# Patient Record
Sex: FEMALE | Race: BLACK OR AFRICAN AMERICAN | NOT HISPANIC OR LATINO | Employment: FULL TIME | URBAN - METROPOLITAN AREA
[De-identification: names, ages, dates, MRNs, and addresses within clinical notes are randomized per-mention and may not be internally consistent; named-entity substitution may affect disease eponyms.]

---

## 2023-04-13 LAB — GROUP A STREP, PCR: NOT DETECTED

## 2023-04-14 ENCOUNTER — TELEPHONE (OUTPATIENT)
Dept: PRIMARY CARE | Facility: CLINIC | Age: 28
End: 2023-04-14

## 2023-04-24 ENCOUNTER — LAB (OUTPATIENT)
Dept: LAB | Facility: LAB | Age: 28
End: 2023-04-24
Payer: COMMERCIAL

## 2023-04-24 LAB
ACTIVATED PARTIAL THROMBOPLASTIN TIME IN PPP BY COAGULATION ASSAY: 146 SEC (ref 26–39)
ALANINE AMINOTRANSFERASE (SGPT) (U/L) IN SER/PLAS: 16 U/L (ref 7–45)
ALBUMIN (G/DL) IN SER/PLAS: 3.7 G/DL (ref 3.4–5)
ALKALINE PHOSPHATASE (U/L) IN SER/PLAS: 58 U/L (ref 33–110)
AMPHETAMINE (PRESENCE) IN URINE BY SCREEN METHOD: NORMAL
ANION GAP IN SER/PLAS: 10 MMOL/L (ref 10–20)
ASPARTATE AMINOTRANSFERASE (SGOT) (U/L) IN SER/PLAS: 12 U/L (ref 9–39)
BARBITURATES PRESENCE IN URINE BY SCREEN METHOD: NORMAL
BASOPHILS (10*3/UL) IN BLOOD BY AUTOMATED COUNT: 0.06 X10E9/L (ref 0–0.1)
BASOPHILS/100 LEUKOCYTES IN BLOOD BY AUTOMATED COUNT: 0.8 % (ref 0–2)
BENZODIAZEPINE (PRESENCE) IN URINE BY SCREEN METHOD: NORMAL
BILIRUBIN TOTAL (MG/DL) IN SER/PLAS: 0.3 MG/DL (ref 0–1.2)
CALCIDIOL (25 OH VITAMIN D3) (NG/ML) IN SER/PLAS: 26 NG/ML
CALCIUM (MG/DL) IN SER/PLAS: 9 MG/DL (ref 8.6–10.3)
CANNABINOIDS IN URINE BY SCREEN METHOD: NORMAL
CARBON DIOXIDE, TOTAL (MMOL/L) IN SER/PLAS: 27 MMOL/L (ref 21–32)
CHLORIDE (MMOL/L) IN SER/PLAS: 104 MMOL/L (ref 98–107)
CHOLESTEROL (MG/DL) IN SER/PLAS: 173 MG/DL (ref 0–199)
CHOLESTEROL IN HDL (MG/DL) IN SER/PLAS: 41.9 MG/DL
CHOLESTEROL/HDL RATIO: 4.1
COBALAMIN (VITAMIN B12) (PG/ML) IN SER/PLAS: 394 PG/ML (ref 211–911)
COCAINE (PRESENCE) IN URINE BY SCREEN METHOD: NORMAL
CREATININE (MG/DL) IN SER/PLAS: 0.69 MG/DL (ref 0.5–1.05)
DRUG SCREEN COMMENT URINE: NORMAL
EOSINOPHILS (10*3/UL) IN BLOOD BY AUTOMATED COUNT: 0.2 X10E9/L (ref 0–0.7)
EOSINOPHILS/100 LEUKOCYTES IN BLOOD BY AUTOMATED COUNT: 2.7 % (ref 0–6)
ERYTHROCYTE DISTRIBUTION WIDTH (RATIO) BY AUTOMATED COUNT: 15.2 % (ref 11.5–14.5)
ERYTHROCYTE MEAN CORPUSCULAR HEMOGLOBIN CONCENTRATION (G/DL) BY AUTOMATED: 31.6 G/DL (ref 32–36)
ERYTHROCYTE MEAN CORPUSCULAR VOLUME (FL) BY AUTOMATED COUNT: 78 FL (ref 80–100)
ERYTHROCYTES (10*6/UL) IN BLOOD BY AUTOMATED COUNT: 4.89 X10E12/L (ref 4–5.2)
FENTANYL URINE: NORMAL
FERRITIN (UG/LL) IN SER/PLAS: 182 UG/L (ref 8–150)
FOLATE (NG/ML) IN SER/PLAS: 12.3 NG/ML
GFR FEMALE: >90 ML/MIN/1.73M2
GLUCOSE (MG/DL) IN SER/PLAS: 91 MG/DL (ref 74–99)
HEMATOCRIT (%) IN BLOOD BY AUTOMATED COUNT: 38 % (ref 36–46)
HEMOGLOBIN (G/DL) IN BLOOD: 12 G/DL (ref 12–16)
IMMATURE GRANULOCYTES/100 LEUKOCYTES IN BLOOD BY AUTOMATED COUNT: 0.4 % (ref 0–0.9)
INR IN PPP BY COAGULATION ASSAY: 1 (ref 0.9–1.1)
IRON (UG/DL) IN SER/PLAS: 93 UG/DL (ref 35–150)
IRON BINDING CAPACITY (UG/DL) IN SER/PLAS: 332 UG/DL (ref 240–445)
IRON SATURATION (%) IN SER/PLAS: 28 % (ref 25–45)
LDL: 109 MG/DL (ref 0–99)
LEUKOCYTES (10*3/UL) IN BLOOD BY AUTOMATED COUNT: 7.5 X10E9/L (ref 4.4–11.3)
LYMPHOCYTES (10*3/UL) IN BLOOD BY AUTOMATED COUNT: 3.52 X10E9/L (ref 1.2–4.8)
LYMPHOCYTES/100 LEUKOCYTES IN BLOOD BY AUTOMATED COUNT: 46.7 % (ref 13–44)
METHADONE (PRESENCE) IN URINE BY SCREEN METHOD: NORMAL
MONOCYTES (10*3/UL) IN BLOOD BY AUTOMATED COUNT: 0.78 X10E9/L (ref 0.1–1)
MONOCYTES/100 LEUKOCYTES IN BLOOD BY AUTOMATED COUNT: 10.4 % (ref 2–10)
NEUTROPHILS (10*3/UL) IN BLOOD BY AUTOMATED COUNT: 2.94 X10E9/L (ref 1.2–7.7)
NEUTROPHILS/100 LEUKOCYTES IN BLOOD BY AUTOMATED COUNT: 39 % (ref 40–80)
NRBC (PER 100 WBCS) BY AUTOMATED COUNT: 0 /100 WBC (ref 0–0)
OPIATES (PRESENCE) IN URINE BY SCREEN METHOD: NORMAL
OXYCODONE (PRESENCE) IN URINE BY SCREEN METHOD: NORMAL
PARATHYRIN INTACT (PG/ML) IN SER/PLAS: 36.8 PG/ML (ref 18.5–88)
PHENCYCLIDINE (PRESENCE) IN URINE BY SCREEN METHOD: NORMAL
PLATELETS (10*3/UL) IN BLOOD AUTOMATED COUNT: 239 X10E9/L (ref 150–450)
POTASSIUM (MMOL/L) IN SER/PLAS: 4.1 MMOL/L (ref 3.5–5.3)
PROTEIN TOTAL: 7.1 G/DL (ref 6.4–8.2)
PROTHROMBIN TIME (PT) IN PPP BY COAGULATION ASSAY: 12 SEC (ref 9.8–13.4)
SODIUM (MMOL/L) IN SER/PLAS: 137 MMOL/L (ref 136–145)
THYROTROPIN (MIU/L) IN SER/PLAS BY DETECTION LIMIT <= 0.05 MIU/L: 1.66 MIU/L (ref 0.44–3.98)
TOBACCO SCREEN, URINE: NEGATIVE
TRIGLYCERIDE (MG/DL) IN SER/PLAS: 111 MG/DL (ref 0–149)
UREA NITROGEN (MG/DL) IN SER/PLAS: 8 MG/DL (ref 6–23)
VLDL: 22 MG/DL (ref 0–40)

## 2023-04-25 LAB
C PEPTIDE (NG/ML) IN SER/PLAS: 2.4 NG/ML (ref 0.7–3.9)
ESTIMATED AVERAGE GLUCOSE FOR HBA1C: 123 MG/DL
H. PYLORI UBIT: NEGATIVE
HEMOGLOBIN A1C/HEMOGLOBIN TOTAL IN BLOOD: 5.9 %

## 2023-04-26 LAB
COPPER: 135.5 UG/DL (ref 80–155)
ZINC,SERUM OR PLASMA: 63.3 UG/DL (ref 60–120)

## 2023-04-27 ENCOUNTER — OFFICE VISIT (OUTPATIENT)
Dept: PRIMARY CARE | Facility: CLINIC | Age: 28
End: 2023-04-27
Payer: COMMERCIAL

## 2023-04-27 VITALS
HEIGHT: 67 IN | DIASTOLIC BLOOD PRESSURE: 80 MMHG | BODY MASS INDEX: 41.94 KG/M2 | OXYGEN SATURATION: 95 % | SYSTOLIC BLOOD PRESSURE: 110 MMHG | HEART RATE: 85 BPM | WEIGHT: 267.2 LBS | TEMPERATURE: 97.4 F | RESPIRATION RATE: 15 BRPM

## 2023-04-27 DIAGNOSIS — E28.2 PCOS (POLYCYSTIC OVARIAN SYNDROME): ICD-10-CM

## 2023-04-27 DIAGNOSIS — E78.5 HYPERLIPIDEMIA, UNSPECIFIED HYPERLIPIDEMIA TYPE: ICD-10-CM

## 2023-04-27 DIAGNOSIS — H66.90 ACUTE OTITIS MEDIA, UNSPECIFIED OTITIS MEDIA TYPE: ICD-10-CM

## 2023-04-27 DIAGNOSIS — E66.01 CLASS 3 SEVERE OBESITY DUE TO EXCESS CALORIES WITH SERIOUS COMORBIDITY AND BODY MASS INDEX (BMI) OF 40.0 TO 44.9 IN ADULT (MULTI): Primary | ICD-10-CM

## 2023-04-27 DIAGNOSIS — H69.93 DISORDER OF BOTH EUSTACHIAN TUBES: ICD-10-CM

## 2023-04-27 DIAGNOSIS — D68.9 COAGULOPATHY (MULTI): ICD-10-CM

## 2023-04-27 DIAGNOSIS — R73.03 PREDIABETES: ICD-10-CM

## 2023-04-27 DIAGNOSIS — B37.31 VAGINAL YEAST INFECTION: ICD-10-CM

## 2023-04-27 PROBLEM — R41.840 ATTENTION DEFICIT: Status: ACTIVE | Noted: 2023-04-27

## 2023-04-27 PROBLEM — K21.9 HIATAL HERNIA WITH GASTROESOPHAGEAL REFLUX: Status: ACTIVE | Noted: 2023-04-27

## 2023-04-27 PROBLEM — K44.9 HIATAL HERNIA WITH GASTROESOPHAGEAL REFLUX: Status: ACTIVE | Noted: 2023-04-27

## 2023-04-27 PROBLEM — F41.9 ANXIETY AND DEPRESSION: Status: ACTIVE | Noted: 2023-04-27

## 2023-04-27 PROBLEM — Z98.84 S/P LAPAROSCOPIC SLEEVE GASTRECTOMY: Status: ACTIVE | Noted: 2022-08-29

## 2023-04-27 PROBLEM — E66.9 OBESITY: Status: ACTIVE | Noted: 2021-12-14

## 2023-04-27 PROBLEM — E66.9 OBESITY: Status: RESOLVED | Noted: 2021-12-14 | Resolved: 2023-04-27

## 2023-04-27 PROBLEM — Z98.84 HISTORY OF BARIATRIC SURGERY: Status: ACTIVE | Noted: 2023-04-27

## 2023-04-27 PROBLEM — K21.9 GASTROESOPHAGEAL REFLUX DISEASE: Status: ACTIVE | Noted: 2022-08-29

## 2023-04-27 PROBLEM — F39 MOOD DISORDER (CMS-HCC): Status: ACTIVE | Noted: 2021-12-03

## 2023-04-27 PROBLEM — F33.1 MODERATE EPISODE OF RECURRENT MAJOR DEPRESSIVE DISORDER (MULTI): Status: ACTIVE | Noted: 2023-04-27

## 2023-04-27 PROBLEM — F32.A ANXIETY AND DEPRESSION: Status: ACTIVE | Noted: 2023-04-27

## 2023-04-27 PROBLEM — G43.009 MIGRAINE WITHOUT AURA AND RESPONSIVE TO TREATMENT: Status: ACTIVE | Noted: 2021-12-03

## 2023-04-27 PROBLEM — Z97.5 IUD CONTRACEPTION: Status: ACTIVE | Noted: 2023-04-27

## 2023-04-27 PROBLEM — E04.9 GOITER: Status: ACTIVE | Noted: 2021-12-03

## 2023-04-27 PROBLEM — L70.9 ACNE: Status: ACTIVE | Noted: 2021-12-03

## 2023-04-27 PROBLEM — T78.40XA ALLERGIES: Status: ACTIVE | Noted: 2023-04-27

## 2023-04-27 PROBLEM — E55.9 VITAMIN D DEFICIENCY: Status: ACTIVE | Noted: 2023-04-27

## 2023-04-27 LAB
VITAMIN A (RETINOL): 0.42 MG/L (ref 0.3–1.2)
VITAMIN A (RETINYL PALMITATE): <0.02 MG/L (ref 0–0.1)
VITAMIN A, INTERPRETATION: NORMAL

## 2023-04-27 PROCEDURE — 1036F TOBACCO NON-USER: CPT | Performed by: FAMILY MEDICINE

## 2023-04-27 PROCEDURE — 99214 OFFICE O/P EST MOD 30 MIN: CPT | Performed by: FAMILY MEDICINE

## 2023-04-27 PROCEDURE — 3008F BODY MASS INDEX DOCD: CPT | Performed by: FAMILY MEDICINE

## 2023-04-27 RX ORDER — NORETHINDRONE ACETATE AND ETHINYL ESTRADIOL AND FERROUS FUMARATE 1.5-30(21)
1 KIT ORAL DAILY
COMMUNITY
Start: 2023-04-13 | End: 2023-04-27 | Stop reason: ALTCHOICE

## 2023-04-27 RX ORDER — AMOXICILLIN 875 MG/1
875 TABLET, FILM COATED ORAL 2 TIMES DAILY
Qty: 20 TABLET | Refills: 0 | Status: SHIPPED | OUTPATIENT
Start: 2023-04-27 | End: 2023-05-07

## 2023-04-27 RX ORDER — HYDROXYZINE HYDROCHLORIDE 25 MG/1
TABLET, FILM COATED ORAL
COMMUNITY
Start: 2021-04-15 | End: 2023-08-07

## 2023-04-27 RX ORDER — FLUTICASONE PROPIONATE 50 MCG
1 SPRAY, SUSPENSION (ML) NASAL DAILY
Qty: 16 G | Refills: 11 | Status: SHIPPED | OUTPATIENT
Start: 2023-04-27 | End: 2023-10-27 | Stop reason: ALTCHOICE

## 2023-04-27 RX ORDER — LEVONORGESTREL 52 MG/1
INTRAUTERINE DEVICE INTRAUTERINE
COMMUNITY
Start: 2020-06-17

## 2023-04-27 RX ORDER — PHENOL 1.4 %
AEROSOL, SPRAY (ML) MUCOUS MEMBRANE
COMMUNITY

## 2023-04-27 RX ORDER — GLUC/MSM/COLGN2/HYAL/ANTIARTH3 375-375-20
TABLET ORAL
COMMUNITY

## 2023-04-27 RX ORDER — FLUCONAZOLE 150 MG/1
150 TABLET ORAL ONCE
Qty: 2 TABLET | Refills: 0 | Status: SHIPPED | OUTPATIENT
Start: 2023-04-27 | End: 2023-04-27

## 2023-04-27 RX ORDER — TALC
POWDER (GRAM) TOPICAL
COMMUNITY
End: 2023-12-01 | Stop reason: WASHOUT

## 2023-04-27 RX ORDER — PSYLLIUM HUSK 0.4 G
CAPSULE ORAL
COMMUNITY

## 2023-04-27 ASSESSMENT — ANXIETY QUESTIONNAIRES
5. BEING SO RESTLESS THAT IT IS HARD TO SIT STILL: NOT AT ALL
7. FEELING AFRAID AS IF SOMETHING AWFUL MIGHT HAPPEN: NOT AT ALL
3. WORRYING TOO MUCH ABOUT DIFFERENT THINGS: NOT AT ALL
GAD7 TOTAL SCORE: 2
1. FEELING NERVOUS, ANXIOUS, OR ON EDGE: SEVERAL DAYS
2. NOT BEING ABLE TO STOP OR CONTROL WORRYING: NOT AT ALL
IF YOU CHECKED OFF ANY PROBLEMS ON THIS QUESTIONNAIRE, HOW DIFFICULT HAVE THESE PROBLEMS MADE IT FOR YOU TO DO YOUR WORK, TAKE CARE OF THINGS AT HOME, OR GET ALONG WITH OTHER PEOPLE: NOT DIFFICULT AT ALL
6. BECOMING EASILY ANNOYED OR IRRITABLE: SEVERAL DAYS
4. TROUBLE RELAXING: NOT AT ALL

## 2023-04-27 ASSESSMENT — PATIENT HEALTH QUESTIONNAIRE - PHQ9
SUM OF ALL RESPONSES TO PHQ9 QUESTIONS 1 AND 2: 0
2. FEELING DOWN, DEPRESSED OR HOPELESS: NOT AT ALL
1. LITTLE INTEREST OR PLEASURE IN DOING THINGS: NOT AT ALL
8. MOVING OR SPEAKING SO SLOWLY THAT OTHER PEOPLE COULD HAVE NOTICED. OR THE OPPOSITE, BEING SO FIGETY OR RESTLESS THAT YOU HAVE BEEN MOVING AROUND A LOT MORE THAN USUAL: NOT AT ALL
5. POOR APPETITE OR OVEREATING: SEVERAL DAYS
7. TROUBLE CONCENTRATING ON THINGS, SUCH AS READING THE NEWSPAPER OR WATCHING TELEVISION: SEVERAL DAYS
3. TROUBLE FALLING OR STAYING ASLEEP OR SLEEPING TOO MUCH: SEVERAL DAYS
10. IF YOU CHECKED OFF ANY PROBLEMS, HOW DIFFICULT HAVE THESE PROBLEMS MADE IT FOR YOU TO DO YOUR WORK, TAKE CARE OF THINGS AT HOME, OR GET ALONG WITH OTHER PEOPLE: NOT DIFFICULT AT ALL
9. THOUGHTS THAT YOU WOULD BE BETTER OFF DEAD, OR OF HURTING YOURSELF: NOT AT ALL
6. FEELING BAD ABOUT YOURSELF - OR THAT YOU ARE A FAILURE OR HAVE LET YOURSELF OR YOUR FAMILY DOWN: NOT AT ALL
4. FEELING TIRED OR HAVING LITTLE ENERGY: SEVERAL DAYS
SUM OF ALL RESPONSES TO PHQ QUESTIONS 1-9: 4

## 2023-04-27 ASSESSMENT — LIFESTYLE VARIABLES: HOW MANY STANDARD DRINKS CONTAINING ALCOHOL DO YOU HAVE ON A TYPICAL DAY: PATIENT DOES NOT DRINK

## 2023-04-27 NOTE — PATIENT INSTRUCTIONS
Assessment/Plan   Problem List Items Addressed This Visit          Endocrine/Metabolic    PCOS (polycystic ovarian syndrome)  - continue with wt loss  - declined metformin for now       Hematologic    Coagulopathy (CMS/HCC)     Elevated PTT  Referral for hem/onc          Other Visit Diagnoses       Class 3 severe obesity due to excess calories with serious comorbidity and body mass index (BMI) of 40.0 to 44.9 in adult (CMS/[facility])    -  Primary    Hyperlipidemia, unspecified hyperlipidemia type        Prediabetes        Vaginal yeast infection        Relevant Medications    fluconazole (Diflucan) 150 mg tablet    Disorder of both eustachian tubes        Relevant Medications    fluticasone (Flonase) 50 mcg/actuation nasal spray    amoxicillin (Amoxil) 875 mg tablet    fluconazole (Diflucan) 150 mg tablet    Acute otitis media, unspecified otitis media type        Relevant Medications    fluticasone (Flonase) 50 mcg/actuation nasal spray    amoxicillin (Amoxil) 875 mg tablet    fluconazole (Diflucan) 150 mg tablet            Please follow up in 6months for HTN or as needed.       ** If labs or imaging ordered at today's visit, all the non-urgent results will be discussed at your next visit    If you have been referred for a special test or to a specialist please call  [phone] to schedule an appointment.  If you have any further questions, or if develop new or worsened symptoms, please give our office a call at [phone].

## 2023-04-27 NOTE — PROGRESS NOTES
"Subjective   Patient ID: Cecelia Phillips is a 28 y.o. female who presents for Pre-op Exam (Pt is here for medical clearance for bariatric surgery.).    HPI     Getting  revision bypass  Was with gastric shelve 2021 Dec- now with a hiatal causing N/V after PO  Lost 90,then gain some back  Going to get bypass      Ear pain on the right  Was with strep 1-2 wks ago- was given abx- completed- was given z pac      Work- isL&D and main-and RN  Kid- none  not dating  - likes/ hobbies-shopping/ watching tv      Review of Systems  All systems reviewed and neg if not noted in the HPI above   Objective   /80 (Patient Position: Sitting)   Pulse 85   Temp 36.3 °C (97.4 °F)   Resp 15   Ht 1.689 m (5' 6.5\")   Wt 121 kg (267 lb 3.2 oz)   SpO2 95%   BMI 42.48 kg/m²     Physical Exam    Pleasant  Eyes: conjunctiva non-icteric and eye lids are without obvious rash or drooping. Pupils are symmetric.   Ears, Nose, Mouth, and Throat: External ears and nose appear to be without deformity or rash. No lesions or masses noted. Hearing is grossly intact.   Air fluid levels in the right ear with some erythrma  Left ear TM erythema   CV: RRR, no murmur  Carotids: no bruits  Pulm:CTA B/L  Abd: soft, NTTP, + BS  LE: no edema  Psychiatric: Alert, orientation to person, place, and time. Recent/remote memory as evidenced through face-to-face interaction and discussion appear grossly intact. Mood and affect are normal.        Assessment/Plan   Problem List Items Addressed This Visit          Endocrine/Metabolic    PCOS (polycystic ovarian syndrome)  - continue with wt loss  - declined metformin for now       Hematologic    Coagulopathy (CMS/HCC)     Elevated PTT  Referral for hem/onc          Other Visit Diagnoses       Class 3 severe obesity due to excess calories with serious comorbidity and body mass index (BMI) of 40.0 to 44.9 in adult (CMS/HCC)    -  Primary    Hyperlipidemia, unspecified hyperlipidemia type        Prediabetes        " Vaginal yeast infection        Relevant Medications    fluconazole (Diflucan) 150 mg tablet    Disorder of both eustachian tubes        Relevant Medications    fluticasone (Flonase) 50 mcg/actuation nasal spray    amoxicillin (Amoxil) 875 mg tablet    fluconazole (Diflucan) 150 mg tablet    Acute otitis media, unspecified otitis media type        Relevant Medications    fluticasone (Flonase) 50 mcg/actuation nasal spray    amoxicillin (Amoxil) 875 mg tablet    fluconazole (Diflucan) 150 mg tablet            Please follow up in 6months for HTN or as needed.       ** If labs or imaging ordered at today's visit, all the non-urgent results will be discussed at your next visit    If you have been referred for a special test or to a specialist please call  [phone] to schedule an appointment.  If you have any further questions, or if develop new or worsened symptoms, please give our office a call at [phone].

## 2023-04-29 LAB
COTININE BLOOD QUANTITATIVE: <5 NG/ML
NICOTINE BLOOD QUANTITATIVE: <5 NG/ML
VITAMIN B1, WHOLE BLOOD: 98 NMOL/L (ref 70–180)

## 2023-07-26 DIAGNOSIS — F32.A DEPRESSION, UNSPECIFIED: ICD-10-CM

## 2023-07-26 DIAGNOSIS — F41.9 ANXIETY DISORDER, UNSPECIFIED: ICD-10-CM

## 2023-08-07 ENCOUNTER — HOSPITAL ENCOUNTER (OUTPATIENT)
Dept: DATA CONVERSION | Facility: HOSPITAL | Age: 28
End: 2023-08-07
Attending: SURGERY | Admitting: SURGERY
Payer: COMMERCIAL

## 2023-08-07 DIAGNOSIS — R10.13 EPIGASTRIC PAIN: ICD-10-CM

## 2023-08-07 DIAGNOSIS — R12 HEARTBURN: ICD-10-CM

## 2023-08-07 DIAGNOSIS — Z98.84 BARIATRIC SURGERY STATUS: ICD-10-CM

## 2023-08-07 DIAGNOSIS — K21.9 GASTRO-ESOPHAGEAL REFLUX DISEASE WITHOUT ESOPHAGITIS: ICD-10-CM

## 2023-08-07 DIAGNOSIS — K95.89 OTHER COMPLICATIONS OF OTHER BARIATRIC PROCEDURE: ICD-10-CM

## 2023-08-07 DIAGNOSIS — R11.2 NAUSEA WITH VOMITING, UNSPECIFIED: ICD-10-CM

## 2023-08-07 RX ORDER — HYDROXYZINE HYDROCHLORIDE 25 MG/1
25 TABLET, FILM COATED ORAL NIGHTLY
Qty: 90 TABLET | Refills: 1 | Status: SHIPPED | OUTPATIENT
Start: 2023-08-07 | End: 2023-10-27 | Stop reason: SDUPTHER

## 2023-09-05 LAB
ALANINE AMINOTRANSFERASE (SGPT) (U/L) IN SER/PLAS: 10 U/L (ref 7–45)
ALBUMIN (G/DL) IN SER/PLAS: 3.7 G/DL (ref 3.4–5)
ALKALINE PHOSPHATASE (U/L) IN SER/PLAS: 70 U/L (ref 33–110)
ANION GAP IN SER/PLAS: 9 MMOL/L (ref 10–20)
ASPARTATE AMINOTRANSFERASE (SGOT) (U/L) IN SER/PLAS: 12 U/L (ref 9–39)
BASOPHILS (10*3/UL) IN BLOOD BY AUTOMATED COUNT: 0.04 X10E9/L (ref 0–0.1)
BASOPHILS/100 LEUKOCYTES IN BLOOD BY AUTOMATED COUNT: 0.8 % (ref 0–2)
BILIRUBIN TOTAL (MG/DL) IN SER/PLAS: 0.4 MG/DL (ref 0–1.2)
CALCIDIOL (25 OH VITAMIN D3) (NG/ML) IN SER/PLAS: 29 NG/ML
CALCIUM (MG/DL) IN SER/PLAS: 8.7 MG/DL (ref 8.6–10.3)
CARBON DIOXIDE, TOTAL (MMOL/L) IN SER/PLAS: 30 MMOL/L (ref 21–32)
CHLORIDE (MMOL/L) IN SER/PLAS: 103 MMOL/L (ref 98–107)
COBALAMIN (VITAMIN B12) (PG/ML) IN SER/PLAS: 337 PG/ML (ref 211–911)
CREATININE (MG/DL) IN SER/PLAS: 0.64 MG/DL (ref 0.5–1.05)
EOSINOPHILS (10*3/UL) IN BLOOD BY AUTOMATED COUNT: 0.19 X10E9/L (ref 0–0.7)
EOSINOPHILS/100 LEUKOCYTES IN BLOOD BY AUTOMATED COUNT: 3.6 % (ref 0–6)
ERYTHROCYTE DISTRIBUTION WIDTH (RATIO) BY AUTOMATED COUNT: 15.6 % (ref 11.5–14.5)
ERYTHROCYTE MEAN CORPUSCULAR HEMOGLOBIN CONCENTRATION (G/DL) BY AUTOMATED: 31.4 G/DL (ref 32–36)
ERYTHROCYTE MEAN CORPUSCULAR VOLUME (FL) BY AUTOMATED COUNT: 77 FL (ref 80–100)
ERYTHROCYTES (10*6/UL) IN BLOOD BY AUTOMATED COUNT: 4.95 X10E12/L (ref 4–5.2)
ESTIMATED AVERAGE GLUCOSE FOR HBA1C: 111 MG/DL
FERRITIN (UG/LL) IN SER/PLAS: 171 UG/L (ref 8–150)
FOLATE (NG/ML) IN SER/PLAS: 12.8 NG/ML
GFR FEMALE: >90 ML/MIN/1.73M2
GLUCOSE (MG/DL) IN SER/PLAS: 85 MG/DL (ref 74–99)
HEMATOCRIT (%) IN BLOOD BY AUTOMATED COUNT: 37.9 % (ref 36–46)
HEMOGLOBIN (G/DL) IN BLOOD: 11.9 G/DL (ref 12–16)
HEMOGLOBIN A1C/HEMOGLOBIN TOTAL IN BLOOD: 5.5 %
IMMATURE GRANULOCYTES/100 LEUKOCYTES IN BLOOD BY AUTOMATED COUNT: 0 % (ref 0–0.9)
IRON (UG/DL) IN SER/PLAS: 78 UG/DL (ref 35–150)
IRON BINDING CAPACITY (UG/DL) IN SER/PLAS: 325 UG/DL (ref 240–445)
IRON SATURATION (%) IN SER/PLAS: 24 % (ref 25–45)
LEUKOCYTES (10*3/UL) IN BLOOD BY AUTOMATED COUNT: 5.3 X10E9/L (ref 4.4–11.3)
LYMPHOCYTES (10*3/UL) IN BLOOD BY AUTOMATED COUNT: 2.74 X10E9/L (ref 1.2–4.8)
LYMPHOCYTES/100 LEUKOCYTES IN BLOOD BY AUTOMATED COUNT: 52.1 % (ref 13–44)
MONOCYTES (10*3/UL) IN BLOOD BY AUTOMATED COUNT: 0.55 X10E9/L (ref 0.1–1)
MONOCYTES/100 LEUKOCYTES IN BLOOD BY AUTOMATED COUNT: 10.5 % (ref 2–10)
NEUTROPHILS (10*3/UL) IN BLOOD BY AUTOMATED COUNT: 1.74 X10E9/L (ref 1.2–7.7)
NEUTROPHILS/100 LEUKOCYTES IN BLOOD BY AUTOMATED COUNT: 33 % (ref 40–80)
PARATHYRIN INTACT (PG/ML) IN SER/PLAS: 24.3 PG/ML (ref 18.5–88)
PLATELETS (10*3/UL) IN BLOOD AUTOMATED COUNT: 159 X10E9/L (ref 150–450)
POTASSIUM (MMOL/L) IN SER/PLAS: 3.8 MMOL/L (ref 3.5–5.3)
PROTEIN TOTAL: 7.2 G/DL (ref 6.4–8.2)
SODIUM (MMOL/L) IN SER/PLAS: 138 MMOL/L (ref 136–145)
UREA NITROGEN (MG/DL) IN SER/PLAS: 7 MG/DL (ref 6–23)

## 2023-09-11 LAB — VITAMIN B1, WHOLE BLOOD: 94 NMOL/L (ref 70–180)

## 2023-09-30 NOTE — H&P
History of Present Illness:   Pregnant/Lactating:  ·  Are You Pregnant no   ·  Are You Currently Breastfeeding no     History Present Illness:  Reason for surgery: EGD for persistent reflux/n/v/poor  PO intake   HPI:    28F w/ h/o sleeve (2021) converted to bypass on 6/7/2023 here with persistent reflux and poor PO intake.     Past Medical History:        Surg History:   Gastroesophageal reflux disease: Onset Date: 13-Jun-2023    Allergies:        Allergies:  ·  No Known Allergies :     Home Medication Review:   Home Medications Reviewed: yes     Impression/Procedure:   ·  Impression and Planned Procedure: Young female sleeve converted to bypass here about 2 months post surgery complaining of persistent reflux and poor PO intake. Diagnostic EGD       ERAS (Enhanced Recovery After Surgery):  ·  ERAS Patient: no                   Physical Exam by System:    Constitutional: Well developed, awake/alert/oriented  x3, no distress, alert and cooperative   Respiratory/Thorax: Patent airways, CTAB, normal  breath sounds with good chest expansion, thorax symmetric   Cardiovascular: Regular, rate and rhythm, no murmurs,  2+ equal pulses of the extremities, normal S 1and S 2   Psychological: Appropriate mood and behavior   Skin: Warm and dry, no lesions, no rashes     Consent:   COVID-19 Consent:  ·  COVID-19 Risk Consent Surgeon has reviewed key risks related to the risk of fer COVID-19 and if they contract COVID-19 what the risks are.       Electronic Signatures:  Stella Gan)  (Signed 07-Aug-2023 09:58)   Authored: History of Present Illness, Past Medical History,  Allergies, Home Medication Review, Impression/Procedure, ERAS, Physical Exam, Consent, Note Completion      Last Updated: 07-Aug-2023 09:58 by Stella Gan)

## 2023-10-27 ENCOUNTER — OFFICE VISIT (OUTPATIENT)
Dept: PRIMARY CARE | Facility: CLINIC | Age: 28
End: 2023-10-27
Payer: COMMERCIAL

## 2023-10-27 VITALS
HEART RATE: 80 BPM | TEMPERATURE: 97.7 F | OXYGEN SATURATION: 97 % | DIASTOLIC BLOOD PRESSURE: 74 MMHG | BODY MASS INDEX: 36.37 KG/M2 | RESPIRATION RATE: 14 BRPM | SYSTOLIC BLOOD PRESSURE: 118 MMHG | WEIGHT: 226.3 LBS | HEIGHT: 66 IN

## 2023-10-27 DIAGNOSIS — F32.A DEPRESSION, UNSPECIFIED: ICD-10-CM

## 2023-10-27 DIAGNOSIS — F41.9 ANXIETY DISORDER, UNSPECIFIED: ICD-10-CM

## 2023-10-27 DIAGNOSIS — H93.8X3 IRRITATION OF EAR, BILATERAL: ICD-10-CM

## 2023-10-27 DIAGNOSIS — Z00.00 WELLNESS EXAMINATION: Primary | ICD-10-CM

## 2023-10-27 PROBLEM — R11.2 POST-OPERATIVE NAUSEA AND VOMITING: Status: ACTIVE | Noted: 2023-10-27

## 2023-10-27 PROBLEM — D50.9 IRON DEFICIENCY ANEMIA: Status: ACTIVE | Noted: 2023-10-27

## 2023-10-27 PROBLEM — G89.18 POST-OP PAIN: Status: ACTIVE | Noted: 2023-10-27

## 2023-10-27 PROBLEM — E86.0 DEHYDRATION: Status: ACTIVE | Noted: 2023-10-27

## 2023-10-27 PROBLEM — B37.31 CANDIDIASIS OF VAGINA: Status: ACTIVE | Noted: 2023-10-27

## 2023-10-27 PROBLEM — K91.2 POSTOPERATIVE MALABSORPTION (HHS-HCC): Status: ACTIVE | Noted: 2023-10-27

## 2023-10-27 PROBLEM — R11.0 NAUSEA IN ADULT: Status: ACTIVE | Noted: 2023-10-27

## 2023-10-27 PROBLEM — D68.9 COAGULOPATHY (MULTI): Status: RESOLVED | Noted: 2023-04-27 | Resolved: 2023-10-27

## 2023-10-27 PROBLEM — K59.00 CONSTIPATION, ACUTE: Status: ACTIVE | Noted: 2023-10-27

## 2023-10-27 PROBLEM — F54 PSYCHOLOGICAL FACTORS AFFECTING MEDICAL CONDITION: Status: ACTIVE | Noted: 2023-10-27

## 2023-10-27 PROBLEM — Z98.890 POST-OPERATIVE NAUSEA AND VOMITING: Status: ACTIVE | Noted: 2023-10-27

## 2023-10-27 PROBLEM — Z98.84 S/P GASTRIC BYPASS: Status: ACTIVE | Noted: 2023-10-27

## 2023-10-27 PROBLEM — F39 MOOD DISORDER (CMS-HCC): Status: RESOLVED | Noted: 2021-12-03 | Resolved: 2023-10-27

## 2023-10-27 PROCEDURE — 99395 PREV VISIT EST AGE 18-39: CPT | Performed by: FAMILY MEDICINE

## 2023-10-27 PROCEDURE — 1036F TOBACCO NON-USER: CPT | Performed by: FAMILY MEDICINE

## 2023-10-27 PROCEDURE — 3008F BODY MASS INDEX DOCD: CPT | Performed by: FAMILY MEDICINE

## 2023-10-27 PROCEDURE — 99212 OFFICE O/P EST SF 10 MIN: CPT | Performed by: FAMILY MEDICINE

## 2023-10-27 RX ORDER — THIAMINE HCL 100 MG
50 TABLET ORAL DAILY
COMMUNITY
Start: 2023-07-03

## 2023-10-27 RX ORDER — OMEPRAZOLE 40 MG/1
CAPSULE, DELAYED RELEASE ORAL
COMMUNITY
Start: 2023-05-26 | End: 2024-05-16 | Stop reason: SDUPTHER

## 2023-10-27 RX ORDER — ONDANSETRON 4 MG/1
TABLET, ORALLY DISINTEGRATING ORAL
COMMUNITY
Start: 2023-05-26 | End: 2023-11-06 | Stop reason: SDUPTHER

## 2023-10-27 RX ORDER — FLUOCINOLONE ACETONIDE 0.11 MG/ML
5 OIL AURICULAR (OTIC) 2 TIMES DAILY
Qty: 20 ML | Refills: 0 | Status: SHIPPED | OUTPATIENT
Start: 2023-10-27 | End: 2023-12-01 | Stop reason: WASHOUT

## 2023-10-27 RX ORDER — HYDROXYZINE HYDROCHLORIDE 25 MG/1
25 TABLET, FILM COATED ORAL NIGHTLY
Qty: 90 TABLET | Refills: 1 | Status: SHIPPED | OUTPATIENT
Start: 2023-10-27 | End: 2023-12-18 | Stop reason: SDUPTHER

## 2023-10-27 ASSESSMENT — PROMIS GLOBAL HEALTH SCALE
RATE_AVERAGE_PAIN: 0
EMOTIONAL_PROBLEMS: RARELY
CARRYOUT_SOCIAL_ACTIVITIES: EXCELLENT
RATE_GENERAL_HEALTH: VERY GOOD
CARRYOUT_PHYSICAL_ACTIVITIES: COMPLETELY
RATE_PHYSICAL_HEALTH: VERY GOOD
RATE_QUALITY_OF_LIFE: VERY GOOD
RATE_MENTAL_HEALTH: GOOD
RATE_AVERAGE_FATIGUE: MILD
RATE_SOCIAL_SATISFACTION: VERY GOOD

## 2023-10-27 ASSESSMENT — PATIENT HEALTH QUESTIONNAIRE - PHQ9
SUM OF ALL RESPONSES TO PHQ9 QUESTIONS 1 AND 2: 0
1. LITTLE INTEREST OR PLEASURE IN DOING THINGS: NOT AT ALL
2. FEELING DOWN, DEPRESSED OR HOPELESS: NOT AT ALL

## 2023-10-27 NOTE — PATIENT INSTRUCTIONS
*CONGRATULATIONS ON YOUR   60 LB WEIGHT LOSS!! *  - KEEP UP THE GOOD WORK  - Continue to work on healthy diet and exercise     We encourage all patients to engage in exercise 150 minutes per week   Adults 18 and older, activity during each week - if you are able:    Engage in at least 150 minutes of moderate or vigorous exercise per week   If you are able- Engage in muscle strengthening activity on 2 or more days per week        Please follow up in 1 yr for wellness  or as needed.       ** If labs or imaging ordered at today's visit, all the non-urgent results will be discussed at your next visit    If you have been referred for a special test or to a specialist please call  [phone] to schedule an appointment.  If you have any further questions, or if develop new or worsened symptoms, please give our office a call at [phone].

## 2023-10-27 NOTE — ADDENDUM NOTE
Addended by: MINGO BRENNER on: 10/27/2023 09:01 AM     Modules accepted: Orders, Level of Service

## 2023-10-27 NOTE — PROGRESS NOTES
"Subjective   Patient ID: Cecelia Phillips is a 28 y.o. female who presents for Annual Exam.    HPI   Here for a physical  Does not want a chaperone.     Other concerns/issues/followup:  1 - was with revision bypass- getting some V/N  Was with gastric shelve 2021 Dec- now with a hiatal causing N/V after PO  Lost 90,then gain some back  Lost 60lb since revision     2. Has been with ear irritation and itching    [facility] was reviewed & updated.     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 Pupils are symmetric.   Ears, Nose, Mouth, and Throat: External ears and nose appear to be without deformity or rash. No lesions or masses noted. Hearing is grossly intact.   CV: RRR, no murmur  Carotids: no bruits  Ear with erythema, irritation, no swelling or TM erythema, mild clear fluid in the right TM  Thyroid nml  Pulm:CTA B/L  Abd: soft, NTTP, + BS  LE: no edema  Psychiatric: Alert, orientation to person, place, and time. Recent/remote memory as evidenced through face-to-face interaction and discussion appear grossly intact. Mood and affect are normal.      Assessment/Plan   Problem List Items Addressed This Visit    None  Visit Diagnoses         Codes    Wellness examination    -  Primary Z00.00    Relevant Orders    Lipid Panel    Anxiety disorder, unspecified     F41.9    Relevant Medications    hydrOXYzine HCL (Atarax) 25 mg tablet    Depression, unspecified     F32.A    Relevant Medications    hydrOXYzine HCL (Atarax) 25 mg tablet    Irritation " of ear, bilateral     H93.8X3    Relevant Medications    fluocinolone (DermOtic) 0.01 % ear drops          Please follow up in 1 yr for wellness  or as needed.

## 2023-11-04 RX ORDER — ERGOCALCIFEROL 1.25 MG/1
CAPSULE ORAL
COMMUNITY

## 2023-11-04 RX ORDER — PROMETHAZINE HYDROCHLORIDE 12.5 MG/1
12.5 TABLET ORAL EVERY 6 HOURS PRN
COMMUNITY

## 2023-11-04 RX ORDER — ESOMEPRAZOLE MAGNESIUM 40 MG/1
CAPSULE, DELAYED RELEASE ORAL
COMMUNITY
End: 2023-11-06 | Stop reason: SDUPTHER

## 2023-11-06 ENCOUNTER — TELEMEDICINE (OUTPATIENT)
Dept: SURGERY | Facility: CLINIC | Age: 28
End: 2023-11-06
Payer: COMMERCIAL

## 2023-11-06 DIAGNOSIS — Z98.84 S/P GASTRIC BYPASS: Primary | ICD-10-CM

## 2023-11-06 DIAGNOSIS — R13.10 DYSPHAGIA, UNSPECIFIED TYPE: ICD-10-CM

## 2023-11-06 DIAGNOSIS — R11.0 NAUSEA IN ADULT: ICD-10-CM

## 2023-11-06 DIAGNOSIS — K91.2 POSTOPERATIVE MALABSORPTION (HHS-HCC): ICD-10-CM

## 2023-11-06 PROCEDURE — 99215 OFFICE O/P EST HI 40 MIN: CPT | Mod: 95 | Performed by: NURSE PRACTITIONER

## 2023-11-06 PROCEDURE — 99215 OFFICE O/P EST HI 40 MIN: CPT | Performed by: NURSE PRACTITIONER

## 2023-11-06 RX ORDER — ESOMEPRAZOLE MAGNESIUM 40 MG/1
40 GRANULE, DELAYED RELEASE ORAL
Qty: 60 PACKET | Refills: 3 | Status: SHIPPED | OUTPATIENT
Start: 2023-11-06 | End: 2024-05-09 | Stop reason: HOSPADM

## 2023-11-06 RX ORDER — ONDANSETRON 4 MG/1
4-8 TABLET, ORALLY DISINTEGRATING ORAL EVERY 8 HOURS PRN
Qty: 30 TABLET | Refills: 3 | Status: SHIPPED | OUTPATIENT
Start: 2023-11-06

## 2023-11-06 RX ORDER — SUCRALFATE 1 G/1
1 TABLET ORAL
Qty: 120 TABLET | Refills: 3 | Status: SHIPPED | OUTPATIENT
Start: 2023-11-06 | End: 2023-12-01 | Stop reason: WASHOUT

## 2023-11-06 NOTE — PROGRESS NOTES
BARIATRIC SURGERY CLINIC  FOLLOW UP NOTE      Name: Cecelia Phillips  MRN: [id number]    Index Surgery  Date of Surgery: 6/7/23    Surgeon: Malik    Surgical Procedure: Revision: LSG to Bypass 91318    HPI:   Presenting for follow up visit.  Had revision LSG to Bypass.  Had EGD with dilation GJ anastamosis with Dr. Gan in August 2023.  At 3 m POV in September c/o nausea from omeprazole so switched to Nexium.  Recently messaged RN coordinator stating having increased issues with early satiety & emesis after meals, feeling of food getting stuck.    Still taking BID PPI, initially helpful but in last 2 weeks has been struggling with increased post regular diet meal emesis and early satiety.    Diet Had to revert back to soft foods diet due to poor diet tolerance, emesis after solid/regular diet.  Meeting protein and fluid requirements with assistance of shakes.  Able to do smoothies and pureed.  Sometimes pureed/soft foods trigger nausea & vomiting.   Feels similar to prior to her dilation.     Concerns related to:  Nausea/Vomiting, Reflux: + see above  Abdominal Pain: Had 1 episode of RUQ after eating yesterday, resolved spontaneously.    Diarrhea/Constipation Has struggled with constipation historically    DAILY SUPPLEMENTS:  Calcium: Calcium Citrate w/ vitamin D (1200 - 1500mg)  Multivitamin & Minerals: 2 per day  Vitamin B12: 500 mcg/day   Vitamin D3: 3000 units  Iron/Other: iron daily  PPI:Nexium once daily      Current Outpatient Medications   Medication Sig Dispense Refill    calcium carbonate-vitamin D3 1,000 mg-20 mcg (800 unit) tablet Take by mouth.      ergocalciferol (Vitamin D-2) 1.25 MG (50587 UT) capsule Take 1 capsule twice weekly x 8 weeks then start Vitamin D3 2000 units daily over the counter      esomeprazole (NexIUM Packet) 40 mg packet Take 40 mg by mouth once daily in the morning. Take before meals. Mix packet in 10mL water and take twice daily 60 packet 3    ferrous gluconate 236 mg (27 mg iron)  tablet Take by mouth.      fluocinolone (DermOtic) 0.01 % ear drops Administer 5 drops into each ear 2 times a day. 20 mL 0    hydrOXYzine HCL (Atarax) 25 mg tablet Take 1 tablet (25 mg) by mouth once daily at bedtime. 90 tablet 1    levonorgestrel (Mirena) 21 mcg/24 hours (8 yrs) 52 mg IUD by intrauterine route.      melatonin 3 mg tablet Take by mouth.      multivitamin-min-iron-FA-vit K (Adults Multivitamin) 18 mg iron-400 mcg-25 mcg tablet Take by mouth.      omeprazole (PriLOSEC) 40 mg DR capsule Take by mouth once daily.      ondansetron ODT (Zofran-ODT) 4 mg disintegrating tablet Take 1-2 tablets (4-8 mg) by mouth every 8 hours if needed for nausea or vomiting. 30 tablet 3    promethazine (Phenergan) 12.5 mg tablet Take 1 tablet (12.5 mg) by mouth every 6 hours if needed for nausea.      sucralfate (Carafate) 1 gram tablet Take 1 tablet (1 g) by mouth 4 times a day before meals. Crush tablet and mix in 10mL water 120 tablet 3    Vitamin B-1 100 mg tablet Take 0.5 tablets (50 mg) by mouth once daily.       No current facility-administered medications for this visit.       Comorbidities:  Patient Active Problem List   Diagnosis    Acne    Allergies    Anxiety and depression    Attention deficit    Goiter    Gastroesophageal reflux disease    Hiatal hernia with gastroesophageal reflux    History of bariatric surgery    IUD contraception    Migraine without aura and responsive to treatment    Moderate episode of recurrent major depressive disorder (CMS/HCC)    PCOS (polycystic ovarian syndrome)    Vitamin D deficiency    Candidiasis of vagina    Constipation, acute    Dehydration    Iron deficiency anemia    Nausea in adult    Post-op pain    Post-operative nausea and vomiting    Postoperative malabsorption    Psychological factors affecting medical condition    S/P gastric bypass    Dysphagia         REVIEW OF SYSTEMS:  CONSTITUTIONAL: Patient denies fevers, chills, sweats and weight changes.  CARDIOVASCULAR:  Patient denies chest pains, palpitations, orthopnea and paroxysmal nocturnal dyspnea.  RESPIRATORY: No dyspnea on exertion, no wheezing or cough.  GI: No nausea, vomiting, diarrhea, constipation, abdominal pain, hematochezia or melena.  MUSCULOSKELETAL: No myalgias or arthralgias.  NEUROLOGIC: No chronic headaches, no seizures. Patient denies numbness, tingling or weakness.  PSYCHIATRIC: Patient denies problems with mood disturbance. No problems with anxiety.  ENDOCRINE: No excessive urination or excessive thirst.  DERMATOLOGIC: Patient denies any rashes or skin changes.    PHYSICAL EXAM:  There were no vitals taken for this visit.  Alert, well appearing, no acute distress, nourished, hydrated.  Anicteric sclera, no ptosis  Facial symmetry  Neck supple  Unlabored respirations.  Easily conversant without increased respiratory effort  Oriented to person, place, time.  Judgement intact.  Appropriate mood, affect.       ASSESSMENT & PLAN:  28 y.o. female presenting for follow up visit s/p bariatric surgery.    Weight Loss: Initial 250 ->   Current:   Wt Readings from Last 1 Encounters:   10/27/23 103 kg (226 lb 4.8 oz)       Problem List Items Addressed This Visit       Nausea in adult    Relevant Medications    esomeprazole (NexIUM Packet) 40 mg packet    sucralfate (Carafate) 1 gram tablet    ondansetron ODT (Zofran-ODT) 4 mg disintegrating tablet    Other Relevant Orders    US gallbladder    Postoperative malabsorption     Recheck iron & vitamin D         Relevant Medications    esomeprazole (NexIUM Packet) 40 mg packet    sucralfate (Carafate) 1 gram tablet    Other Relevant Orders    FL upper GI w KUB    FL GI esophagram    Iron and TIBC    Vitamin D 25-Hydroxy,Total (for eval of Vitamin D levels)    CBC    Ferritin    US gallbladder    S/P gastric bypass - Primary     S/P GBP (revison sleeve to gBP) 6/7/23 - complicated by postoperative GJ stricture requiring dilation in August 2023.  Initially did well after  dilation advancing diet but in past 2 weeks has been experiencing increased issues with diet intolerance, requiring reliance on shakes/smoothies/protein supplements to meet nutrition needs.  Able to tolerate PO fluids.   Taking appropriate supplements  Has continued Nexium once daily, had poor tolerance of omeprazole taste causing nausea.    Is c/o RUQ pain after eating in last 24-48 hours- may be related to ulcer but has gallbladder.     Plan:  -Continue soft/pureed foods and use protein shakes to meet nutritional needs.  - Continue to track fluids to ensure adequate hydration.  - Reviewed red flag symptoms requiring urgent/emergent evaluation for hydration or nutrition support.  - Check US to rule out gallbladder disease  - Update upper GI/esophagram, likely will need EGD evaluation for possible stricture dilation  - Increase Nexium to BID, open packet  - Add carafate AC/HS  - Will coordinate EGD with Dr. Gan.           Relevant Medications    esomeprazole (NexIUM Packet) 40 mg packet    sucralfate (Carafate) 1 gram tablet    ondansetron ODT (Zofran-ODT) 4 mg disintegrating tablet    Other Relevant Orders    FL upper GI w KUB    FL GI esophagram    Iron and TIBC    Vitamin D 25-Hydroxy,Total (for eval of Vitamin D levels)    CBC    Ferritin    US gallbladder    Dysphagia     Required GJ dilation 8/20023 s/p revision LSG to RGYBP 6/7/2023  Worsening dysphagia with regular diet.  Tolerating PO soft foods and protein shakes    Stat esophagram & upper GI, likely may need repeat EGD         Relevant Medications    esomeprazole (NexIUM Packet) 40 mg packet    sucralfate (Carafate) 1 gram tablet    Other Relevant Orders    FL upper GI w KUB    FL GI esophagram    US gallbladder

## 2023-11-06 NOTE — ASSESSMENT & PLAN NOTE
Required GJ dilation 8/20023 s/p revision LSG to RGYBP 6/7/2023  Worsening dysphagia with regular diet.  Tolerating PO soft foods and protein shakes    Stat esophagram & upper GI, likely may need repeat EGD

## 2023-11-06 NOTE — ASSESSMENT & PLAN NOTE
S/P GBP (revison sleeve to gBP) 6/7/23 - complicated by postoperative GJ stricture requiring dilation in August 2023.  Initially did well after dilation advancing diet but in past 2 weeks has been experiencing increased issues with diet intolerance, requiring reliance on shakes/smoothies/protein supplements to meet nutrition needs.  Able to tolerate PO fluids.   Taking appropriate supplements  Has continued Nexium once daily, had poor tolerance of omeprazole taste causing nausea.    Is c/o RUQ pain after eating in last 24-48 hours- may be related to ulcer but has gallbladder.     Plan:  -Continue soft/pureed foods and use protein shakes to meet nutritional needs.  - Continue to track fluids to ensure adequate hydration.  - Reviewed red flag symptoms requiring urgent/emergent evaluation for hydration or nutrition support.  - Check US to rule out gallbladder disease  - Update upper GI/esophagram, likely will need EGD evaluation for possible stricture dilation  - Increase Nexium to BID, open packet  - Add carafate AC/HS  - Will coordinate EGD with Dr. Gan.

## 2023-11-09 ENCOUNTER — APPOINTMENT (OUTPATIENT)
Dept: SURGERY | Facility: CLINIC | Age: 28
End: 2023-11-09
Payer: COMMERCIAL

## 2023-11-20 ENCOUNTER — TELEPHONE (OUTPATIENT)
Dept: SURGERY | Facility: CLINIC | Age: 28
End: 2023-11-20
Payer: COMMERCIAL

## 2023-11-20 NOTE — TELEPHONE ENCOUNTER
From: Jeannette Monzon <[email or web address]   Sent: 2023 3:38 PM  To: Adeola Pérez <[email or web address]>  Subject:     Hello,   this is jeannette monzon ( 89) i was emailing you because i had surgery in 2023 and at the time i had signed up for school online at  and thought i would be able to do it but because of the complications and the multiple extensions of the leave from work and the infusions and endoscopies i was unable to actually do my coursework and couldn't attend but i want to attend again and to have a tuition forgiveness i need proof of having surgery and complications to submit     jeannette monzon                  Here you go!!     Adeola Pérez, BSN, RN   Assistant Nurse Manager   Bariatric and Metabolic Surgery   [address] [place] [address]   P: [phone] F: [phone]  Available via Epic Secure Chat

## 2023-11-28 ENCOUNTER — ANESTHESIA (OUTPATIENT)
Dept: GASTROENTEROLOGY | Facility: HOSPITAL | Age: 28
End: 2023-11-28
Payer: COMMERCIAL

## 2023-11-28 ENCOUNTER — ANESTHESIA EVENT (OUTPATIENT)
Dept: GASTROENTEROLOGY | Facility: HOSPITAL | Age: 28
End: 2023-11-28
Payer: COMMERCIAL

## 2023-11-28 ENCOUNTER — HOSPITAL ENCOUNTER (OUTPATIENT)
Dept: GASTROENTEROLOGY | Facility: HOSPITAL | Age: 28
Setting detail: OUTPATIENT SURGERY
Discharge: HOME | End: 2023-11-28
Payer: COMMERCIAL

## 2023-11-28 VITALS
TEMPERATURE: 98.1 F | DIASTOLIC BLOOD PRESSURE: 76 MMHG | BODY MASS INDEX: 35.36 KG/M2 | SYSTOLIC BLOOD PRESSURE: 109 MMHG | RESPIRATION RATE: 28 BRPM | OXYGEN SATURATION: 99 % | HEIGHT: 66 IN | WEIGHT: 220 LBS | HEART RATE: 84 BPM

## 2023-11-28 DIAGNOSIS — Z98.84 S/P GASTRIC BYPASS: Primary | ICD-10-CM

## 2023-11-28 DIAGNOSIS — R13.10 DYSPHAGIA, UNSPECIFIED TYPE: ICD-10-CM

## 2023-11-28 PROCEDURE — A43235 PR ESOPHAGOGASTRODUODENOSCOPY TRANSORAL DIAGNOSTIC

## 2023-11-28 PROCEDURE — 3700000001 HC GENERAL ANESTHESIA TIME - INITIAL BASE CHARGE: Performed by: SURGERY

## 2023-11-28 PROCEDURE — A43235 PR ESOPHAGOGASTRODUODENOSCOPY TRANSORAL DIAGNOSTIC: Performed by: ANESTHESIOLOGY

## 2023-11-28 PROCEDURE — 43235 EGD DIAGNOSTIC BRUSH WASH: CPT | Performed by: SURGERY

## 2023-11-28 PROCEDURE — 7100000010 HC PHASE TWO TIME - EACH INCREMENTAL 1 MINUTE: Performed by: SURGERY

## 2023-11-28 PROCEDURE — 2500000004 HC RX 250 GENERAL PHARMACY W/ HCPCS (ALT 636 FOR OP/ED)

## 2023-11-28 PROCEDURE — 7100000009 HC PHASE TWO TIME - INITIAL BASE CHARGE: Performed by: SURGERY

## 2023-11-28 PROCEDURE — 3700000002 HC GENERAL ANESTHESIA TIME - EACH INCREMENTAL 1 MINUTE: Performed by: SURGERY

## 2023-11-28 PROCEDURE — 2500000005 HC RX 250 GENERAL PHARMACY W/O HCPCS

## 2023-11-28 RX ORDER — OXYCODONE HYDROCHLORIDE 5 MG/1
5 TABLET ORAL EVERY 4 HOURS PRN
Status: CANCELLED | OUTPATIENT
Start: 2023-11-28

## 2023-11-28 RX ORDER — ONDANSETRON HYDROCHLORIDE 2 MG/ML
INJECTION, SOLUTION INTRAVENOUS AS NEEDED
Status: DISCONTINUED | OUTPATIENT
Start: 2023-11-28 | End: 2023-11-28

## 2023-11-28 RX ORDER — LIDOCAINE HYDROCHLORIDE 10 MG/ML
0.1 INJECTION INFILTRATION; PERINEURAL ONCE
Status: CANCELLED | OUTPATIENT
Start: 2023-11-28 | End: 2023-11-28

## 2023-11-28 RX ORDER — SODIUM CHLORIDE, SODIUM LACTATE, POTASSIUM CHLORIDE, CALCIUM CHLORIDE 600; 310; 30; 20 MG/100ML; MG/100ML; MG/100ML; MG/100ML
100 INJECTION, SOLUTION INTRAVENOUS CONTINUOUS
Status: CANCELLED | OUTPATIENT
Start: 2023-11-28

## 2023-11-28 RX ORDER — ONDANSETRON HYDROCHLORIDE 2 MG/ML
4 INJECTION, SOLUTION INTRAVENOUS ONCE AS NEEDED
Status: CANCELLED | OUTPATIENT
Start: 2023-11-28

## 2023-11-28 RX ORDER — ACETAMINOPHEN 325 MG/1
650 TABLET ORAL EVERY 4 HOURS PRN
Status: CANCELLED | OUTPATIENT
Start: 2023-11-28

## 2023-11-28 RX ORDER — PROPOFOL 10 MG/ML
INJECTION, EMULSION INTRAVENOUS AS NEEDED
Status: DISCONTINUED | OUTPATIENT
Start: 2023-11-28 | End: 2023-11-28

## 2023-11-28 RX ORDER — FENTANYL CITRATE 50 UG/ML
50 INJECTION, SOLUTION INTRAMUSCULAR; INTRAVENOUS EVERY 5 MIN PRN
Status: CANCELLED | OUTPATIENT
Start: 2023-11-28

## 2023-11-28 RX ORDER — LIDOCAINE HYDROCHLORIDE 20 MG/ML
INJECTION, SOLUTION INFILTRATION; PERINEURAL AS NEEDED
Status: DISCONTINUED | OUTPATIENT
Start: 2023-11-28 | End: 2023-11-28

## 2023-11-28 RX ORDER — SODIUM CHLORIDE, SODIUM LACTATE, POTASSIUM CHLORIDE, CALCIUM CHLORIDE 600; 310; 30; 20 MG/100ML; MG/100ML; MG/100ML; MG/100ML
20 INJECTION, SOLUTION INTRAVENOUS CONTINUOUS
Status: DISCONTINUED | OUTPATIENT
Start: 2023-11-28 | End: 2023-11-29 | Stop reason: HOSPADM

## 2023-11-28 RX ORDER — PROPOFOL 10 MG/ML
INJECTION, EMULSION INTRAVENOUS CONTINUOUS PRN
Status: DISCONTINUED | OUTPATIENT
Start: 2023-11-28 | End: 2023-11-28

## 2023-11-28 RX ADMIN — ONDANSETRON 4 MG: 2 INJECTION, SOLUTION INTRAMUSCULAR; INTRAVENOUS at 09:06

## 2023-11-28 RX ADMIN — PROPOFOL 20 MG: 10 INJECTION, EMULSION INTRAVENOUS at 09:04

## 2023-11-28 RX ADMIN — LIDOCAINE HYDROCHLORIDE 40 MG: 20 INJECTION, SOLUTION INFILTRATION; PERINEURAL at 09:03

## 2023-11-28 RX ADMIN — PROPOFOL 150 MCG/KG/MIN: 10 INJECTION, EMULSION INTRAVENOUS at 09:03

## 2023-11-28 RX ADMIN — PROPOFOL 30 MG: 10 INJECTION, EMULSION INTRAVENOUS at 09:05

## 2023-11-28 RX ADMIN — PROPOFOL 50 MG: 10 INJECTION, EMULSION INTRAVENOUS at 09:03

## 2023-11-28 RX ADMIN — SODIUM CHLORIDE, SODIUM LACTATE, POTASSIUM CHLORIDE, AND CALCIUM CHLORIDE: 600; 310; 30; 20 INJECTION, SOLUTION INTRAVENOUS at 09:03

## 2023-11-28 ASSESSMENT — ENCOUNTER SYMPTOMS
FLANK PAIN: 0
RHINORRHEA: 0
HALLUCINATIONS: 0
APNEA: 0
DIFFICULTY URINATING: 0
CHOKING: 0
NAUSEA: 0
NUMBNESS: 0
CONFUSION: 0
POLYDIPSIA: 0
TREMORS: 0
AGITATION: 0
CHILLS: 0
VOMITING: 0
FACIAL SWELLING: 0
SINUS PAIN: 0
COLOR CHANGE: 0
CHEST TIGHTNESS: 0
POLYPHAGIA: 0
FEVER: 0
LIGHT-HEADEDNESS: 0
PALPITATIONS: 0

## 2023-11-28 ASSESSMENT — PAIN SCALES - GENERAL
PAINLEVEL_OUTOF10: 0 - NO PAIN

## 2023-11-28 ASSESSMENT — COLUMBIA-SUICIDE SEVERITY RATING SCALE - C-SSRS
1. IN THE PAST MONTH, HAVE YOU WISHED YOU WERE DEAD OR WISHED YOU COULD GO TO SLEEP AND NOT WAKE UP?: NO
2. HAVE YOU ACTUALLY HAD ANY THOUGHTS OF KILLING YOURSELF?: NO
6. HAVE YOU EVER DONE ANYTHING, STARTED TO DO ANYTHING, OR PREPARED TO DO ANYTHING TO END YOUR LIFE?: NO

## 2023-11-28 ASSESSMENT — PAIN - FUNCTIONAL ASSESSMENT
PAIN_FUNCTIONAL_ASSESSMENT: 0-10
PAIN_FUNCTIONAL_ASSESSMENT: 0-10

## 2023-11-28 NOTE — ANESTHESIA PREPROCEDURE EVALUATION
Patient: Cecelia Phillips    Procedure Information       Date/Time: 11/28/23 0800    Scheduled providers: Stella Gan MD; Deedee Sánchez RN    Procedure: EGD    Location: [facility]            Relevant Problems   Anesthesia   (+) Post-operative nausea and vomiting      Endocrine   (+) Goiter      GI   (+) Gastroesophageal reflux disease   (+) Hiatal hernia with gastroesophageal reflux      Neuro/Psych   (+) Anxiety and depression   (+) Moderate episode of recurrent major depressive disorder (CMS/HCC)      Hematology   (+) Iron deficiency anemia      Infectious Disease   (+) Candidiasis of vagina       Clinical information reviewed:   Tobacco  Allergies  Meds   Med Hx  Surg Hx  OB Status  Fam Hx  Soc   Hx        NPO Detail:  NPO/Void Status  Date of Last Liquid: 11/27/23  Time of Last Liquid: 2359  Date of Last Solid: 11/27/23  Time of Last Solid: 2100         Physical Exam    Airway  Mallampati: III  TM distance: >3 FB  Neck ROM: full     Cardiovascular    Dental - normal exam     Pulmonary    Abdominal            Anesthesia Plan    ASA 3     MAC     Anesthetic plan and risks discussed with patient.

## 2023-11-28 NOTE — H&P
History Of Present Illness  Cecelia Phillips is a 28 y.o. female presenting with PO intolerance nausea and vomiting. Patient is s/p a conversion of her sleeve gastrectomy to a gastric bypass on 6/7/23. Patient has approximate BMI of 35 with related comorbidities of morbid obesity, heartburn, and PCOS.   Patient is s/p sleeve gastrectomy 12/1/2021 at [facility]     Past Medical History  Past Medical History:   Diagnosis Date    Acute streptococcal tonsillitis, unspecified 09/04/2019    Strep tonsillitis    Encounter for contraceptive management, unspecified 05/20/2020    Contraception management    Encounter for insertion of intrauterine contraceptive device 06/17/2020    Encounter for insertion of mirena IUD    Encounter for insertion of intrauterine contraceptive device 02/09/2016    Encounter for IUD insertion    Encounter for insertion of intrauterine contraceptive device 11/16/2015    Encounter for insertion of mirena IUD    Encounter for pregnancy test, result negative 02/09/2016    Pregnancy test negative    Encounter for screening for infections with a predominantly sexual mode of transmission 02/09/2016    Screen for STD (sexually transmitted disease)    Inadequate sleep hygiene 04/15/2021    History of difficulty sleeping    Low back pain, unspecified 06/15/2017    Acute low back pain    Morbid (severe) obesity due to excess calories (CMS/HCC) 07/06/2020    Class 3 severe obesity due to excess calories with serious comorbidity and body mass index (BMI) of 40.0 to 44.9 in adult    Nontoxic goiter, unspecified 04/18/2021    Goiter    Other specified anxiety disorders 11/09/2015    Depression with anxiety    Other specified disorders of eustachian tube, right ear 08/08/2019    Dysfunction of right eustachian tube    Papillomavirus as the cause of diseases classified elsewhere 09/21/2017    HPV in female    Personal history of diseases of the skin and subcutaneous tissue 08/09/2019    History of acne    Personal history  of diseases of the skin and subcutaneous tissue 02/26/2016    History of cellulitis    Personal history of diseases of the skin and subcutaneous tissue 02/26/2016    History of skin pruritus    Personal history of other diseases of the female genital tract 01/06/2017    History of dysmenorrhea    Personal history of other diseases of the female genital tract 12/07/2015    History of irregular menstrual cycles    Personal history of other diseases of the female genital tract 11/09/2015    History of vaginitis    Personal history of other diseases of the female genital tract 10/11/2018    History of irregular menstrual cycles    Personal history of other diseases of the respiratory system 04/28/2016    History of acute sinusitis    Personal history of other diseases of the respiratory system 04/28/2016    History of upper respiratory infection    Personal history of other diseases of the respiratory system 10/08/2021    History of acute sinusitis    Personal history of other endocrine, nutritional and metabolic disease 12/07/2015    History of hyperprolactinemia    Personal history of other infectious and parasitic diseases 10/14/2021    History of candidiasis of vagina    Personal history of other specified conditions 11/16/2015    History of urinary frequency    Persons encountering health services in other specified circumstances 08/09/2019    Establishing care with new doctor, encounter for    Right upper quadrant pain 04/28/2016    Right upper quadrant abdominal pain    Somnolence 04/15/2021    Daytime sleepiness    Strain of muscle, fascia and tendon of lower back, initial encounter 10/10/2016    Lumbar strain       Surgical History  Past Surgical History:   Procedure Laterality Date    OTHER SURGICAL HISTORY  01/03/2022    Sleeve gastrectomy    TONSILLECTOMY  11/02/2015    Tonsillectomy With Adenoidectomy        Social History  She reports that she has never smoked. She has never used smokeless tobacco. She  reports that she does not drink alcohol and does not use drugs.    Family History  Family History   Problem Relation Name Age of Onset    Other (Diabetes mellitus) Mother      No Known Problems Father      Prostate cancer Father's Brother      Hypertension Maternal Grandmother      Esophageal cancer Paternal Grandfather          Allergies  Patient has no known allergies.    Review of Systems   Constitutional:  Negative for chills and fever.   HENT:  Negative for facial swelling, nosebleeds, rhinorrhea and sinus pain.    Respiratory:  Negative for apnea, choking and chest tightness.    Cardiovascular:  Negative for chest pain, palpitations and leg swelling.   Gastrointestinal:  Negative for nausea and vomiting.   Endocrine: Negative for polydipsia, polyphagia and polyuria.   Genitourinary:  Negative for difficulty urinating, flank pain and urgency.   Skin:  Negative for color change, pallor and rash.   Neurological:  Negative for tremors, light-headedness and numbness.   Psychiatric/Behavioral:  Negative for agitation, behavioral problems, confusion, hallucinations and self-injury.         Physical Exam  Constitutional:       General: She is not in acute distress.     Appearance: Normal appearance.   HENT:      Head: Normocephalic and atraumatic.      Nose: Nose normal.      Mouth/Throat:      Pharynx: Oropharynx is clear.   Cardiovascular:      Rate and Rhythm: Normal rate and regular rhythm.   Pulmonary:      Effort: Pulmonary effort is normal.      Breath sounds: No stridor. No wheezing.   Abdominal:      General: Abdomen is flat. There is no distension.      Palpations: Abdomen is soft.      Tenderness: There is no abdominal tenderness.   Musculoskeletal:         General: No swelling, tenderness or deformity.      Cervical back: Normal range of motion and neck supple.   Skin:     General: Skin is warm and dry.   Neurological:      General: No focal deficit present.      Mental Status: She is alert and oriented to  "person, place, and time. Mental status is at baseline.   Psychiatric:         Mood and Affect: Mood normal.         Behavior: Behavior normal.         Judgment: Judgment normal.          Last Recorded Vitals  Blood pressure 125/78, pulse 84, temperature 36.4 °C (97.5 °F), resp. rate 18, height 1.676 m (5' 6\"), weight 99.8 kg (220 lb), SpO2 99 %.    Relevant Results             Assessment/Plan   Active Problems:  There are no active Hospital Problems.      EGD with dilation        I spent 25 minutes in the professional and overall care of this patient.      Scot Chirinos MD    "

## 2023-11-28 NOTE — ANESTHESIA POSTPROCEDURE EVALUATION
Patient: Cecelia Phillips    Procedure Summary       Date: 11/28/23 Room / Location: [facility]    Anesthesia Start: 0857 Anesthesia Stop: 0921    Procedure: EGD Diagnosis:       S/P gastric bypass      Dysphagia, unspecified type      S/P gastric bypass    Scheduled Providers: Stella Gan MD; Deedee Sánchez RN; Dori Silva MD Responsible Provider: Dori Silva MD    Anesthesia Type: MAC ASA Status: 3            Anesthesia Type: MAC    Vitals Value Taken Time   /77 11/28/23 0932   Temp 36.7 °C (98.1 °F) 11/28/23 0917   Pulse 83 11/28/23 0932   Resp 16 11/28/23 0932   SpO2 98 % 11/28/23 0932       Anesthesia Post Evaluation    Patient location during evaluation: PACU  Patient participation: complete - patient participated  Level of consciousness: awake  Pain management: adequate  Airway patency: patent  Cardiovascular status: acceptable  Respiratory status: acceptable  Hydration status: acceptable  Postoperative Nausea and Vomiting: none        No notable events documented.

## 2023-11-29 ENCOUNTER — HOSPITAL ENCOUNTER (OUTPATIENT)
Dept: RADIOLOGY | Facility: HOSPITAL | Age: 28
Discharge: HOME | End: 2023-11-29
Payer: COMMERCIAL

## 2023-11-29 DIAGNOSIS — K91.2 POSTOPERATIVE MALABSORPTION (HHS-HCC): ICD-10-CM

## 2023-11-29 DIAGNOSIS — Z98.84 S/P GASTRIC BYPASS: ICD-10-CM

## 2023-11-29 DIAGNOSIS — R11.0 NAUSEA IN ADULT: ICD-10-CM

## 2023-11-29 DIAGNOSIS — R13.10 DYSPHAGIA, UNSPECIFIED TYPE: ICD-10-CM

## 2023-11-29 PROCEDURE — 76705 ECHO EXAM OF ABDOMEN: CPT | Performed by: RADIOLOGY

## 2023-11-29 PROCEDURE — 76705 ECHO EXAM OF ABDOMEN: CPT

## 2023-12-01 ENCOUNTER — APPOINTMENT (OUTPATIENT)
Dept: SURGERY | Facility: CLINIC | Age: 28
End: 2023-12-01
Payer: COMMERCIAL

## 2023-12-01 ENCOUNTER — TELEMEDICINE CLINICAL SUPPORT (OUTPATIENT)
Dept: SURGERY | Facility: CLINIC | Age: 28
End: 2023-12-01
Payer: COMMERCIAL

## 2023-12-01 ENCOUNTER — TELEMEDICINE (OUTPATIENT)
Dept: SURGERY | Facility: CLINIC | Age: 28
End: 2023-12-01
Payer: COMMERCIAL

## 2023-12-01 DIAGNOSIS — Z98.84 S/P GASTRIC BYPASS: Primary | ICD-10-CM

## 2023-12-01 DIAGNOSIS — K82.8 GALLBLADDER SLUDGE: ICD-10-CM

## 2023-12-01 DIAGNOSIS — R11.0 NAUSEA IN ADULT: ICD-10-CM

## 2023-12-01 PROCEDURE — 99215 OFFICE O/P EST HI 40 MIN: CPT | Mod: 95 | Performed by: NURSE PRACTITIONER

## 2023-12-01 PROCEDURE — 99215 OFFICE O/P EST HI 40 MIN: CPT | Performed by: NURSE PRACTITIONER

## 2023-12-01 RX ORDER — URSODIOL 250 MG/1
250 TABLET, FILM COATED ORAL 2 TIMES DAILY
Qty: 60 TABLET | Refills: 2 | Status: SHIPPED | OUTPATIENT
Start: 2023-12-01

## 2023-12-01 NOTE — PROGRESS NOTES
BARIATRIC SURGERY CLINIC  FOLLOW UP NOTE      Name: Cecelia Phillips  MRN: [id number]    Index Surgery  Date of Surgery: 6/7/23    Surgeon: Malik    Surgical Procedure: Revision: LSG to Bypass 60378    HPI:   Presenting for follow up visit.  Had revision LSG to Bypass.  Had EGD with dilation GJ anastamosis with Dr. Gan in August 2023.  Recent EGD with Dr. Gan last week 11/20/23 - no obvious pathology    Had recent RUQ US with sludge only.     Diet Had to revert back to soft foods diet due to poor diet tolerance, emesis after solid/regular diet.  Meeting protein and fluid requirements with assistance of shakes.  Able to do smoothies and pureed.  Sometimes pureed/soft foods trigger nausea & vomiting.   Feels similar to prior to her dilation.     Concerns related to:  Nausea/Vomiting, Reflux: + see above  Abdominal Pain: Had 1 episode of RUQ after eating yesterday, resolved spontaneously.    Diarrhea/Constipation Has struggled with constipation historically    DAILY SUPPLEMENTS:  Calcium: Calcium Citrate w/ vitamin D (1200 - 1500mg)  Multivitamin & Minerals: 2 per day  Vitamin B12: 500 mcg/day   Vitamin D3: 3000 units  Iron/Other: iron daily  PPI:Nexium once daily      Current Outpatient Medications   Medication Sig Dispense Refill    calcium carbonate-vitamin D3 1,000 mg-20 mcg (800 unit) tablet Take by mouth.      ergocalciferol (Vitamin D-2) 1.25 MG (83159 UT) capsule Take 1 capsule twice weekly x 8 weeks then start Vitamin D3 2000 units daily over the counter      esomeprazole (NexIUM Packet) 40 mg packet Take 40 mg by mouth once daily in the morning. Take before meals. Mix packet in 10mL water and take twice daily 60 packet 3    ferrous gluconate 236 mg (27 mg iron) tablet Take by mouth.      fluocinolone (DermOtic) 0.01 % ear drops Administer 5 drops into each ear 2 times a day. (Patient not taking: Reported on 11/28/2023) 20 mL 0    hydrOXYzine HCL (Atarax) 25 mg tablet Take 1 tablet (25 mg) by mouth once daily  at bedtime. 90 tablet 1    levonorgestrel (Mirena) 21 mcg/24 hours (8 yrs) 52 mg IUD by intrauterine route.      melatonin 3 mg tablet Take by mouth.      multivitamin-min-iron-FA-vit K (Adults Multivitamin) 18 mg iron-400 mcg-25 mcg tablet Take by mouth.      omeprazole (PriLOSEC) 40 mg DR capsule Take by mouth once daily.      ondansetron ODT (Zofran-ODT) 4 mg disintegrating tablet Take 1-2 tablets (4-8 mg) by mouth every 8 hours if needed for nausea or vomiting. 30 tablet 3    promethazine (Phenergan) 12.5 mg tablet Take 1 tablet (12.5 mg) by mouth every 6 hours if needed for nausea.      sucralfate (Carafate) 1 gram tablet Take 1 tablet (1 g) by mouth 4 times a day before meals. Crush tablet and mix in 10mL water (Patient not taking: Reported on 11/28/2023) 120 tablet 3    Vitamin B-1 100 mg tablet Take 0.5 tablets (50 mg) by mouth once daily.       No current facility-administered medications for this visit.       Comorbidities:  Patient Active Problem List   Diagnosis    Acne    Allergies    Anxiety and depression    Attention deficit    Goiter    Gastroesophageal reflux disease    Hiatal hernia with gastroesophageal reflux    History of bariatric surgery    IUD contraception    Migraine without aura and responsive to treatment    Moderate episode of recurrent major depressive disorder (CMS/HCC)    PCOS (polycystic ovarian syndrome)    Vitamin D deficiency    Candidiasis of vagina    Constipation, acute    Dehydration    Iron deficiency anemia    Nausea in adult    Post-op pain    Post-operative nausea and vomiting    Postoperative malabsorption    Psychological factors affecting medical condition    S/P gastric bypass    Dysphagia    Gallbladder sludge         REVIEW OF SYSTEMS:  CONSTITUTIONAL: Patient denies fevers, chills, sweats and weight changes.  CARDIOVASCULAR: Patient denies chest pains, palpitations, orthopnea and paroxysmal nocturnal dyspnea.  RESPIRATORY: No dyspnea on exertion, no wheezing or  cough.  GI: No nausea, vomiting, diarrhea, constipation, abdominal pain, hematochezia or melena.  MUSCULOSKELETAL: No myalgias or arthralgias.  NEUROLOGIC: No chronic headaches, no seizures. Patient denies numbness, tingling or weakness.  PSYCHIATRIC: Patient denies problems with mood disturbance. No problems with anxiety.  ENDOCRINE: No excessive urination or excessive thirst.  DERMATOLOGIC: Patient denies any rashes or skin changes.    PHYSICAL EXAM:  There were no vitals taken for this visit.  Alert, well appearing, no acute distress, nourished, hydrated.  Anicteric sclera, no ptosis  Facial symmetry  Neck supple  Unlabored respirations.  Easily conversant without increased respiratory effort  Oriented to person, place, time.  Judgement intact.  Appropriate mood, affect.       ASSESSMENT & PLAN:  28 y.o. female presenting for follow up visit s/p bariatric surgery.    Weight Loss: Initial 250 ->   Current:   Wt Readings from Last 1 Encounters:   11/28/23 99.8 kg (220 lb)       Problem List Items Addressed This Visit       Gastroesophageal reflux disease    S/P gastric bypass - Primary    Gallbladder sludge

## 2023-12-01 NOTE — PROGRESS NOTES
Surgery Date:  6/7/23  Surgeon:   Malik  Procedure:  sleeve gastrectomy     ASSESSMENT:    Current weight pounds:    220.0              Ht: 66.1 in   BMI: 35.5   Previous weight pounds:   no weight  Initial start weight pounds:   250.0   2/16/23  EBW pounds:  95.0  Total weight change pounds:    30.0  %EBW Lost: 31.6%    PROGRESS:    Nutrition Interventions for last encounter (date):   1. Continue to have a good source of protein first at each meal & snack. Aim for 60-70 grams/day.  2. Continue to have 64oz of calorie-free, caffeine-free and non-carbonated beverages daily-water.  3. Continue to stop drinking 30 minutes before meals, nothing with meals and wait 30 minutes after meals to drink again. Make meals last 30 minutes-chew thoroughly.   4. Continue with daily multivitamin (2 [place] COMPLETE MVI-both at breakfast), 1200-1500mg of calcium citrate per day (500-600mg at lunch, dinner AND before bed) and 500mcg of vitamin B12 per day (at breakfast).   5. Increase physical activity by 10-15 minutes as tolerated to an end goal of 60 minutes 5 x per week. Working with PT at gym   6. Come to support group monthly     CHANGES IN TREATMENT:   Patient met goals:   Partially   Actions to meet previous goals:   24 hour food recall:   Breakfast:   protein shake  Snack:   Lunch:   none  Snack:     Dinner:  salmon and sweet potato  Snack:   Beverages:    84 oz water/day, 1-2 protein shakes/day  Alcohol:   none      Vitamins:    2 Flintstones,     250-500   mg calcium, and B12  Medications: see list  Physical Activity:   none d/t pain from GB    READINESS TO LEARN:  Motivation to learn:           Interested      Understanding of instruction: Good     Anticipated Compliance: Good       Family Support: Unable to assess-family not present     Patient presents with post-op weight loss surgery sleeve gastrectomy. Pt is 6 months post op.   Patient has lost 30.0 pounds since initial assessment accounting for 31.6% loss excess  body weight.  Tolerating a regular diet with difficulty. Protein intake is not adequate for post-op individual. Fluid consumption is adequate. Patient is supplementing recommended vitamin/minerals but not taking enough calcium.   Pt states concerns/difficulties with N/V and pain from her gall bladder.  She is struggling with eating d/t pain.  Advised to follow NP's instructions with eating soft foods, stay hydrated and eat what she tolerates.      Malnutrition Screening  Significant unintentional weight loss? n/a  Eating less than 75% of usual intake for more than 2 weeks? n/a     Nutrition Diagnosis:   1. Increased protein and nutrition needs related to altered GI function as evidenced by pt. s/p sleeve gastrectomy.  2. Food- and nutrition-related knowledge deficit related to lack of prior exposure to surgical weight loss information as evidenced by diet recall.     Nutrition Interventions:   1. Modify type and amount of food and nutrients within meals and snacks.  2. Comprehensive Nutrition Education  -Nutrition education materials: SG schedule         Recommendations:  Hope you feel  better soon  1. Eat/drink  60-70 g of protein per day as you tolerate it.   2. Continue to drink 64 oz. of zero calorie beverages per day  3. Continue no drinking 30 min before, during the meal and for 30 minutes after the meal  4. Continue current vit/min regimen.  Take enough calcium to meet a goal of 5858-2205 mg daily.  Take 9508-0049 mg of calcium citrate daily.  Take in in divided doses of no more than 500-600 mg at a time 2 hours apart from each other and from your Flintstones.   6.             Attend monthly support group    Nutrition Monitoring and Evaluation:   1-2 pounds weight loss per week  Criteria: weight check, food recall  Need for Follow-up:  as needed after GB issue is resolved.       Liya HARRIS, [facility]  Bariatric Surgery Dietitian  Phone: [phone]  Fax: [phone]

## 2023-12-01 NOTE — PROGRESS NOTES
BARIATRIC SURGERY CLINIC  FOLLOW UP NOTE      Name: Cecelia Phillips  MRN: [id number]    Index Surgery  Date of Surgery: 6/7/23    Surgeon: Malik    Surgical Procedure: Revision: LSG to Bypass 18259    HPI:   Presenting for follow up visit.  Had revision LSG to Bypass.  Had EGD with dilation GJ anastamosis with Dr. Gan in August 2023.  Recent EGD with Dr. Gan this week 11/20/23 - no pathology or acute issues.      Had recent RUQ US with sludge, no acute inflammation or stones present.       Diet Has continued to follow soft/blands diet.    Meeting protein and fluid requirements with assistance of shakes.  Able to do smoothies and pureed.  Sometimes pureed/soft foods trigger nausea & vomiting.       Concerns related to:  Nausea/Vomiting, Reflux: + see above  Abdominal Pain: Has noted increased post prandial RUQ in past 1-2 weeks.     Diarrhea/Constipation Has struggled with constipation historically    DAILY SUPPLEMENTS:  Calcium: Calcium Citrate w/ vitamin D (1200 - 1500mg)  Multivitamin & Minerals: 2 per day  Vitamin B12: 500 mcg/day   Vitamin D3: 3000 units  Iron/Other: iron daily  PPI:Nexium once daily      Current Outpatient Medications   Medication Sig Dispense Refill    calcium carbonate-vitamin D3 1,000 mg-20 mcg (800 unit) tablet Take by mouth.      ergocalciferol (Vitamin D-2) 1.25 MG (32377 UT) capsule Take 1 capsule twice weekly x 8 weeks then start Vitamin D3 2000 units daily over the counter      esomeprazole (NexIUM Packet) 40 mg packet Take 40 mg by mouth once daily in the morning. Take before meals. Mix packet in 10mL water and take twice daily 60 packet 3    ferrous gluconate 236 mg (27 mg iron) tablet Take by mouth.      hydrOXYzine HCL (Atarax) 25 mg tablet Take 1 tablet (25 mg) by mouth once daily at bedtime. 90 tablet 1    levonorgestrel (Mirena) 21 mcg/24 hours (8 yrs) 52 mg IUD by intrauterine route.      multivitamin-min-iron-FA-vit K (Adults Multivitamin) 18 mg iron-400 mcg-25 mcg tablet  Take by mouth.      omeprazole (PriLOSEC) 40 mg DR capsule Take by mouth once daily.      ondansetron ODT (Zofran-ODT) 4 mg disintegrating tablet Take 1-2 tablets (4-8 mg) by mouth every 8 hours if needed for nausea or vomiting. 30 tablet 3    promethazine (Phenergan) 12.5 mg tablet Take 1 tablet (12.5 mg) by mouth every 6 hours if needed for nausea.      ursodiol (ANGELO 250) 250 mg tablet Take 1 tablet (250 mg) by mouth 2 times a day. 60 tablet 2    Vitamin B-1 100 mg tablet Take 0.5 tablets (50 mg) by mouth once daily.       No current facility-administered medications for this visit.       Comorbidities:  Patient Active Problem List   Diagnosis    Acne    Allergies    Anxiety and depression    Attention deficit    Goiter    Gastroesophageal reflux disease    Hiatal hernia with gastroesophageal reflux    History of bariatric surgery    IUD contraception    Migraine without aura and responsive to treatment    Moderate episode of recurrent major depressive disorder (CMS/HCC)    PCOS (polycystic ovarian syndrome)    Vitamin D deficiency    Candidiasis of vagina    Constipation, acute    Dehydration    Iron deficiency anemia    Nausea in adult    Post-op pain    Post-operative nausea and vomiting    Postoperative malabsorption    Psychological factors affecting medical condition    S/P gastric bypass    Dysphagia    Gallbladder sludge         REVIEW OF SYSTEMS:  CONSTITUTIONAL: Patient denies fevers, chills, sweats and weight changes.  CARDIOVASCULAR: Patient denies chest pains, palpitations, orthopnea and paroxysmal nocturnal dyspnea.  RESPIRATORY: No dyspnea on exertion, no wheezing or cough.  GI: No nausea, vomiting, diarrhea, constipation, abdominal pain, hematochezia or melena.  MUSCULOSKELETAL: No myalgias or arthralgias.  NEUROLOGIC: No chronic headaches, no seizures. Patient denies numbness, tingling or weakness.  PSYCHIATRIC: Patient denies problems with mood disturbance. No problems with anxiety.  ENDOCRINE:  No excessive urination or excessive thirst.  DERMATOLOGIC: Patient denies any rashes or skin changes.    PHYSICAL EXAM:  There were no vitals taken for this visit.  Alert, well appearing, no acute distress, nourished, hydrated.  Anicteric sclera, no ptosis  Facial symmetry  Neck supple  Unlabored respirations.  Easily conversant without increased respiratory effort  Oriented to person, place, time.  Judgement intact.  Appropriate mood, affect.       ASSESSMENT & PLAN:  28 y.o. female presenting for follow up visit s/p bariatric surgery.    Weight Loss: Initial 250 ->   Current:   Wt Readings from Last 1 Encounters:   11/28/23 99.8 kg (220 lb)       Problem List Items Addressed This Visit       Nausea in adult    Relevant Orders    NM hepatobiliary w cholecystokinin    S/P gastric bypass - Primary     S/P GBP (revison sleeve to gBP) 6/7/23 - complicated by postoperative GJ stricture requiring dilation in August 2023.    Had follow up EGD 11/28/23 without any evidence of stricture, ulcer or acute pathology.   Continues to have poor diet tolerance with frequent nausea, occ vomiting (zofran helpful if taken pre meal), intermittent RUQ pain.   Recent RUQ US with sludge.    Taking appropriate supplements  Has continued Nexium once daily, had poor tolerance of omeprazole taste causing nausea.      Plan:  -Continue soft/pureed foods and use protein shakes to meet nutritional needs.  - Continue to track fluids & protein to ensure adequate hydration.  - Reviewed red flag symptoms requiring urgent/emergent evaluation for hydration, nutrition support.  - Complete HIDA with EF to further evaluate for gallbladder disease.  - Continue Nexium daily  - Add ursodiol BID           Relevant Medications    ursodiol (ANGELO 250) 250 mg tablet    Other Relevant Orders    NM hepatobiliary w cholecystokinin    Gallbladder sludge     S/P LSG to GBP revision 6/7/2023  Recent RUQ  US with gallbladder sludge  Having increased post prandial nausea  and occ vomiting, intermittent RUQ pain.  Proceed with HIDA/EF  Add ursodiol    Follow up with Dr. Gan after HIDA to evaluate if cholecystectomy advised.     Reviewed red flag symptoms warranting ER evaluation for acute cholecystitis.          Relevant Medications    ursodiol (ANGELO 250) 250 mg tablet    Other Relevant Orders    NM hepatobiliary w cholecystokinin       I personally spent >50% of total minutes face to face with the patient in counseling and discussion and/or coordination of care as described above.

## 2023-12-01 NOTE — ASSESSMENT & PLAN NOTE
S/P GBP (revison sleeve to gBP) 6/7/23 - complicated by postoperative GJ stricture requiring dilation in August 2023.    Had follow up EGD 11/28/23 without any evidence of stricture, ulcer or acute pathology.   Continues to have poor diet tolerance with frequent nausea, occ vomiting (zofran helpful if taken pre meal), intermittent RUQ pain.   Recent RUQ US with sludge.    Taking appropriate supplements  Has continued Nexium once daily, had poor tolerance of omeprazole taste causing nausea.      Plan:  -Continue soft/pureed foods and use protein shakes to meet nutritional needs.  - Continue to track fluids & protein to ensure adequate hydration.  - Reviewed red flag symptoms requiring urgent/emergent evaluation for hydration, nutrition support.  - Complete HIDA with EF to further evaluate for gallbladder disease.  - Continue Nexium daily  - Add ursodiol BID

## 2023-12-01 NOTE — ASSESSMENT & PLAN NOTE
S/P LSG to GBP revision 6/7/2023  Recent RUQ  US with gallbladder sludge  Having increased post prandial nausea and occ vomiting, intermittent RUQ pain.  Proceed with HIDA/EF  Add ursodiol    Follow up with Dr. Gan after CECILIOA to evaluate if cholecystectomy advised.     Reviewed red flag symptoms warranting ER evaluation for acute cholecystitis.

## 2023-12-04 ENCOUNTER — HOSPITAL ENCOUNTER (EMERGENCY)
Facility: HOSPITAL | Age: 28
Discharge: HOME | End: 2023-12-04
Attending: EMERGENCY MEDICINE
Payer: COMMERCIAL

## 2023-12-04 ENCOUNTER — APPOINTMENT (OUTPATIENT)
Dept: RADIOLOGY | Facility: HOSPITAL | Age: 28
End: 2023-12-04
Payer: COMMERCIAL

## 2023-12-04 VITALS
WEIGHT: 220 LBS | RESPIRATION RATE: 18 BRPM | HEIGHT: 66 IN | SYSTOLIC BLOOD PRESSURE: 124 MMHG | DIASTOLIC BLOOD PRESSURE: 72 MMHG | BODY MASS INDEX: 35.36 KG/M2 | HEART RATE: 88 BPM | TEMPERATURE: 98 F | OXYGEN SATURATION: 98 %

## 2023-12-04 DIAGNOSIS — S06.5XAA SDH (SUBDURAL HEMATOMA) (MULTI): Primary | ICD-10-CM

## 2023-12-04 LAB
ALBUMIN SERPL BCP-MCNC: 3.4 G/DL (ref 3.4–5)
ALP SERPL-CCNC: 72 U/L (ref 33–110)
ALT SERPL W P-5'-P-CCNC: 15 U/L (ref 7–45)
ANION GAP SERPL CALC-SCNC: 10 MMOL/L (ref 10–20)
APPEARANCE UR: CLEAR
AST SERPL W P-5'-P-CCNC: 14 U/L (ref 9–39)
BASOPHILS # BLD AUTO: 0.06 X10*3/UL (ref 0–0.1)
BASOPHILS NFR BLD AUTO: 0.8 %
BILIRUB SERPL-MCNC: 0.3 MG/DL (ref 0–1.2)
BILIRUB UR STRIP.AUTO-MCNC: NEGATIVE MG/DL
BUN SERPL-MCNC: 6 MG/DL (ref 6–23)
CALCIUM SERPL-MCNC: 8.7 MG/DL (ref 8.6–10.3)
CHLORIDE SERPL-SCNC: 103 MMOL/L (ref 98–107)
CO2 SERPL-SCNC: 28 MMOL/L (ref 21–32)
COLOR UR: YELLOW
CREAT SERPL-MCNC: 0.59 MG/DL (ref 0.5–1.05)
EOSINOPHIL # BLD AUTO: 0.37 X10*3/UL (ref 0–0.7)
EOSINOPHIL NFR BLD AUTO: 4.7 %
ERYTHROCYTE [DISTWIDTH] IN BLOOD BY AUTOMATED COUNT: 15.6 % (ref 11.5–14.5)
GFR SERPL CREATININE-BSD FRML MDRD: >90 ML/MIN/1.73M*2
GLUCOSE SERPL-MCNC: 96 MG/DL (ref 74–99)
GLUCOSE UR STRIP.AUTO-MCNC: NEGATIVE MG/DL
HCG UR QL IA.RAPID: NEGATIVE
HCT VFR BLD AUTO: 35.5 % (ref 36–46)
HGB BLD-MCNC: 11.3 G/DL (ref 12–16)
IMM GRANULOCYTES # BLD AUTO: 0.05 X10*3/UL (ref 0–0.7)
IMM GRANULOCYTES NFR BLD AUTO: 0.6 % (ref 0–0.9)
KETONES UR STRIP.AUTO-MCNC: NEGATIVE MG/DL
LACTATE SERPL-SCNC: 1.2 MMOL/L (ref 0.4–2)
LEUKOCYTE ESTERASE UR QL STRIP.AUTO: NEGATIVE
LYMPHOCYTES # BLD AUTO: 4.2 X10*3/UL (ref 1.2–4.8)
LYMPHOCYTES NFR BLD AUTO: 53.6 %
MCH RBC QN AUTO: 24.4 PG (ref 26–34)
MCHC RBC AUTO-ENTMCNC: 31.8 G/DL (ref 32–36)
MCV RBC AUTO: 77 FL (ref 80–100)
MONOCYTES # BLD AUTO: 0.79 X10*3/UL (ref 0.1–1)
MONOCYTES NFR BLD AUTO: 10.1 %
NEUTROPHILS # BLD AUTO: 2.37 X10*3/UL (ref 1.2–7.7)
NEUTROPHILS NFR BLD AUTO: 30.2 %
NITRITE UR QL STRIP.AUTO: NEGATIVE
NRBC BLD-RTO: 0 /100 WBCS (ref 0–0)
PH UR STRIP.AUTO: 7 [PH]
PLATELET # BLD AUTO: 257 X10*3/UL (ref 150–450)
POTASSIUM SERPL-SCNC: 3.8 MMOL/L (ref 3.5–5.3)
PROT SERPL-MCNC: 6.8 G/DL (ref 6.4–8.2)
PROT UR STRIP.AUTO-MCNC: NEGATIVE MG/DL
RBC # BLD AUTO: 4.63 X10*6/UL (ref 4–5.2)
RBC # UR STRIP.AUTO: NEGATIVE /UL
SODIUM SERPL-SCNC: 137 MMOL/L (ref 136–145)
SP GR UR STRIP.AUTO: 1.02
UROBILINOGEN UR STRIP.AUTO-MCNC: NORMAL MG/DL
WBC # BLD AUTO: 7.8 X10*3/UL (ref 4.4–11.3)

## 2023-12-04 PROCEDURE — 81025 URINE PREGNANCY TEST: CPT | Performed by: EMERGENCY MEDICINE

## 2023-12-04 PROCEDURE — 36415 COLL VENOUS BLD VENIPUNCTURE: CPT | Performed by: EMERGENCY MEDICINE

## 2023-12-04 PROCEDURE — 80053 COMPREHEN METABOLIC PANEL: CPT | Performed by: EMERGENCY MEDICINE

## 2023-12-04 PROCEDURE — 72125 CT NECK SPINE W/O DYE: CPT

## 2023-12-04 PROCEDURE — 83605 ASSAY OF LACTIC ACID: CPT | Performed by: EMERGENCY MEDICINE

## 2023-12-04 PROCEDURE — 70486 CT MAXILLOFACIAL W/O DYE: CPT

## 2023-12-04 PROCEDURE — 81003 URINALYSIS AUTO W/O SCOPE: CPT | Performed by: EMERGENCY MEDICINE

## 2023-12-04 PROCEDURE — 70450 CT HEAD/BRAIN W/O DYE: CPT

## 2023-12-04 PROCEDURE — 99285 EMERGENCY DEPT VISIT HI MDM: CPT | Performed by: EMERGENCY MEDICINE

## 2023-12-04 PROCEDURE — 84075 ASSAY ALKALINE PHOSPHATASE: CPT | Performed by: EMERGENCY MEDICINE

## 2023-12-04 PROCEDURE — 72125 CT NECK SPINE W/O DYE: CPT | Performed by: RADIOLOGY

## 2023-12-04 PROCEDURE — 85025 COMPLETE CBC W/AUTO DIFF WBC: CPT | Performed by: EMERGENCY MEDICINE

## 2023-12-04 PROCEDURE — 70450 CT HEAD/BRAIN W/O DYE: CPT | Performed by: RADIOLOGY

## 2023-12-04 RX ORDER — ACETAMINOPHEN 325 MG/1
975 TABLET ORAL ONCE
Status: COMPLETED | OUTPATIENT
Start: 2023-12-04 | End: 2023-12-04

## 2023-12-04 RX ADMIN — ACETAMINOPHEN 975 MG: 325 TABLET ORAL at 22:53

## 2023-12-04 ASSESSMENT — PAIN SCALES - GENERAL
PAINLEVEL_OUTOF10: 0 - NO PAIN
PAINLEVEL_OUTOF10: 8
PAINLEVEL_OUTOF10: 6

## 2023-12-04 NOTE — ED PROVIDER NOTES
HPI   No chief complaint on file.      HPI:  Patient is a 28-year-old female with a past medical history significant for nephrolithiasis and chronic abdominal pain with a recent ultrasound of the right upper quadrant ordered by her GI physician, Dr. Gan, which showed sludge in the gallbladder but no other acute findings who presents for syncopal event.  Patient states that for months she has been experiencing abdominal pain in the right upper quadrant, nausea and vomiting.  She went to see GI and this is how they were able to diagnose her with gallbladder sludge.  Yesterday she was feeling pain in the usual spot without any new features when she leaned over and slipped on a rug.  She fell and hit the back of her head on the right occipital region.  She did not lose consciousness but did have an episode of vomiting which is not unusual for her.  She states that this was strictly a mechanical fall when she slipped on the rug when leaning forward owing to her chronic abdominal pain.  She is now complaining of pain to the right jaw with chewing and states that it is difficult to close her mouth but she has full range of motion of her jaw on exam.  Denies any other injuries at this time.  Denies any neurologic deficits.  She was ambulatory on arrival.      PMHx: As above  PSHx: Gastric bypass  FamilyHx: Denies pertinent  SocialHx: Denies pertinent  Allergies: NKDA  Medications: See Medication Reconciliation     ROS  As above but otherwise denies    Physical Exam    GENERAL: Awake and Alert, No Acute Distress  HEENT: AT/NC, PERRL, EOMI, Normal Oropharynx, No Signs of Dehydration  NECK: Normal Inspection, No JVD  CARDIOVASCULAR: RRR, No M/R/G  RESPIRATORY: CTA Bilaterally, No Wheezes, Rales or Rhonchi, Chest Wall Non-tender  ABDOMEN: Soft, non-tender abdomen, Normal Bowel Sounds, No Distention  BACK: No CVA Tenderness  SKIN: Normal Color, Warm, Dry, No Rashes   EXTREMITIES: Non-Tender, Full ROM, No Pedal Edema  NEURO:  A&O x 3, Normal Motor and Sensation, Normal Mood and Affect    Nursing Assessment and Vitals Reviewed    EKG on arrival showed a normal sinus rhythm at 69 bpm with a sinus arrhythmia.  No ischemic ST changes.  There are T wave inversions in lead III.    Medical Decision  Patient is seen and evaluated for mechanical fall yesterday that led to striking her head with subsequent pain to the right jaw and concerns that it was difficult to chew as a result.  She also had an episode of nausea vomiting which is not incredibly unusual for her given her history of chronic abdominal pain for which she is already under the care of GI with a recent ultrasound of the gallbladder that showed sludge.  Abdomen is actually quite soft, nontender nondistended.  Physical exam is unremarkable without any signs of neurologic deficits or facial trauma.  Given her complaints workup was performed.    Workup for patient included labs that did not reveal any significant electrolyte imbalances.  Lactate is 1.2.  She has no leukocytosis and mild anemia similar to prior.  Urinalysis is unremarkable.  hCG is negative.  CT of the C-spine was unremarkable.  CT of the head showed a very thin density around the posterior pelvis measuring less than 1 mm.  This is favored to be a minimal asymmetric calcification but short-term follow-up with CT in 3 to 6 hours advised for exclusion of tiny foci of extra-axial products.  No acute displaced facial bone injury.    Per neurosurgery patient is to have repeat CT of the head at 9:00.  CT was obtained and showed redemonstration of the subtle hypodensity along the left posterior anterior hemispheric falx measuring 1 to 1.5 mm in thickness without any interval change.  Findings are suspicious for a small subdural hematoma.  This was discussed with on-call neurosurgery, Dr. Landon.  Given patient still neurologically intact and imaging stable she is able to discharge at this time to follow-up with her primary care  doctor.  She is thoroughly educated on supportive care at home including taking Tylenol only for pain or discomfort.  She is educated on signs and symptoms that should prompt immediate turn to emergency room.                                    [place] Coma Scale Score: 15                  Patient History   Past Medical History:   Diagnosis Date    Acute streptococcal tonsillitis, unspecified 09/04/2019    Strep tonsillitis    Encounter for contraceptive management, unspecified 05/20/2020    Contraception management    Encounter for insertion of intrauterine contraceptive device 06/17/2020    Encounter for insertion of mirena IUD    Encounter for insertion of intrauterine contraceptive device 02/09/2016    Encounter for IUD insertion    Encounter for insertion of intrauterine contraceptive device 11/16/2015    Encounter for insertion of mirena IUD    Encounter for pregnancy test, result negative 02/09/2016    Pregnancy test negative    Encounter for screening for infections with a predominantly sexual mode of transmission 02/09/2016    Screen for STD (sexually transmitted disease)    Inadequate sleep hygiene 04/15/2021    History of difficulty sleeping    Low back pain, unspecified 06/15/2017    Acute low back pain    Morbid (severe) obesity due to excess calories (CMS/[facility]) 07/06/2020    Class 3 severe obesity due to excess calories with serious comorbidity and body mass index (BMI) of 40.0 to 44.9 in adult    Nontoxic goiter, unspecified 04/18/2021    Goiter    Other specified anxiety disorders 11/09/2015    Depression with anxiety    Other specified disorders of eustachian tube, right ear 08/08/2019    Dysfunction of right eustachian tube    Papillomavirus as the cause of diseases classified elsewhere 09/21/2017    HPV in female    Personal history of diseases of the skin and subcutaneous tissue 08/09/2019    History of acne    Personal history of diseases of the skin and subcutaneous tissue 02/26/2016    History  of cellulitis    Personal history of diseases of the skin and subcutaneous tissue 02/26/2016    History of skin pruritus    Personal history of other diseases of the female genital tract 01/06/2017    History of dysmenorrhea    Personal history of other diseases of the female genital tract 12/07/2015    History of irregular menstrual cycles    Personal history of other diseases of the female genital tract 11/09/2015    History of vaginitis    Personal history of other diseases of the female genital tract 10/11/2018    History of irregular menstrual cycles    Personal history of other diseases of the respiratory system 04/28/2016    History of acute sinusitis    Personal history of other diseases of the respiratory system 04/28/2016    History of upper respiratory infection    Personal history of other diseases of the respiratory system 10/08/2021    History of acute sinusitis    Personal history of other endocrine, nutritional and metabolic disease 12/07/2015    History of hyperprolactinemia    Personal history of other infectious and parasitic diseases 10/14/2021    History of candidiasis of vagina    Personal history of other specified conditions 11/16/2015    History of urinary frequency    Persons encountering health services in other specified circumstances 08/09/2019    Establishing care with new doctor, encounter for    Right upper quadrant pain 04/28/2016    Right upper quadrant abdominal pain    Somnolence 04/15/2021    Daytime sleepiness    Strain of muscle, fascia and tendon of lower back, initial encounter 10/10/2016    Lumbar strain     Past Surgical History:   Procedure Laterality Date    OTHER SURGICAL HISTORY  01/03/2022    Sleeve gastrectomy    TONSILLECTOMY  11/02/2015    Tonsillectomy With Adenoidectomy     Family History   Problem Relation Name Age of Onset    Other (Diabetes mellitus) Mother      No Known Problems Father      Prostate cancer Father's Brother      Hypertension Maternal  Grandmother      Esophageal cancer Paternal Grandfather       Social History     Tobacco Use    Smoking status: Never    Smokeless tobacco: Never   Vaping Use    Vaping Use: Never used   Substance Use Topics    Alcohol use: Never    Drug use: Never       Physical Exam   ED Triage Vitals   Temp Heart Rate Resp BP   12/04/23 1253 12/04/23 1253 12/04/23 1253 12/04/23 1253   36.7 °C (98 °F) 74 18 (!) 139/94      SpO2 Temp Source Heart Rate Source Patient Position   12/04/23 1525 12/04/23 1253 12/04/23 1525 12/04/23 1525   100 % Oral Monitor Sitting      BP Location FiO2 (%)     12/04/23 1525 --     Left arm        Physical Exam    ED Course & MDM   Diagnoses as of 12/04/23 2246   SDH (subdural hematoma) (CMS/[facility])       Medical Decision Making      Procedure  Procedures     Nohelia Barnett MD  12/04/23 8145

## 2023-12-04 NOTE — ED TRIAGE NOTES
Patient fell and hit her head yesterday on hard wood floors. Patient has hx of sludgey GB and was walking off the pain.  Today has HA, neck pain with stiffness and reports unable to chew with out a lot of pain.

## 2023-12-05 ENCOUNTER — HOSPITAL ENCOUNTER (EMERGENCY)
Facility: HOSPITAL | Age: 28
Discharge: HOME | End: 2023-12-05
Attending: EMERGENCY MEDICINE
Payer: COMMERCIAL

## 2023-12-05 ENCOUNTER — APPOINTMENT (OUTPATIENT)
Dept: RADIOLOGY | Facility: HOSPITAL | Age: 28
End: 2023-12-05
Payer: COMMERCIAL

## 2023-12-05 VITALS
DIASTOLIC BLOOD PRESSURE: 58 MMHG | RESPIRATION RATE: 18 BRPM | HEIGHT: 66 IN | WEIGHT: 220 LBS | SYSTOLIC BLOOD PRESSURE: 102 MMHG | TEMPERATURE: 97.8 F | OXYGEN SATURATION: 98 % | HEART RATE: 74 BPM | BODY MASS INDEX: 35.36 KG/M2

## 2023-12-05 DIAGNOSIS — S06.0X0A CONCUSSION WITHOUT LOSS OF CONSCIOUSNESS, INITIAL ENCOUNTER: Primary | ICD-10-CM

## 2023-12-05 LAB
ANION GAP SERPL CALC-SCNC: 11 MMOL/L (ref 10–20)
BASOPHILS # BLD AUTO: 0.05 X10*3/UL (ref 0–0.1)
BASOPHILS NFR BLD AUTO: 0.7 %
BUN SERPL-MCNC: 6 MG/DL (ref 6–23)
CALCIUM SERPL-MCNC: 8.6 MG/DL (ref 8.6–10.3)
CHLORIDE SERPL-SCNC: 103 MMOL/L (ref 98–107)
CO2 SERPL-SCNC: 27 MMOL/L (ref 21–32)
CREAT SERPL-MCNC: 0.61 MG/DL (ref 0.5–1.05)
EOSINOPHIL # BLD AUTO: 0.23 X10*3/UL (ref 0–0.7)
EOSINOPHIL NFR BLD AUTO: 3.3 %
ERYTHROCYTE [DISTWIDTH] IN BLOOD BY AUTOMATED COUNT: 15.3 % (ref 11.5–14.5)
GFR SERPL CREATININE-BSD FRML MDRD: >90 ML/MIN/1.73M*2
GLUCOSE SERPL-MCNC: 106 MG/DL (ref 74–99)
HCT VFR BLD AUTO: 37.4 % (ref 36–46)
HGB BLD-MCNC: 11.8 G/DL (ref 12–16)
IMM GRANULOCYTES # BLD AUTO: 0.02 X10*3/UL (ref 0–0.7)
IMM GRANULOCYTES NFR BLD AUTO: 0.3 % (ref 0–0.9)
INR PPP: 1.1 (ref 0.9–1.1)
LYMPHOCYTES # BLD AUTO: 3.09 X10*3/UL (ref 1.2–4.8)
LYMPHOCYTES NFR BLD AUTO: 44.7 %
MCH RBC QN AUTO: 24.1 PG (ref 26–34)
MCHC RBC AUTO-ENTMCNC: 31.6 G/DL (ref 32–36)
MCV RBC AUTO: 77 FL (ref 80–100)
MONOCYTES # BLD AUTO: 0.57 X10*3/UL (ref 0.1–1)
MONOCYTES NFR BLD AUTO: 8.2 %
NEUTROPHILS # BLD AUTO: 2.95 X10*3/UL (ref 1.2–7.7)
NEUTROPHILS NFR BLD AUTO: 42.8 %
NRBC BLD-RTO: 0 /100 WBCS (ref 0–0)
PLATELET # BLD AUTO: 270 X10*3/UL (ref 150–450)
POTASSIUM SERPL-SCNC: 3.7 MMOL/L (ref 3.5–5.3)
PROTHROMBIN TIME: 12.3 SECONDS (ref 9.8–12.8)
RBC # BLD AUTO: 4.89 X10*6/UL (ref 4–5.2)
SODIUM SERPL-SCNC: 137 MMOL/L (ref 136–145)
WBC # BLD AUTO: 6.9 X10*3/UL (ref 4.4–11.3)

## 2023-12-05 PROCEDURE — 2500000004 HC RX 250 GENERAL PHARMACY W/ HCPCS (ALT 636 FOR OP/ED): Performed by: EMERGENCY MEDICINE

## 2023-12-05 PROCEDURE — 80048 BASIC METABOLIC PNL TOTAL CA: CPT | Performed by: EMERGENCY MEDICINE

## 2023-12-05 PROCEDURE — 85025 COMPLETE CBC W/AUTO DIFF WBC: CPT | Performed by: INTERNAL MEDICINE

## 2023-12-05 PROCEDURE — 36415 COLL VENOUS BLD VENIPUNCTURE: CPT | Performed by: INTERNAL MEDICINE

## 2023-12-05 PROCEDURE — 70450 CT HEAD/BRAIN W/O DYE: CPT

## 2023-12-05 PROCEDURE — 96361 HYDRATE IV INFUSION ADD-ON: CPT

## 2023-12-05 PROCEDURE — 85610 PROTHROMBIN TIME: CPT | Performed by: INTERNAL MEDICINE

## 2023-12-05 PROCEDURE — 70450 CT HEAD/BRAIN W/O DYE: CPT | Performed by: RADIOLOGY

## 2023-12-05 PROCEDURE — 85025 COMPLETE CBC W/AUTO DIFF WBC: CPT | Performed by: EMERGENCY MEDICINE

## 2023-12-05 PROCEDURE — 85610 PROTHROMBIN TIME: CPT | Performed by: EMERGENCY MEDICINE

## 2023-12-05 PROCEDURE — 96374 THER/PROPH/DIAG INJ IV PUSH: CPT

## 2023-12-05 PROCEDURE — 99284 EMERGENCY DEPT VISIT MOD MDM: CPT | Mod: 25 | Performed by: EMERGENCY MEDICINE

## 2023-12-05 PROCEDURE — 80048 BASIC METABOLIC PNL TOTAL CA: CPT | Performed by: INTERNAL MEDICINE

## 2023-12-05 PROCEDURE — 96375 TX/PRO/DX INJ NEW DRUG ADDON: CPT

## 2023-12-05 RX ORDER — ACETAMINOPHEN 325 MG/1
975 TABLET ORAL ONCE
Status: COMPLETED | OUTPATIENT
Start: 2023-12-05 | End: 2023-12-05

## 2023-12-05 RX ORDER — DIPHENHYDRAMINE HYDROCHLORIDE 50 MG/ML
25 INJECTION INTRAMUSCULAR; INTRAVENOUS ONCE
Status: COMPLETED | OUTPATIENT
Start: 2023-12-05 | End: 2023-12-05

## 2023-12-05 RX ORDER — METOCLOPRAMIDE HYDROCHLORIDE 5 MG/ML
10 INJECTION INTRAMUSCULAR; INTRAVENOUS ONCE
Status: COMPLETED | OUTPATIENT
Start: 2023-12-05 | End: 2023-12-05

## 2023-12-05 RX ADMIN — DIPHENHYDRAMINE HYDROCHLORIDE 25 MG: 50 INJECTION, SOLUTION INTRAMUSCULAR; INTRAVENOUS at 21:42

## 2023-12-05 RX ADMIN — ACETAMINOPHEN 975 MG: 325 TABLET ORAL at 21:42

## 2023-12-05 RX ADMIN — METOCLOPRAMIDE 10 MG: 5 INJECTION, SOLUTION INTRAMUSCULAR; INTRAVENOUS at 21:42

## 2023-12-05 RX ADMIN — SODIUM CHLORIDE 500 ML: 9 INJECTION, SOLUTION INTRAVENOUS at 21:42

## 2023-12-05 ASSESSMENT — COLUMBIA-SUICIDE SEVERITY RATING SCALE - C-SSRS
2. HAVE YOU ACTUALLY HAD ANY THOUGHTS OF KILLING YOURSELF?: NO
6. HAVE YOU EVER DONE ANYTHING, STARTED TO DO ANYTHING, OR PREPARED TO DO ANYTHING TO END YOUR LIFE?: NO
1. IN THE PAST MONTH, HAVE YOU WISHED YOU WERE DEAD OR WISHED YOU COULD GO TO SLEEP AND NOT WAKE UP?: NO

## 2023-12-05 ASSESSMENT — PAIN - FUNCTIONAL ASSESSMENT: PAIN_FUNCTIONAL_ASSESSMENT: 0-10

## 2023-12-05 ASSESSMENT — PAIN SCALES - GENERAL: PAINLEVEL_OUTOF10: 8

## 2023-12-05 NOTE — DISCHARGE INSTRUCTIONS
You may take Tylenol for pain or discomfort.  Follow-up with your primary care doctor.  Follow-up with GI as previously planned.  Return immediately if concerning symptoms, as discussed.

## 2023-12-05 NOTE — ED TRIAGE NOTES
" TRIAGE NOTE   I saw the patient as the Clinician in Triage and performed a brief history and physical exam, established acuity, and ordered appropriate tests to develop basic plan of care. Patient will be seen by an [facility], resident and/or physician who will independently evaluate the patient. Please see subsequent provider notes for further details and disposition.     Brief HPI: In brief, Cecelia Phillips is a 28 y.o. female that presents for headache.  Patient states yesterday she diagnosis subdural hematoma.  States that since yesterday her headache is worsened.  Patient states she also has a little bit of blurry vision.  She denies any fever/chills.  States that she did have 2 episodes of nonbloody nonbilious vomiting today.  No other symptoms or concerns at this time.  Patient states \"I would not be sitting in this lobby much longer.  They put me in a bain bed yesterday and I will not be in a bain bed today\".    Focused Physical exam:   EOMI without nystagmus.  PERRL.    Plan/MDM:   Initiating CBC, BMP, INR.  Will leave imaging up to the discretion of the provider in the back of the ED.  I will not be following with this patient during her ED visit.  Patient will be seen in the back of the ED for further evaluation and treatment.    Please see subsequent provider note for further details and disposition   "

## 2023-12-06 ENCOUNTER — APPOINTMENT (OUTPATIENT)
Dept: PRIMARY CARE | Facility: CLINIC | Age: 28
End: 2023-12-06
Payer: COMMERCIAL

## 2023-12-06 ENCOUNTER — OFFICE VISIT (OUTPATIENT)
Dept: PRIMARY CARE | Facility: CLINIC | Age: 28
End: 2023-12-06
Payer: COMMERCIAL

## 2023-12-06 VITALS
HEART RATE: 74 BPM | TEMPERATURE: 97.2 F | HEIGHT: 66 IN | DIASTOLIC BLOOD PRESSURE: 60 MMHG | WEIGHT: 226 LBS | BODY MASS INDEX: 36.32 KG/M2 | OXYGEN SATURATION: 95 % | SYSTOLIC BLOOD PRESSURE: 100 MMHG

## 2023-12-06 DIAGNOSIS — S06.5XAA SDH (SUBDURAL HEMATOMA) (MULTI): Primary | ICD-10-CM

## 2023-12-06 PROCEDURE — 99213 OFFICE O/P EST LOW 20 MIN: CPT | Performed by: INTERNAL MEDICINE

## 2023-12-06 PROCEDURE — 3008F BODY MASS INDEX DOCD: CPT | Performed by: INTERNAL MEDICINE

## 2023-12-06 PROCEDURE — 1036F TOBACCO NON-USER: CPT | Performed by: INTERNAL MEDICINE

## 2023-12-06 ASSESSMENT — ENCOUNTER SYMPTOMS
TREMORS: 0
SINUS PAIN: 0
DYSURIA: 0
JOINT SWELLING: 0
NECK PAIN: 0
CONSTIPATION: 0
ADENOPATHY: 0
OCCASIONAL FEELINGS OF UNSTEADINESS: 0
FEVER: 0
PALPITATIONS: 0
POLYDIPSIA: 0
WOUND: 0
ABDOMINAL PAIN: 0
FACIAL ASYMMETRY: 0
APPETITE CHANGE: 0
SEIZURES: 0
CONFUSION: 0
VOMITING: 1
DEPRESSION: 0
PHOTOPHOBIA: 0
NERVOUS/ANXIOUS: 0
ARTHRALGIAS: 0
LOSS OF SENSATION IN FEET: 0
POLYPHAGIA: 0
SPEECH DIFFICULTY: 0
COUGH: 0
FLANK PAIN: 0
ACTIVITY CHANGE: 0
MYALGIAS: 0
TROUBLE SWALLOWING: 0
CHEST TIGHTNESS: 0
SHORTNESS OF BREATH: 0
STRIDOR: 0
NAUSEA: 1
HEADACHES: 1
HEMATURIA: 0
WEAKNESS: 0
FATIGUE: 1
COLOR CHANGE: 0
DIARRHEA: 0
BLOOD IN STOOL: 0
DIZZINESS: 1
NUMBNESS: 1
EYE PAIN: 0
VOICE CHANGE: 0
SORE THROAT: 0
HALLUCINATIONS: 0
BACK PAIN: 0
UNEXPECTED WEIGHT CHANGE: 0
WHEEZING: 0
SLEEP DISTURBANCE: 0

## 2023-12-06 ASSESSMENT — PAIN SCALES - GENERAL: PAINLEVEL: 9

## 2023-12-06 ASSESSMENT — PATIENT HEALTH QUESTIONNAIRE - PHQ9
1. LITTLE INTEREST OR PLEASURE IN DOING THINGS: NOT AT ALL
2. FEELING DOWN, DEPRESSED OR HOPELESS: NOT AT ALL
SUM OF ALL RESPONSES TO PHQ9 QUESTIONS 1 AND 2: 0

## 2023-12-06 NOTE — ED PROVIDER NOTES
"HPI   Chief Complaint   Patient presents with    Head Injury       HPI  Patient is a 28-year-old female with a past medical history significant for nephrolithiasis, chronic abdominal pain with a recent ultrasound of the right upper quadrant showing gallbladder sludge, chronic nausea vomiting and who was seen in the emergency room yesterday after head trauma the day before resulting in a small subdural hemorrhage who presents emergency room with a chief complaint of headache.  Patient states that after discharge yesterday she went home and took some Tylenol for headache.  She was able to sleep overnight but today felt a dull headache all over and some associated bilateral \"fogginess\" over her vision.  It has greatly improved but is still present to some degree.  She denies any diplopia or visual field cuts.  She has tingling to the tips of her fingers and bilateral upper extremities but no other sensory deficits, focal weakness.  She has no slurred speech.  She had an episode of emesis today but her abdominal pain was not significant like it usually is.      PMHx: As above  PSHx: Denies  FamilyHx: Denies pertinent  SocialHx: Denies  Allergies: NKDA  Medications: See Medication Reconciliation     ROS  As above but otherwise denies    Physical Exam    GENERAL: Awake and Alert, No Acute Distress  HEENT: AT/NC, PERRL, EOMI, Normal Oropharynx, No Signs of Dehydration  NECK: Normal Inspection, No JVD  CARDIOVASCULAR: RRR, No M/R/G  RESPIRATORY: CTA Bilaterally, No Wheezes, Rales or Rhonchi, Chest Wall Non-tender  ABDOMEN: Soft, non-tender abdomen, Normal Bowel Sounds, No Distention  BACK: No CVA Tenderness  SKIN: Normal Color, Warm, Dry, No Rashes   EXTREMITIES: Non-Tender, Full ROM, No Pedal Edema  NEURO: A&O x 3, Normal Motor and Sensation, tearful mood and Affect    Nursing Assessment and Vitals Reviewed    Medical Decision  Patient seen and evaluated for headache which is dull, has been ongoing since hitting her head " on Sunday.  It has not worsened but just persisting.  She can only take Tylenol owing to gastric bypass surgery as she cannot take NSAIDs.  She describes some blurriness in her vision bilaterally which is improved but still present but has no visual field cuts, diplopia, focal weakness, objective numbness in her extremities.  She does feel some tingling to bilateral fingertips.  She is ambulatory and otherwise well-appearing and in no acute distress without meningeal signs.  Patient is due for Tylenol and will add Reglan, Benadryl and IV fluids to treat as a migraine.  She had 2 CTs yesterday that showed a stable very small subdural.  After discussion with neurosurgery she was discharged home as she did not require any acute intervention.  She remains neurologically intact at this time and I do not feel she would benefit from any additional imaging thus far.  Will continue to reassess.    Work-up for patient today included labs that revealed slight improvement in her hemoglobin which was 11.8, microcytic hypochromic.  Stable compared to prior.  Repeat CT showed redemonstration of subtle fusiform region of increased density along the left side of the posterior falx cerebri which may represent a subtle subdural hematoma.  Patient was discussed with neurosurgery  Dr Landon who, given that patient feels improved after medications for headache and remains neurologically intact, agrees that patient can be discharged home.  I had an extensive discussion with patient regarding supportive care at home, the importance of follow-up with her primary care doctor.  As she feels better she still wants to go home.  I will give her referral for neurology as well in case she continues to have recurrence of the same kind of headache.  She is, however, thoroughly educated on signs and symptoms that should prompt immediate turn to emergency room.                                  [place] Coma Scale Score: 15         NIH Stroke Scale: 0           Patient History   Past Medical History:   Diagnosis Date    Acute streptococcal tonsillitis, unspecified 09/04/2019    Strep tonsillitis    Encounter for contraceptive management, unspecified 05/20/2020    Contraception management    Encounter for insertion of intrauterine contraceptive device 06/17/2020    Encounter for insertion of mirena IUD    Encounter for insertion of intrauterine contraceptive device 02/09/2016    Encounter for IUD insertion    Encounter for insertion of intrauterine contraceptive device 11/16/2015    Encounter for insertion of mirena IUD    Encounter for pregnancy test, result negative 02/09/2016    Pregnancy test negative    Encounter for screening for infections with a predominantly sexual mode of transmission 02/09/2016    Screen for STD (sexually transmitted disease)    Inadequate sleep hygiene 04/15/2021    History of difficulty sleeping    Low back pain, unspecified 06/15/2017    Acute low back pain    Morbid (severe) obesity due to excess calories (CMS/[facility]) 07/06/2020    Class 3 severe obesity due to excess calories with serious comorbidity and body mass index (BMI) of 40.0 to 44.9 in adult    Nontoxic goiter, unspecified 04/18/2021    Goiter    Other specified anxiety disorders 11/09/2015    Depression with anxiety    Other specified disorders of eustachian tube, right ear 08/08/2019    Dysfunction of right eustachian tube    Papillomavirus as the cause of diseases classified elsewhere 09/21/2017    HPV in female    Personal history of diseases of the skin and subcutaneous tissue 08/09/2019    History of acne    Personal history of diseases of the skin and subcutaneous tissue 02/26/2016    History of cellulitis    Personal history of diseases of the skin and subcutaneous tissue 02/26/2016    History of skin pruritus    Personal history of other diseases of the female genital tract 01/06/2017    History of dysmenorrhea    Personal history of other diseases of the female genital  tract 12/07/2015    History of irregular menstrual cycles    Personal history of other diseases of the female genital tract 11/09/2015    History of vaginitis    Personal history of other diseases of the female genital tract 10/11/2018    History of irregular menstrual cycles    Personal history of other diseases of the respiratory system 04/28/2016    History of acute sinusitis    Personal history of other diseases of the respiratory system 04/28/2016    History of upper respiratory infection    Personal history of other diseases of the respiratory system 10/08/2021    History of acute sinusitis    Personal history of other endocrine, nutritional and metabolic disease 12/07/2015    History of hyperprolactinemia    Personal history of other infectious and parasitic diseases 10/14/2021    History of candidiasis of vagina    Personal history of other specified conditions 11/16/2015    History of urinary frequency    Persons encountering health services in other specified circumstances 08/09/2019    Establishing care with new doctor, encounter for    Right upper quadrant pain 04/28/2016    Right upper quadrant abdominal pain    Somnolence 04/15/2021    Daytime sleepiness    Strain of muscle, fascia and tendon of lower back, initial encounter 10/10/2016    Lumbar strain     Past Surgical History:   Procedure Laterality Date    OTHER SURGICAL HISTORY  01/03/2022    Sleeve gastrectomy    TONSILLECTOMY  11/02/2015    Tonsillectomy With Adenoidectomy     Family History   Problem Relation Name Age of Onset    Other (Diabetes mellitus) Mother      No Known Problems Father      Prostate cancer Father's Brother      Hypertension Maternal Grandmother      Esophageal cancer Paternal Grandfather       Social History     Tobacco Use    Smoking status: Never    Smokeless tobacco: Never   Vaping Use    Vaping Use: Never used   Substance Use Topics    Alcohol use: Never    Drug use: Never       Physical Exam   ED Triage Vitals  [12/05/23 1244]   Temp Heart Rate Resp BP   36.6 °C (97.8 °F) 73 15 118/89      SpO2 Temp src Heart Rate Source Patient Position   100 % -- -- --      BP Location FiO2 (%)     -- --       Physical Exam    ED Course & MDM        Medical Decision Making      Procedure  Procedures     Nohelia Barnett MD  12/05/23 8643

## 2023-12-06 NOTE — PROGRESS NOTES
Subjective   Patient ID: Cecelia Phillips is a 28 y.o. female who presents for Concussion (Fell Saturday, went to [facility] ER 2 days later. C/o dizziness, headache, nausea, vomiting, double vision. States numbness and tingling in fingers started tuesda).    Concussion   Associated symptoms include headaches, numbness and vomiting. Pertinent negatives include no weakness.   Patient recently had a fall where she caught her shoes in the rug and fell down striking her head and developed symptoms of headache, nausea and developed double vision following day and she went to ER and CT head was done. Patient found to has small SDH. There was no LOC and no bleeding from ENT report. She had repeat CT scan of the brain as well with no worsening but asked to follow up with PCP and she presented today for follow up.    Patient symptoms are mildly better but still present. Vomiting has stopped. Photophobia is also better. Diplopia resolved.  Severity of headache has improved.    Review of Systems   Constitutional:  Positive for fatigue. Negative for activity change, appetite change, fever and unexpected weight change.   HENT:  Negative for dental problem, ear discharge, hearing loss, nosebleeds, postnasal drip, sinus pain, sore throat, trouble swallowing and voice change.    Eyes:  Positive for visual disturbance. Negative for photophobia and pain.   Respiratory:  Negative for cough, chest tightness, shortness of breath, wheezing and stridor.    Cardiovascular:  Negative for chest pain, palpitations and leg swelling.   Gastrointestinal:  Positive for nausea and vomiting. Negative for abdominal pain, blood in stool, constipation and diarrhea.   Endocrine: Negative for polydipsia, polyphagia and polyuria.   Genitourinary:  Negative for decreased urine volume, dyspareunia, dysuria, flank pain, hematuria and urgency.   Musculoskeletal:  Negative for arthralgias, back pain, gait problem, joint swelling, myalgias and neck pain.   Skin:   "Negative for color change, rash and wound.   Allergic/Immunologic: Negative for environmental allergies and food allergies.   Neurological:  Positive for dizziness, numbness and headaches. Negative for tremors, seizures, syncope, facial asymmetry, speech difficulty and weakness.   Hematological:  Negative for adenopathy.   Psychiatric/Behavioral:  Negative for behavioral problems, confusion, hallucinations, self-injury, sleep disturbance and suicidal ideas. The patient is not nervous/anxious.      Objective   /60   Pulse 74   Temp 36.2 °C (97.2 °F)   Ht 1.676 m (5' 6\")   Wt 103 kg (226 lb)   SpO2 95%   BMI 36.48 kg/m²     Physical Exam  Constitutional:       General: She is not in acute distress.     Appearance: Normal appearance. She is not ill-appearing or toxic-appearing.   HENT:      Head: Normocephalic and atraumatic.      Nose: Nose normal.   Eyes:      General:         Right eye: No discharge.         Left eye: No discharge.      Extraocular Movements: Extraocular movements intact.      Conjunctiva/sclera: Conjunctivae normal.      Pupils: Pupils are equal, round, and reactive to light.   Cardiovascular:      Rate and Rhythm: Normal rate and regular rhythm.      Pulses: Normal pulses.      Heart sounds: Normal heart sounds. No murmur heard.     No gallop.   Pulmonary:      Effort: Pulmonary effort is normal. No respiratory distress.      Breath sounds: Normal breath sounds. No stridor. No wheezing or rales.   Abdominal:      General: Bowel sounds are normal.      Palpations: Abdomen is soft.      Tenderness: There is no abdominal tenderness. There is no right CVA tenderness or left CVA tenderness.   Musculoskeletal:         General: No swelling or deformity. Normal range of motion.      Cervical back: Normal range of motion and neck supple. No rigidity or tenderness.      Right lower leg: No edema.      Left lower leg: No edema.   Skin:     General: Skin is warm.      Coloration: Skin is not " jaundiced.      Findings: No bruising, erythema or rash.   Neurological:      General: No focal deficit present.      Mental Status: She is alert and oriented to person, place, and time.      Cranial Nerves: No cranial nerve deficit.      Coordination: Coordination normal.      Gait: Gait normal.      Deep Tendon Reflexes: Reflexes normal.   Psychiatric:         Mood and Affect: Mood normal.         Behavior: Behavior normal.         Thought Content: Thought content normal.         Judgment: Judgment normal.       Assessment/Plan   Problem List Items Addressed This Visit          Neuro    SDH (subdural hematoma) (CMS/HCC) - Primary     Patient neurological exam is normal.   Patient has been discussed in detail about symptoms which can be indication of worsening of SDH. She has been discussed that if she experiences any symptoms discussed than immediately go to ER.

## 2023-12-06 NOTE — DISCHARGE INSTRUCTIONS
You may take Tylenol for pain or discomfort.  Follow-up with your primary care doctor.  You have been provided a referral to neurology for follow-up should you choose.  Return immediately if ongoing symptoms or worsening symptoms, as discussed.

## 2023-12-06 NOTE — ASSESSMENT & PLAN NOTE
Patient neurological exam is normal.   Patient has been discussed in detail about symptoms which can be indication of worsening of SDH. She has been discussed that if she experiences any symptoms discussed than immediately go to ER.

## 2023-12-14 NOTE — PROGRESS NOTES
[facility]  FOLLOW UP NOTE      Name: Cecelia Phillips  MRN: [id number]      Index Surgery  Date of Surgery: 6/7/2023   Surgeon: Dr. Gan    Surgical Procedure: Revision: sleeve to bypass 85743    HPI: 28-year old female presenting for follow up visit per JORGE Vazquez-CNP to review results of HIDA scan performed on 12/20/23 to determine if cholecystectomy is indicated.   Patient is 6 months s/p revision sleeve to gastric bypass on 6/7/23. Had EGD with dilation GJ anastamosis 8/2023.   Had recent EGD 11/20/23 - no pathology or acute issues.    Had recent RUQ US with sludge, no acute inflammation or stones present.       Preop weight in June 270s   Now 219 Lbs     Diet: Has continued to follow soft/blands diet.    Meeting protein and fluid requirements with assistance of shakes.  Able to do smoothies and pureed.  Sometimes pureed/soft foods trigger nausea & vomiting.        Concerns related to:  Nausea/Vomiting, Reflux: + see above  Abdominal Pain: Has noted increased post prandial RUQ in past 1-2 weeks.     Diarrhea/Constipation Has struggled with constipation historically     DAILY SUPPLEMENTS:  Calcium: Calcium Citrate w/ vitamin D (1200 - 1500mg)  Multivitamin & Minerals: 2 per day  Vitamin B12: 500 mcg/day   Vitamin D3: 3000 units  Iron/Other: iron daily  PPI:Nexium once daily                 Current Outpatient Medications   Medication Sig Dispense Refill    calcium carbonate-vitamin D3 1,000 mg-20 mcg (800 unit) tablet Take by mouth.      ergocalciferol (Vitamin D-2) 1.25 MG (21500 UT) capsule Take 1 capsule twice weekly x 8 weeks then start Vitamin D3 2000 units daily over the counter      esomeprazole (NexIUM Packet) 40 mg packet Take 40 mg by mouth once daily in the morning. Take before meals. Mix packet in 10mL water and take twice daily 60 packet 3    ferrous gluconate 236 mg (27 mg iron) tablet Take by mouth.      hydrOXYzine HCL (Atarax) 25 mg tablet Take 1 tablet (25 mg) by mouth once  daily at bedtime. 90 tablet 1    levonorgestrel (Mirena) 21 mcg/24 hours (8 yrs) 52 mg IUD by intrauterine route.      multivitamin-min-iron-FA-vit K (Adults Multivitamin) 18 mg iron-400 mcg-25 mcg tablet Take by mouth.      omeprazole (PriLOSEC) 40 mg DR capsule Take by mouth once daily.      ondansetron ODT (Zofran-ODT) 4 mg disintegrating tablet Take 1-2 tablets (4-8 mg) by mouth every 8 hours if needed for nausea or vomiting. 30 tablet 3    promethazine (Phenergan) 12.5 mg tablet Take 1 tablet (12.5 mg) by mouth every 6 hours if needed for nausea.      ursodiol (ANGELO 250) 250 mg tablet Take 1 tablet (250 mg) by mouth 2 times a day. 60 tablet 2    Vitamin B-1 100 mg tablet Take 0.5 tablets (50 mg) by mouth once daily.       No current facility-administered medications for this visit.       Comorbidities:  Patient Active Problem List   Diagnosis    Acne    Allergies    Anxiety and depression    Attention deficit    Goiter    Gastroesophageal reflux disease    Hiatal hernia with gastroesophageal reflux    History of bariatric surgery    IUD contraception    Migraine without aura and responsive to treatment    Moderate episode of recurrent major depressive disorder (CMS/HCC)    PCOS (polycystic ovarian syndrome)    Vitamin D deficiency    Candidiasis of vagina    Constipation, acute    Dehydration    Iron deficiency anemia    Nausea in adult    Post-op pain    Post-operative nausea and vomiting    Postoperative malabsorption    Psychological factors affecting medical condition    S/P gastric bypass    Dysphagia    Gallbladder sludge    SDH (subdural hematoma) (CMS/HCC)         REVIEW OF SYSTEMS:  CONSTITUTIONAL: Patient denies fevers, chills, sweats and weight changes.  CARDIOVASCULAR: Patient denies chest pains, palpitations, orthopnea and paroxysmal nocturnal dyspnea.  RESPIRATORY: No dyspnea on exertion, no wheezing or cough.  GI: No nausea, vomiting, diarrhea, constipation, abdominal pain, hematochezia or  melena.  MUSCULOSKELETAL: No myalgias or arthralgias.  NEUROLOGIC: No chronic headaches, no seizures. Patient denies numbness, tingling or weakness.  PSYCHIATRIC: Patient denies problems with mood disturbance. No problems with anxiety.  ENDOCRINE: No excessive urination or excessive thirst.  DERMATOLOGIC: Patient denies any rashes or skin changes.    PHYSICAL EXAM:  There were no vitals taken for this visit.  Alert, well appearing, no acute distress, nourished, hydrated.  Anicteric sclera, no ptosis  Facial symmetry  Neck supple  Unlabored respirations.  Easily conversant without increased respiratory effort  Oriented to person, place, time.  Judgement intact.  Appropriate mood, affect.       ASSESSMENT & PLAN:  28 y.o. female presenting for follow up visit s/p sleeve to gastric bypass conversion in June 2023      Patient has been having nausea, vomiting issues postoperatively, underwent EGD with dilation 2 months after the surgery.  Symptoms still continued and she underwent another endoscopy in September however her gastrojejunostomy was widely open and there was no stricture or any other mechanical issues noted.    Episodes continued and we obtained a right upper quadrant ultrasound which was consistent with gallbladder sludge but no sign of cholecystitis.    A HIDA scan was obtained which showed biliary dyskinesia with low ejection fraction around 17%.    She continues to have nausea and occasional emesis especially after eating chicken or solid food.  She is still doing protein shakes to meet her protein goals, takes her vitamins regularly.  She has lost close to 50 pounds since surgery.    Bowel movements are regular.    Pain is mostly in the right upper quadrant area and intermittent only.          Weight Loss: Initial  -> Current   Wt Readings from Last 1 Encounters:   12/06/23 103 kg (226 lb)       Plan:  -Based on the findings of the HIDA scan and ultrasound and continue his symptoms a cholecystectomy is  indicated.  Will perform a diagnostic laparoscopy also and address any adhesions or if there are any other issues and due to ongoing symptoms of nausea vomiting we will perform an intraoperative endoscopy also.    Risks, benefits, alternatives of laparoscopic possible open cholecystectomy explained to patient,questions answered. Risks including and not limited to bleeding, infection, bile leak or bile duct injury and its sequelae,DVT,PE, lung complications, chronic pain, chronic diarrhea ; explained to patient. Patient voiced understanding and wants to proceed with planned surgery.    Advised the patient to continue nausea medication, multivitamin supplementation and PPI.      Will schedule for laparoscopic cholecystectomy, EGD on elective basis.

## 2023-12-18 DIAGNOSIS — F32.A DEPRESSION, UNSPECIFIED: ICD-10-CM

## 2023-12-18 DIAGNOSIS — F41.9 ANXIETY DISORDER, UNSPECIFIED: ICD-10-CM

## 2023-12-18 RX ORDER — HYDROXYZINE HYDROCHLORIDE 25 MG/1
25 TABLET, FILM COATED ORAL NIGHTLY
Qty: 90 TABLET | Refills: 1 | Status: SHIPPED | OUTPATIENT
Start: 2023-12-18 | End: 2024-04-23 | Stop reason: ALTCHOICE

## 2023-12-19 ENCOUNTER — TELEPHONE (OUTPATIENT)
Dept: SURGERY | Facility: CLINIC | Age: 28
End: 2023-12-19
Payer: COMMERCIAL

## 2023-12-19 NOTE — TELEPHONE ENCOUNTER
Left message for patient letting her know we had to move her visit with Dr. Gan from 10:15 to 11:30 on Friday due to a change in the schedule.  Asked patient to call back if this does not work

## 2023-12-20 ENCOUNTER — HOSPITAL ENCOUNTER (OUTPATIENT)
Dept: RADIOLOGY | Facility: HOSPITAL | Age: 28
Discharge: HOME | End: 2023-12-20
Payer: COMMERCIAL

## 2023-12-20 DIAGNOSIS — Z98.84 S/P GASTRIC BYPASS: ICD-10-CM

## 2023-12-20 DIAGNOSIS — R11.0 NAUSEA IN ADULT: ICD-10-CM

## 2023-12-20 DIAGNOSIS — K82.8 GALLBLADDER SLUDGE: ICD-10-CM

## 2023-12-20 PROCEDURE — 3430000001 HC RX 343 DIAGNOSTIC RADIOPHARMACEUTICALS: Performed by: NURSE PRACTITIONER

## 2023-12-20 PROCEDURE — 78227 HEPATOBIL SYST IMAGE W/DRUG: CPT

## 2023-12-20 PROCEDURE — 78227 HEPATOBIL SYST IMAGE W/DRUG: CPT | Performed by: RADIOLOGY

## 2023-12-20 PROCEDURE — A9537 TC99M MEBROFENIN: HCPCS | Performed by: NURSE PRACTITIONER

## 2023-12-20 RX ORDER — KIT FOR THE PREPARATION OF TECHNETIUM TC 99M MEBROFENIN 45 MG/10ML
5.5 INJECTION, POWDER, LYOPHILIZED, FOR SOLUTION INTRAVENOUS
Status: COMPLETED | OUTPATIENT
Start: 2023-12-20 | End: 2023-12-20

## 2023-12-20 RX ADMIN — KIT FOR THE PREPARATION OF TECHNETIUM TC 99M MEBROFENIN 5.5 MILLICURIE: 45 INJECTION, POWDER, LYOPHILIZED, FOR SOLUTION INTRAVENOUS at 09:24

## 2023-12-22 ENCOUNTER — TELEMEDICINE (OUTPATIENT)
Dept: SURGERY | Facility: CLINIC | Age: 28
End: 2023-12-22
Payer: COMMERCIAL

## 2023-12-22 DIAGNOSIS — K81.1 CHRONIC CHOLECYSTITIS: Primary | ICD-10-CM

## 2023-12-22 DIAGNOSIS — Z98.84 HISTORY OF GASTRIC BYPASS: ICD-10-CM

## 2023-12-22 DIAGNOSIS — K80.50 BILIARY COLIC: Primary | ICD-10-CM

## 2023-12-22 DIAGNOSIS — K82.8 BILIARY DYSKINESIA: ICD-10-CM

## 2023-12-22 DIAGNOSIS — R11.2 NAUSEA AND VOMITING, UNSPECIFIED VOMITING TYPE: ICD-10-CM

## 2023-12-22 PROCEDURE — 99213 OFFICE O/P EST LOW 20 MIN: CPT | Mod: 95 | Performed by: SURGERY

## 2023-12-22 PROCEDURE — 99213 OFFICE O/P EST LOW 20 MIN: CPT | Performed by: SURGERY

## 2024-01-04 ENCOUNTER — APPOINTMENT (OUTPATIENT)
Dept: SURGERY | Facility: CLINIC | Age: 29
End: 2024-01-04
Payer: COMMERCIAL

## 2024-01-18 ENCOUNTER — APPOINTMENT (OUTPATIENT)
Dept: SURGERY | Facility: CLINIC | Age: 29
End: 2024-01-18
Payer: COMMERCIAL

## 2024-02-08 ENCOUNTER — APPOINTMENT (OUTPATIENT)
Dept: SURGERY | Facility: CLINIC | Age: 29
End: 2024-02-08
Payer: COMMERCIAL

## 2024-04-01 ENCOUNTER — OFFICE VISIT (OUTPATIENT)
Dept: OBSTETRICS AND GYNECOLOGY | Facility: CLINIC | Age: 29
End: 2024-04-01
Payer: COMMERCIAL

## 2024-04-01 VITALS
DIASTOLIC BLOOD PRESSURE: 77 MMHG | WEIGHT: 226 LBS | HEIGHT: 66 IN | BODY MASS INDEX: 36.32 KG/M2 | SYSTOLIC BLOOD PRESSURE: 111 MMHG

## 2024-04-01 DIAGNOSIS — Z01.419 ENCOUNTER FOR GYNECOLOGICAL EXAMINATION WITHOUT ABNORMAL FINDING: Primary | ICD-10-CM

## 2024-04-01 PROCEDURE — 99395 PREV VISIT EST AGE 18-39: CPT | Performed by: OBSTETRICS & GYNECOLOGY

## 2024-04-01 PROCEDURE — 87661 TRICHOMONAS VAGINALIS AMPLIF: CPT

## 2024-04-01 PROCEDURE — 87800 DETECT AGNT MULT DNA DIREC: CPT

## 2024-04-01 PROCEDURE — 3008F BODY MASS INDEX DOCD: CPT | Performed by: OBSTETRICS & GYNECOLOGY

## 2024-04-01 PROCEDURE — 88175 CYTOPATH C/V AUTO FLUID REDO: CPT

## 2024-04-01 NOTE — PROGRESS NOTES
"Subjective   Cecelia Phillips is a 29 y.o. female G0 here for a routine exam.  This is a new patient to this practice.  She has been seen with the  system and had a Mirena IUD inserted with Dr. Leon on March 23, 2023.  She has no menses, there is occasional spotting.    No pelvic pain, no dysuria, no change in bowel habits, they occasionally are loose.  She had a gastric sleeve to Sergio-en-Y revision June 7, 2023 and does also have gallbladder \"sludge\".  She has no dysuria.  No fevers or chills.    She works at , on [employer] 3/5th floors.  Personal health questionnaire reviewed: yes.     Gynecologic History  No LMP recorded (lmp unknown). Patient has had an implant.  Contraception: IUD  Last Pap: 5/2020. Results were: normal  Last mammogram: n/a. Results were: normal    Obstetric History  OB History   No obstetric history on file.       Objective   Constitutional: Alert and in no acute distress. Well developed, well nourished.   Head and Face: Head and face: Normal.    Eyes: Normal external exam - nonicteric sclera, extraocular movements intact (EOMI) and no ptosis.   Neck: No neck asymmetry. Supple. Thyroid not enlarged and there were no palpable thyroid nodules.    Pulmonary: No respiratory distress.   Chest: Breasts: Normal appearance, no nipple discharge and no skin changes. Palpation of breasts and axillae: No palpable mass and no axillary lymphadenopathy.   Abdomen: Soft nontender; no abdominal mass palpated. No organomegaly. No hernias.   Genitourinary: External genitalia: Normal. No inguinal lymphadenopathy. Bartholin's Urethral and Skenes Glands: Normal. Urethra: Normal.  Bladder: Normal on palpation. Vagina: Normal. Cervix: Normal.  The cervix is poorly visualized due to vaginal redundancy.  Uterus: Normal.  Right Adnexa/parametria: Normal.  Left Adnexa/parametria: Normal.  Habitus limits exam. inspection of Perianal Area: Normal.   Musculoskeletal: No joint swelling seen, normal movements of all " extremities.   Skin: Normal skin color and pigmentation, normal skin turgor, and no rash.   Neurologic: Non-focal. Grossly intact.   Psychiatric: Alert and oriented x 3. Affect normal to patient baseline. Mood: Appropriate.  Physical Exam     Assessment/Plan   Healthy female exam.  This is a 29-year-old  0 with a normal annual exam.  A Pap smear was sent, including cultures for chlamydia, gonorrhea and trichomonas.  The strings are not visible currently due to vaginal redundancy, but the amenorrhea is consistent with an IUD in the proper location.  It was inserted in 2023 and will  in 2031.  I will see routinely in 1 year.  Contraception: IUD.

## 2024-04-03 LAB
C TRACH RRNA SPEC QL NAA+PROBE: NEGATIVE
N GONORRHOEA DNA SPEC QL PROBE+SIG AMP: NEGATIVE
T VAGINALIS RRNA SPEC QL NAA+PROBE: NEGATIVE

## 2024-04-08 RX ORDER — OXYCODONE HCL 5 MG/5 ML
5 SOLUTION, ORAL ORAL EVERY 6 HOURS PRN
Qty: 140 ML | Refills: 0 | Status: SHIPPED | OUTPATIENT
Start: 2024-04-08 | End: 2024-04-15

## 2024-04-08 NOTE — PATIENT INSTRUCTIONS
You are scheduled for  Cholecystectomy with Dr. Gan on 5/8/24         You will receive a phone call the day prior to surgery with your OR arrival time.  Be sure to follow upp pre op instructions given to you by pre admission testing.    A Prescription for  Oxycodone (pain) has been sent to your pharmacy.  Please pick this up and have it ready at home.      Call with any questions! [phone] for Adeola.

## 2024-04-08 NOTE — PROGRESS NOTES
[facility]  PRE-OP NOTE      Name: Cecelia Phillips  MRN: [id number]      Index Surgery  Date of Surgery: 12/1/2021   Surgeon: Dr. Stella Gan    Surgical Procedure: Laparoscopic sleeve gastrectomy  12624    Date of Surgery: 6/7/2023  Surgeon: Dr. Stella Gan  Surgical Procedure: Revision (sleeve to bypass) 14984     Date of Surgery: 5/8/2024  Surgeon: Dr. Stella Gan  Surgical Procedure: Cholecystectomy 76956    HPI: 29 year old female presenting today for a final pre operative visit prior to a planned cholecystectomy on 5/8/24 for biliary colic.       REVIEW OF SYSTEMS:  CONSTITUTIONAL: Patient denies fevers, chills, sweats and weight changes.  CARDIOVASCULAR: Patient denies chest pains, palpitations, orthopnea and paroxysmal nocturnal dyspnea.  RESPIRATORY: No dyspnea on exertion, no wheezing or cough.  GI: No nausea, vomiting, diarrhea, constipation, abdominal pain, hematochezia or melena.  MUSCULOSKELETAL: No myalgias or arthralgias.  NEUROLOGIC: No chronic headaches, no seizures. Patient denies numbness, tingling or weakness.  PSYCHIATRIC: Patient denies problems with mood disturbance. No problems with anxiety.  ENDOCRINE: No excessive urination or excessive thirst.  DERMATOLOGIC: Patient denies any rashes or skin changes.    PHYSICAL EXAM:  LMP  (LMP Unknown) Comment: mirena IUD insert 3/2023  Alert, well appearing, no acute distress, nourished, hydrated.  Anicteric sclera, no ptosis  Facial symmetry  Neck supple  Unlabored respirations.  Easily conversant without increased respiratory effort  Oriented to person, place, time.  Judgement intact.  Appropriate mood, affect.       ASSESSMENT & PLAN:  29 y.o. female presenting for a final pre op visit prior to a planned cholecystectomy on 5/8/24.        Plan:

## 2024-04-08 NOTE — PROGRESS NOTES
"[facility]  PRE-OP NOTE      Name: Cecelia Phillips  MRN: [id number]      Index Surgery  Date of Surgery: 12/1/2021   Surgeon: Dr. Stella Gan    Surgical Procedure: Laparoscopic sleeve gastrectomy  93052    Date of Surgery: 6/7/2023  Surgeon: Dr. Stella Gan  Surgical Procedure: Revision (sleeve to bypass) 55835     Date of Surgery: 5/8/2024  Surgeon: Dr. Stella Gan  Surgical Procedure: Cholecystectomy 11297    HPI: 29 year old female presenting today for a final pre operative visit prior to a planned cholecystectomy on 5/8/24 for biliary colic.       REVIEW OF SYSTEMS:  CONSTITUTIONAL: Patient denies fevers, chills, sweats and weight changes.  CARDIOVASCULAR: Patient denies chest pains, palpitations, orthopnea and paroxysmal nocturnal dyspnea.  RESPIRATORY: No dyspnea on exertion, no wheezing or cough.  GI: No nausea, vomiting, diarrhea, constipation, abdominal pain, hematochezia or melena.  MUSCULOSKELETAL: No myalgias or arthralgias.  NEUROLOGIC: No chronic headaches, no seizures. Patient denies numbness, tingling or weakness.  PSYCHIATRIC: Patient denies problems with mood disturbance. No problems with anxiety.  ENDOCRINE: No excessive urination or excessive thirst.  DERMATOLOGIC: Patient denies any rashes or skin changes.    PHYSICAL EXAM:  /81 (BP Location: Left arm, Patient Position: Sitting, BP Cuff Size: Large adult long)   Pulse 73   Ht 1.689 m (5' 6.5\")   Wt 104 kg (229 lb)   LMP  (LMP Unknown) Comment: mirena IUD insert 3/2023  SpO2 99%   BMI 36.41 kg/m²   Alert, well appearing, no acute distress, nourished, hydrated.  Anicteric sclera, no ptosis  Facial symmetry  Neck supple  Unlabored respirations.  Easily conversant without increased respiratory effort  Oriented to person, place, time.  Judgement intact.  Appropriate mood, affect.       ASSESSMENT & PLAN:  29 y.o. female presenting for a final pre op visit prior to a planned cholecystectomy on 5/8/24.    Patient with " history of sleeve gastrectomy converted to gastric bypass in May last year.  Has been having upper abdominal pain issues imaging studies were consistent with cholelithiasis and endoscopy did not reveal any ulcer or other mechanical issues.    Symptoms are still weight and also she has intolerance to solids specially meats    Not sure if the symptoms are completely related to cholelithiasis issue and worried about biliary stasis, dilated jejunojejunostomy, intussusception, internal hernia etc. among the other differential diagnosis.      Plan:    Diagnostic laparoscopy, cholecystectomy, intraoperative endoscopy possible revision of jejunojejunostomy or any other indicated and related procedures.    Risk-benefit alternatives of all these interventions including bleeding, infection, leak, DVT, PE, cardiopulmonary complications, protein calorie malnutrition, nutritional deficiencies, bile duct injuries or leak, need for further procedures or operations, chronic pain or even death etc. were all discussed and all questions were answered.    Informed consent was obtained prescriptions will be sent to her pharmacy.  Will proceed as planned.

## 2024-04-08 NOTE — H&P (VIEW-ONLY)
"[facility]ERY CLINIC  PRE-OP NOTE      Name: Cecelia Phillips  MRN: [id number]      Index Surgery  Date of Surgery: 12/1/2021   Surgeon: Dr. Stella Gan    Surgical Procedure: Laparoscopic sleeve gastrectomy  64255    Date of Surgery: 6/7/2023  Surgeon: Dr. Stella Gan  Surgical Procedure: Revision (sleeve to bypass) 13464     Date of Surgery: 5/8/2024  Surgeon: Dr. Stella Gan  Surgical Procedure: Cholecystectomy 20910    HPI: 29 year old female presenting today for a final pre operative visit prior to a planned cholecystectomy on 5/8/24 for biliary colic.       REVIEW OF SYSTEMS:  CONSTITUTIONAL: Patient denies fevers, chills, sweats and weight changes.  CARDIOVASCULAR: Patient denies chest pains, palpitations, orthopnea and paroxysmal nocturnal dyspnea.  RESPIRATORY: No dyspnea on exertion, no wheezing or cough.  GI: No nausea, vomiting, diarrhea, constipation, abdominal pain, hematochezia or melena.  MUSCULOSKELETAL: No myalgias or arthralgias.  NEUROLOGIC: No chronic headaches, no seizures. Patient denies numbness, tingling or weakness.  PSYCHIATRIC: Patient denies problems with mood disturbance. No problems with anxiety.  ENDOCRINE: No excessive urination or excessive thirst.  DERMATOLOGIC: Patient denies any rashes or skin changes.    PHYSICAL EXAM:  /81 (BP Location: Left arm, Patient Position: Sitting, BP Cuff Size: Large adult long)   Pulse 73   Ht 1.689 m (5' 6.5\")   Wt 104 kg (229 lb)   LMP  (LMP Unknown) Comment: mirena IUD insert 3/2023  SpO2 99%   BMI 36.41 kg/m²   Alert, well appearing, no acute distress, nourished, hydrated.  Anicteric sclera, no ptosis  Facial symmetry  Neck supple  Unlabored respirations.  Easily conversant without increased respiratory effort  Oriented to person, place, time.  Judgement intact.  Appropriate mood, affect.       ASSESSMENT & PLAN:  29 y.o. female presenting for a final pre op visit prior to a planned cholecystectomy on 5/8/24.    Patient with " history of sleeve gastrectomy converted to gastric bypass in May last year.  Has been having upper abdominal pain issues imaging studies were consistent with cholelithiasis and endoscopy did not reveal any ulcer or other mechanical issues.    Symptoms are still weight and also she has intolerance to solids specially meats    Not sure if the symptoms are completely related to cholelithiasis issue and worried about biliary stasis, dilated jejunojejunostomy, intussusception, internal hernia etc. among the other differential diagnosis.      Plan:    Diagnostic laparoscopy, cholecystectomy, intraoperative endoscopy possible revision of jejunojejunostomy or any other indicated and related procedures.    Risk-benefit alternatives of all these interventions including bleeding, infection, leak, DVT, PE, cardiopulmonary complications, protein calorie malnutrition, nutritional deficiencies, bile duct injuries or leak, need for further procedures or operations, chronic pain or even death etc. were all discussed and all questions were answered.    Informed consent was obtained prescriptions will be sent to her pharmacy.  Will proceed as planned.

## 2024-04-10 LAB
CYTOLOGY CMNT CVX/VAG CYTO-IMP: NORMAL
LAB AP CONTRACEPTIVE HISTORY: NORMAL
LAB AP HPV GENOTYPE QUESTION: YES
LAB AP HPV HR: NORMAL
LAB AP PAP ADDITIONAL TESTS: NORMAL
LABORATORY COMMENT REPORT: NORMAL
PATH REPORT.TOTAL CANCER: NORMAL

## 2024-04-11 ENCOUNTER — APPOINTMENT (OUTPATIENT)
Dept: SURGERY | Facility: CLINIC | Age: 29
End: 2024-04-11
Payer: COMMERCIAL

## 2024-04-12 ENCOUNTER — OFFICE VISIT (OUTPATIENT)
Dept: SURGERY | Facility: CLINIC | Age: 29
End: 2024-04-12
Payer: COMMERCIAL

## 2024-04-12 VITALS
HEIGHT: 67 IN | WEIGHT: 229 LBS | HEART RATE: 73 BPM | SYSTOLIC BLOOD PRESSURE: 119 MMHG | OXYGEN SATURATION: 99 % | DIASTOLIC BLOOD PRESSURE: 81 MMHG | BODY MASS INDEX: 35.94 KG/M2

## 2024-04-12 DIAGNOSIS — K91.2 POSTSURGICAL MALABSORPTION (HHS-HCC): ICD-10-CM

## 2024-04-12 DIAGNOSIS — R10.10 PAIN OF UPPER ABDOMEN: ICD-10-CM

## 2024-04-12 DIAGNOSIS — Z98.84 S/P GASTRIC BYPASS: ICD-10-CM

## 2024-04-12 DIAGNOSIS — G89.18 POST-OP PAIN: ICD-10-CM

## 2024-04-12 DIAGNOSIS — K82.8 GALLBLADDER SLUDGE: ICD-10-CM

## 2024-04-12 DIAGNOSIS — K80.50 BILIARY COLIC: Primary | ICD-10-CM

## 2024-04-12 PROCEDURE — 3008F BODY MASS INDEX DOCD: CPT | Performed by: SURGERY

## 2024-04-12 PROCEDURE — 99215 OFFICE O/P EST HI 40 MIN: CPT | Performed by: SURGERY

## 2024-04-12 PROCEDURE — 1036F TOBACCO NON-USER: CPT | Performed by: SURGERY

## 2024-04-23 ENCOUNTER — PRE-ADMISSION TESTING (OUTPATIENT)
Dept: PREADMISSION TESTING | Facility: HOSPITAL | Age: 29
End: 2024-04-23
Payer: COMMERCIAL

## 2024-04-23 ENCOUNTER — HOSPITAL ENCOUNTER (OUTPATIENT)
Dept: RADIOLOGY | Facility: HOSPITAL | Age: 29
Discharge: HOME | End: 2024-04-23
Payer: COMMERCIAL

## 2024-04-23 VITALS
HEART RATE: 81 BPM | HEIGHT: 67 IN | TEMPERATURE: 96.6 F | DIASTOLIC BLOOD PRESSURE: 90 MMHG | BODY MASS INDEX: 34.95 KG/M2 | RESPIRATION RATE: 18 BRPM | SYSTOLIC BLOOD PRESSURE: 128 MMHG | WEIGHT: 222.66 LBS | OXYGEN SATURATION: 99 %

## 2024-04-23 DIAGNOSIS — K80.50 BILIARY COLIC: ICD-10-CM

## 2024-04-23 DIAGNOSIS — Z01.818 PRE-OP TESTING: Primary | ICD-10-CM

## 2024-04-23 LAB
ABO GROUP (TYPE) IN BLOOD: NORMAL
ALBUMIN SERPL BCP-MCNC: 3.7 G/DL (ref 3.4–5)
ALP SERPL-CCNC: 82 U/L (ref 33–110)
ALT SERPL W P-5'-P-CCNC: 17 U/L (ref 7–45)
ANION GAP SERPL CALC-SCNC: 12 MMOL/L (ref 10–20)
ANTIBODY SCREEN: NORMAL
APPEARANCE UR: ABNORMAL
AST SERPL W P-5'-P-CCNC: 14 U/L (ref 9–39)
BACTERIA #/AREA URNS AUTO: ABNORMAL /HPF
BILIRUB SERPL-MCNC: 0.3 MG/DL (ref 0–1.2)
BILIRUB UR STRIP.AUTO-MCNC: NEGATIVE MG/DL
BUN SERPL-MCNC: 6 MG/DL (ref 6–23)
CALCIUM SERPL-MCNC: 8.7 MG/DL (ref 8.6–10.3)
CAOX CRY #/AREA UR COMP ASSIST: ABNORMAL /HPF
CHLORIDE SERPL-SCNC: 105 MMOL/L (ref 98–107)
CO2 SERPL-SCNC: 26 MMOL/L (ref 21–32)
COLOR UR: YELLOW
CREAT SERPL-MCNC: 0.61 MG/DL (ref 0.5–1.05)
EGFRCR SERPLBLD CKD-EPI 2021: >90 ML/MIN/1.73M*2
GLUCOSE SERPL-MCNC: 62 MG/DL (ref 74–99)
GLUCOSE UR STRIP.AUTO-MCNC: NEGATIVE MG/DL
HCT VFR BLD AUTO: 35.3 % (ref 36–46)
HGB BLD-MCNC: 11.3 G/DL (ref 12–16)
HOLD SPECIMEN: NORMAL
INR PPP: 1.1 (ref 0.9–1.1)
KETONES UR STRIP.AUTO-MCNC: NEGATIVE MG/DL
LEUKOCYTE ESTERASE UR QL STRIP.AUTO: ABNORMAL
MUCOUS THREADS #/AREA URNS AUTO: ABNORMAL /LPF
NITRITE UR QL STRIP.AUTO: NEGATIVE
PH UR STRIP.AUTO: 7 [PH]
POTASSIUM SERPL-SCNC: 3.5 MMOL/L (ref 3.5–5.3)
PROT SERPL-MCNC: 6.5 G/DL (ref 6.4–8.2)
PROT UR STRIP.AUTO-MCNC: NEGATIVE MG/DL
PROTHROMBIN TIME: 12 SECONDS (ref 9.8–12.8)
RBC # UR STRIP.AUTO: NEGATIVE /UL
RBC #/AREA URNS AUTO: ABNORMAL /HPF
RH FACTOR (ANTIGEN D): NORMAL
SODIUM SERPL-SCNC: 139 MMOL/L (ref 136–145)
SP GR UR STRIP.AUTO: 1.02
SQUAMOUS #/AREA URNS AUTO: ABNORMAL /HPF
UROBILINOGEN UR STRIP.AUTO-MCNC: <2 MG/DL
WBC #/AREA URNS AUTO: ABNORMAL /HPF

## 2024-04-23 PROCEDURE — 81001 URINALYSIS AUTO W/SCOPE: CPT

## 2024-04-23 PROCEDURE — 71045 X-RAY EXAM CHEST 1 VIEW: CPT

## 2024-04-23 PROCEDURE — 85014 HEMATOCRIT: CPT

## 2024-04-23 PROCEDURE — 85610 PROTHROMBIN TIME: CPT

## 2024-04-23 PROCEDURE — 99203 OFFICE O/P NEW LOW 30 MIN: CPT | Performed by: NURSE PRACTITIONER

## 2024-04-23 PROCEDURE — 84075 ASSAY ALKALINE PHOSPHATASE: CPT

## 2024-04-23 PROCEDURE — 86901 BLOOD TYPING SEROLOGIC RH(D): CPT

## 2024-04-23 PROCEDURE — 80323 ALKALOIDS NOS: CPT

## 2024-04-23 PROCEDURE — 36415 COLL VENOUS BLD VENIPUNCTURE: CPT

## 2024-04-23 PROCEDURE — 71045 X-RAY EXAM CHEST 1 VIEW: CPT | Performed by: RADIOLOGY

## 2024-04-23 PROCEDURE — 87086 URINE CULTURE/COLONY COUNT: CPT | Mod: PARLAB

## 2024-04-23 RX ORDER — HYDROXYZINE HYDROCHLORIDE 25 MG/1
50 TABLET, FILM COATED ORAL NIGHTLY
COMMUNITY

## 2024-04-23 ASSESSMENT — DUKE ACTIVITY SCORE INDEX (DASI)
CAN YOU DO HEAVY WORK AROUND THE HOUSE LIKE SCRUBBING FLOORS OR LIFTING AND MOVING HEAVY FURNITURE: YES
CAN YOU DO MODERATE WORK AROUND THE HOUSE LIKE VACUUMING, SWEEPING FLOORS OR CARRYING GROCERIES: YES
CAN YOU PARTICIPATE IN MODERATE RECREATIONAL ACTIVITIES LIKE GOLF, BOWLING, DANCING, DOUBLES TENNIS OR THROWING A BASEBALL OR FOOTBALL: YES
CAN YOU RUN A SHORT DISTANCE: YES
DASI METS SCORE: 9
CAN YOU TAKE CARE OF YOURSELF (EAT, DRESS, BATHE, OR USE TOILET): YES
TOTAL_SCORE: 50.7
CAN YOU WALK INDOORS, SUCH AS AROUND YOUR HOUSE: YES
CAN YOU WALK A BLOCK OR TWO ON LEVEL GROUND: YES
CAN YOU DO YARD WORK LIKE RAKING LEAVES, WEEDING OR PUSHING A MOWER: YES
CAN YOU DO LIGHT WORK AROUND THE HOUSE LIKE DUSTING OR WASHING DISHES: YES
CAN YOU HAVE SEXUAL RELATIONS: YES
CAN YOU PARTICIPATE IN STRENOUS SPORTS LIKE SWIMMING, SINGLES TENNIS, FOOTBALL, BASKETBALL, OR SKIING: NO
CAN YOU CLIMB A FLIGHT OF STAIRS OR WALK UP A HILL: YES

## 2024-04-23 NOTE — PREPROCEDURE INSTRUCTIONS
Medication List            Accurate as of April 23, 2024 10:35 AM. Always use your most recent med list.                Adults Multivitamin 18 mg iron-400 mcg-25 mcg tablet  Generic drug: multivitamin with minerals  Medication Adjustments for Surgery: Stop 7 days before surgery     calcium carbonate-vitamin D3 1,000 mg-20 mcg (800 unit) tablet  Medication Adjustments for Surgery: Stop 7 days before surgery     ergocalciferol 1.25 MG (36842 UT) capsule  Commonly known as: Vitamin D-2  Medication Adjustments for Surgery: Stop 7 days before surgery     esomeprazole 40 mg packet  Commonly known as: NexIUM Packet  Take 40 mg by mouth once daily in the morning. Take before meals. Mix packet in 10mL water and take twice daily  Medication Adjustments for Surgery: Take morning of surgery with sip of water, no other fluids     ferrous gluconate 236 mg (27 mg iron) tablet  Medication Adjustments for Surgery: Stop 7 days before surgery     hydrOXYzine HCL 25 mg tablet  Commonly known as: Atarax  Medication Adjustments for Surgery: Continue until night before surgery     Mirena 21 mcg/24 hours (8 yrs) 52 mg IUD  Generic drug: levonorgestrel  Medication Adjustments for Surgery: Continue until night before surgery     omeprazole 40 mg DR capsule  Commonly known as: PriLOSEC  Medication Adjustments for Surgery: Take morning of surgery with sip of water, no other fluids     ondansetron ODT 4 mg disintegrating tablet  Commonly known as: Zofran-ODT  Take 1-2 tablets (4-8 mg) by mouth every 8 hours if needed for nausea or vomiting.  Medication Adjustments for Surgery: Other (Comment)  Notes to patient: Continue as prescribed     promethazine 12.5 mg tablet  Commonly known as: Phenergan  Medication Adjustments for Surgery: Take morning of surgery with sip of water, no other fluids     ursodiol 250 mg tablet  Commonly known as: ANGELO 250  Take 1 tablet (250 mg) by mouth 2 times a day.  Medication Adjustments for Surgery: Continue until  night before surgery     Vitamin B-1 100 mg tablet  Generic drug: thiamine  Medication Adjustments for Surgery: Stop 7 days before surgery                              NPO Instructions:    Do not eat any food after midnight the night before your surgery/procedure.    Additional Instructions:     Day of Surgery:  You may chew gum up to TWO hours before your surgery/procedure  Wear  comfortable loose fitting clothing  Do not use moisturizers, creams, lotions or perfume  All jewelry and valuables should be left at home      PRE-OPERATIVE INSTRUCTIONS FOR SURGERY    *Do not eat anything after midnight the night of surgery.  This includes food of any kind (including hard candy, cough drops, mints).   You may have up to TEN ounces of clear liquid  until TWO hours prior to your arrival time to the hospital.  This includes water, black tea/coffee, (no milk or cream) apple juice and electrolyte drinks (GATORADE).  You may chew gum until TWO hours prior you your surgery/procedure.         *One of our staff members will call you ONE business day before your surgery, between 11am-2 pm to let you know the time to arrive.  If you have no received a call by 2 pm, call [phone]  *When you arrive at the hospital-->GO TO Registration on the ground floor  *Stop smoking 24 hours prior to surgery.  No Marijuana, CBD Oil or Vaping for 48 hours  *No alcohol 24 hours prior to surgery  *You will need a responsible adult to drive you home  -No acrylic nails or nail polish on at least one fingernail, NO polish on toes for foot surgery  -You may be asked to remove your dentures, partial plate, eyeglasses or contact lenses before going to surgery.  Please bring a case for these items.  -Body piercings need to be removed.  Jewelry and valuables should be left at home.  -Put on loose,  comfortable, clean clothing, that will accommodate bandages      What you may be asked to bring to surgery:  ___Urine specimen       Have designated  to  take you home

## 2024-04-23 NOTE — CPM/PAT H&P
"CPM/PAT Evaluation       Name: Cecelia Phillips (Cecelia Phillips)  /Age: 1995/ y.o.     In-Person       Chief Complaint: Biliary colic    29 yr old female with c/o ongoing GI symptoms including acid reflux.  Reports hx of nausea, vomiting with almost all foods accompanied by Right upper abdominal pain, burping and heartburn.  Additionally states at times Right upper abdomin gets \"hard\".  States cannot keep any food down, seems it \"sits on chest\", followed by a \"gag\" and vomiting which ends with relief.  Denies blood in emesis or stool. States can tolerate only liquid protein drinks and fish sometimes.  Reports remaining otherwise physically active, denies cardiac or respiratory symptoms.  Denies past issues with anesthesia, except becoming very itchy post op with last surgery and was given meds which helped; denied any rash.      Pertinent info:  Hx sleeve gastrectomy converted to gastric bypass (May 2023)    Followed by PCP (Eddie) - last office visit 2023  Followed by PCP (Ousmane) -  last office visit 10/22/2023        Past Medical History:   Diagnosis Date    Acute streptococcal tonsillitis, unspecified 2019    Strep tonsillitis    Anxiety     Depression     Encounter for contraceptive management, unspecified 2020    Contraception management    Encounter for insertion of intrauterine contraceptive device 2020    Encounter for insertion of mirena IUD    Encounter for insertion of intrauterine contraceptive device 2016    Encounter for IUD insertion    Encounter for insertion of intrauterine contraceptive device 2015    Encounter for insertion of mirena IUD    Encounter for pregnancy test, result negative 2016    Pregnancy test negative    Encounter for screening for infections with a predominantly sexual mode of transmission 2016    Screen for STD (sexually transmitted disease)    GERD (gastroesophageal reflux disease)     Headache     Inadequate sleep hygiene " 04/15/2021    History of difficulty sleeping    Low back pain, unspecified 06/15/2017    Acute low back pain    Morbid (severe) obesity due to excess calories (Multi) 07/06/2020    Class 3 severe obesity due to excess calories with serious comorbidity and body mass index (BMI) of 40.0 to 44.9 in adult    Nontoxic goiter, unspecified 04/18/2021    Goiter    Other specified anxiety disorders 11/09/2015    Depression with anxiety    Other specified disorders of eustachian tube, right ear 08/08/2019    Dysfunction of right eustachian tube    Papillomavirus as the cause of diseases classified elsewhere 09/21/2017    HPV in female    Personal history of diseases of the skin and subcutaneous tissue 08/09/2019    History of acne    Personal history of diseases of the skin and subcutaneous tissue 02/26/2016    History of cellulitis    Personal history of diseases of the skin and subcutaneous tissue 02/26/2016    History of skin pruritus    Personal history of other diseases of the female genital tract 01/06/2017    History of dysmenorrhea    Personal history of other diseases of the female genital tract 12/07/2015    History of irregular menstrual cycles    Personal history of other diseases of the female genital tract 11/09/2015    History of vaginitis    Personal history of other diseases of the female genital tract 10/11/2018    History of irregular menstrual cycles    Personal history of other diseases of the respiratory system 04/28/2016    History of acute sinusitis    Personal history of other diseases of the respiratory system 04/28/2016    History of upper respiratory infection    Personal history of other diseases of the respiratory system 10/08/2021    History of acute sinusitis    Personal history of other endocrine, nutritional and metabolic disease 12/07/2015    History of hyperprolactinemia    Personal history of other infectious and parasitic diseases 10/14/2021    History of candidiasis of vagina    Personal  history of other specified conditions 11/16/2015    History of urinary frequency    Persons encountering health services in other specified circumstances 08/09/2019    Establishing care with new doctor, encounter for    Right upper quadrant pain 04/28/2016    Right upper quadrant abdominal pain    Somnolence 04/15/2021    Daytime sleepiness    Strain of muscle, fascia and tendon of lower back, initial encounter 10/10/2016    Lumbar strain       Past Surgical History:   Procedure Laterality Date    ADENOIDECTOMY      BARIATRIC SURGERY      OTHER SURGICAL HISTORY  01/03/2022    Sleeve gastrectomy    TONSILLECTOMY  11/02/2015    Tonsillectomy With Adenoidectomy       Patient  reports that she is not currently sexually active and has had partner(s) who are male. She reports using the following method of birth control/protection: I.U.D..    Family History   Problem Relation Name Age of Onset    Other (Diabetes mellitus) Mother delmonsangeeta     Diabetes Mother delmonsangeeta     No Known Problems Father      Prostate cancer Father's Brother      Hypertension Maternal Grandmother emmarine     Esophageal cancer Paternal Grandfather      Cancer Maternal Grandfather Mandie        No Known Allergies    Prior to Admission medications    Medication Sig Start Date End Date Taking? Authorizing Provider   calcium carbonate-vitamin D3 1,000 mg-20 mcg (800 unit) tablet Take by mouth.   Yes Historical Provider, MD   ergocalciferol (Vitamin D-2) 1.25 MG (08840 UT) capsule Take 1 capsule twice weekly x 8 weeks then start Vitamin D3 2000 units daily over the counter   Yes Historical Provider, MD   esomeprazole (NexIUM Packet) 40 mg packet Take 40 mg by mouth once daily in the morning. Take before meals. Mix packet in 10mL water and take twice daily 11/6/23  Yes Yadira Carter, APRN-CNP   ferrous gluconate 236 mg (27 mg iron) tablet Take by mouth.   Yes Historical Provider, MD   hydrOXYzine HCL (Atarax) 25 mg tablet Take 2 tablets (50 mg) by  mouth once daily at bedtime.   Yes Historical Provider, MD   levonorgestrel (Mirena) 21 mcg/24 hours (8 yrs) 52 mg IUD by intrauterine route. 6/17/20  Yes Historical Provider, MD   multivitamin-min-iron-FA-vit K (Adults Multivitamin) 18 mg iron-400 mcg-25 mcg tablet Take by mouth.   Yes Historical Provider, MD   omeprazole (PriLOSEC) 40 mg DR capsule Take by mouth once daily. 5/26/23  Yes Historical Provider, MD   ondansetron ODT (Zofran-ODT) 4 mg disintegrating tablet Take 1-2 tablets (4-8 mg) by mouth every 8 hours if needed for nausea or vomiting. 11/6/23  Yes TRAV Villalobos   promethazine (Phenergan) 12.5 mg tablet Take 1 tablet (12.5 mg) by mouth every 6 hours if needed for nausea.   Yes Historical Provider, MD   Vitamin B-1 100 mg tablet Take 0.5 tablets (50 mg) by mouth once daily. 7/3/23  Yes Historical Provider, MD   ursodiol (ANGELO 250) 250 mg tablet Take 1 tablet (250 mg) by mouth 2 times a day. 12/1/23   TRAV Villalobos   hydrOXYzine HCL (Atarax) 25 mg tablet Take 1 tablet (25 mg) by mouth once daily at bedtime. 12/18/23 4/23/24  Yennifer Romeo, DO        Review of Systems    Constitutional: no fever, no chills and not feeling poorly.   Eyes: no eyesight problems.   ENT: no hearing loss, no nosebleeds and no sore throat.   Cardiovascular: no chest pain, no palpitations and no extremity edema.   Respiratory: no shortness of breath, no wheezing, no cough and no sob with exertion.   Gastrointestinal: see HPI, denies blood in stools or changes in bowel habits   Genitourinary: negative for dysuria, incontinence or changes in urinary habits   Musculoskeletal: no arthralgias and no gait abnormality.   Integumentary: negative for lesions, rash or itching.   Neurological: negative for confusion, dizziness, fainting or difficulty walking.   Psychiatric: not suicidal, no anxiety and no depression.   All other systems have been reviewed and are negative for complaint.     Physical  Exam  Constitutional:       General: No acute distress.     Aox3, pleasant and cooperative, appropriate mood and eye contact   HENT:      Head: Normocephalic.      Mouth/Throat: Mucous membranes moist & pink  Eyes:      Vision grossly intact, PERRLA   Neck:      No carotid bruit, no JVD  Cardiovascular:      RRR, S1S2, no murmurs, rubs or gallops  Pulmonary:      Symmetric chest expansion, CTA, Room Air  Abdominal:      BSx4   Skin:     Warm, dry & intact   Extremities:      No gross deformities; normal gait   Neurological:      No focal deficit, Aox3, MIDDLETON x4  Psychiatric:      Pleasant & cooperative, appropriate affect    PAT AIRWAY:   Airway:     Mallampati::  I    TM distance::  >3 FB    Neck ROM::  Full  normal        Visit Vitals  /90   Pulse 81   Temp 35.9 °C (96.6 °F) (Tympanic)   Resp 18       DASI Risk Score      Flowsheet Row Most Recent Value   DASI SCORE 50.7   METS Score (Will be calculated only when all the questions are answered) 9          Caprini DVT Assessment    No data to display       Modified Frailty Index    No data to display       CHADS2 Stroke Risk  Current as of 10 minutes ago        N/A 3 to 100%: High Risk   2 to < 3%: Medium Risk   0 to < 2%: Low Risk     Last Change: N/A          This score determines the patient's risk of having a stroke if the patient has atrial fibrillation.        This score is not applicable to this patient. Components are not calculated.          Revised Cardiac Risk Index    No data to display       Apfel Simplified Score    No data to display       Risk Analysis Index Results This Encounter    No data found in the last 1 encounters.       Stop Bang Score      Flowsheet Row Most Recent Value   Do you snore loudly? 0   Do you often feel tired or fatigued after your sleep? 0   Has anyone ever observed you stop breathing in your sleep? 0   Do you have or are you being treated for high blood pressure? 0   Recent BMI (Calculated) 35.4   Is BMI greater than 35  kg/m2? 1=Yes   Age older than 50 years old? 0=No   Gender - Male 0=No            Assessment and Plan:     Followed by general surgery, Biliary colic    Laparoscopic cholecystectomy/possible open w/Dr Gan on 5/8/2024

## 2024-04-24 LAB — BACTERIA UR CULT: NORMAL

## 2024-04-27 LAB
ANABASINE UR-MCNC: <5 NG/ML
COTININE UR-MCNC: <15 NG/ML
NICOTINE UR-MCNC: <15 NG/ML
TRANS-3-OH-COTININE UR-MCNC: <50 NG/ML

## 2024-05-02 NOTE — PROGRESS NOTES
GENERAL SURGERY CLINIC  FOLLOW UP NOTE      Name: Cecelia Phillips  MRN: [id number]      Index Surgery  Date of Surgery: 5/8/2024   Surgeon: Dr. Gan    Surgical Procedure: Other: Lap cholecystectomy      HPI: 29 year old female presenting today for a 1 week post op visit. Patient is s/p robotic assist cholecystectomy / small bowel resection / closure of internal peritoneum and TAP block.  Patient has a remote history of sleeve to bypass conversion 6/7/2023.     Concerns related to:  Nausea/Vomiting, Reflux: Denies  Abdominal Pain: Tenderness, especially with ambulation  Diarrhea/Constipation Denies        REVIEW OF SYSTEMS:  CONSTITUTIONAL: Patient denies fevers, chills, sweats and weight changes.  CARDIOVASCULAR: Patient denies chest pains, palpitations, orthopnea and paroxysmal nocturnal dyspnea.  RESPIRATORY: No dyspnea on exertion, no wheezing or cough.  GI: No nausea, vomiting, diarrhea, constipation, abdominal pain, hematochezia or melena.  MUSCULOSKELETAL: No myalgias or arthralgias.  NEUROLOGIC: No chronic headaches, no seizures. Patient denies numbness, tingling or weakness.  PSYCHIATRIC: Patient denies problems with mood disturbance. No problems with anxiety.  ENDOCRINE: No excessive urination or excessive thirst.  DERMATOLOGIC: Patient denies any rashes or skin changes.    PHYSICAL EXAM:  There were no vitals taken for this visit.  Alert, well appearing, no acute distress, nourished, hydrated.  Anicteric sclera, no ptosis  Facial symmetry  Neck supple  Unlabored respirations.  Easily conversant without increased respiratory effort  Oriented to person, place, time.  Judgement intact.  Appropriate mood, affect.   Abdomen soft, nondistended, incisions are healing well, no sign of infection    Right-sided specimen extraction site port has ecchymosis and small hematoma underneath it and most of the tenderness was around that area and she has been having pain in the same area.      ASSESSMENT & PLAN:  29 y.o.  female presenting for follow up visit s/p Lap Cholecystectomy     Patient went to emergency room for right lower quadrant pain, labs were unremarkable and CT scan revealed a small fluid collection in the gallbladder fossa, she was given prescription for Cipro and Flagyl which she has not filled yet.  Denies any fever or chills jaundice at home.  Main complaint has been right lower quadrant abdominal pain.    Pain seems to be musculoskeletal from underlying suture as well as small hematoma.  Using sterile precautions area was injected with mixture of lidocaine, Marcaine, Kenalog with immediate moderate relief.    No acute post surgery complications, collection could be postsurgical hematoma versus biloma, to be on safe side advised the patient to complete 1 week course of antibiotics which was prescribed in the emergency room.  Wear abdominal binder for activity   Follow up as discussed

## 2024-05-06 RX ORDER — INDOCYANINE GREEN AND WATER 25 MG
2.5 KIT INJECTION ONCE
Status: CANCELLED | OUTPATIENT
Start: 2024-05-06 | End: 2024-05-06

## 2024-05-08 ENCOUNTER — ANESTHESIA (OUTPATIENT)
Dept: OPERATING ROOM | Facility: HOSPITAL | Age: 29
DRG: 419 | End: 2024-05-08
Payer: COMMERCIAL

## 2024-05-08 ENCOUNTER — HOSPITAL ENCOUNTER (INPATIENT)
Facility: HOSPITAL | Age: 29
LOS: 1 days | Discharge: HOME | DRG: 419 | End: 2024-05-09
Attending: SURGERY | Admitting: SURGERY
Payer: COMMERCIAL

## 2024-05-08 ENCOUNTER — ANESTHESIA EVENT (OUTPATIENT)
Dept: OPERATING ROOM | Facility: HOSPITAL | Age: 29
DRG: 419 | End: 2024-05-08
Payer: COMMERCIAL

## 2024-05-08 DIAGNOSIS — K21.9 GASTROESOPHAGEAL REFLUX DISEASE WITHOUT ESOPHAGITIS: ICD-10-CM

## 2024-05-08 DIAGNOSIS — K80.50 BILIARY COLIC: Primary | ICD-10-CM

## 2024-05-08 PROBLEM — K81.9 CHOLECYSTITIS: Status: ACTIVE | Noted: 2024-05-08

## 2024-05-08 LAB
ABO GROUP (TYPE) IN BLOOD: NORMAL
ALBUMIN SERPL BCP-MCNC: 3.9 G/DL (ref 3.4–5)
ALP SERPL-CCNC: 84 U/L (ref 33–110)
ALT SERPL W P-5'-P-CCNC: 50 U/L (ref 7–45)
ANION GAP SERPL CALC-SCNC: 10 MMOL/L (ref 10–20)
AST SERPL W P-5'-P-CCNC: 61 U/L (ref 9–39)
BASOPHILS # BLD AUTO: 0.02 X10*3/UL (ref 0–0.1)
BASOPHILS NFR BLD AUTO: 0.2 %
BILIRUB SERPL-MCNC: 0.3 MG/DL (ref 0–1.2)
BUN SERPL-MCNC: 8 MG/DL (ref 6–23)
CALCIUM SERPL-MCNC: 8.7 MG/DL (ref 8.6–10.3)
CHLORIDE SERPL-SCNC: 103 MMOL/L (ref 98–107)
CO2 SERPL-SCNC: 28 MMOL/L (ref 21–32)
CREAT SERPL-MCNC: 0.64 MG/DL (ref 0.5–1.05)
EGFRCR SERPLBLD CKD-EPI 2021: >90 ML/MIN/1.73M*2
EOSINOPHIL # BLD AUTO: 0 X10*3/UL (ref 0–0.7)
EOSINOPHIL NFR BLD AUTO: 0 %
ERYTHROCYTE [DISTWIDTH] IN BLOOD BY AUTOMATED COUNT: 15.6 % (ref 11.5–14.5)
GLUCOSE SERPL-MCNC: 117 MG/DL (ref 74–99)
HCT VFR BLD AUTO: 37.9 % (ref 36–46)
HGB BLD-MCNC: 12.3 G/DL (ref 12–16)
IMM GRANULOCYTES # BLD AUTO: 0.05 X10*3/UL (ref 0–0.7)
IMM GRANULOCYTES NFR BLD AUTO: 0.4 % (ref 0–0.9)
LYMPHOCYTES # BLD AUTO: 1.44 X10*3/UL (ref 1.2–4.8)
LYMPHOCYTES NFR BLD AUTO: 11.7 %
MCH RBC QN AUTO: 25.3 PG (ref 26–34)
MCHC RBC AUTO-ENTMCNC: 32.5 G/DL (ref 32–36)
MCV RBC AUTO: 78 FL (ref 80–100)
MONOCYTES # BLD AUTO: 0.36 X10*3/UL (ref 0.1–1)
MONOCYTES NFR BLD AUTO: 2.9 %
NEUTROPHILS # BLD AUTO: 10.42 X10*3/UL (ref 1.2–7.7)
NEUTROPHILS NFR BLD AUTO: 84.8 %
NRBC BLD-RTO: 0 /100 WBCS (ref 0–0)
PLATELET # BLD AUTO: 162 X10*3/UL (ref 150–450)
POTASSIUM SERPL-SCNC: 4 MMOL/L (ref 3.5–5.3)
PREGNANCY TEST URINE, POC: NEGATIVE
PROT SERPL-MCNC: 7.3 G/DL (ref 6.4–8.2)
RBC # BLD AUTO: 4.87 X10*6/UL (ref 4–5.2)
RH FACTOR (ANTIGEN D): NORMAL
SODIUM SERPL-SCNC: 137 MMOL/L (ref 136–145)
WBC # BLD AUTO: 12.3 X10*3/UL (ref 4.4–11.3)

## 2024-05-08 PROCEDURE — 81025 URINE PREGNANCY TEST: CPT | Performed by: STUDENT IN AN ORGANIZED HEALTH CARE EDUCATION/TRAINING PROGRAM

## 2024-05-08 PROCEDURE — 2500000005 HC RX 250 GENERAL PHARMACY W/O HCPCS: Performed by: ANESTHESIOLOGIST ASSISTANT

## 2024-05-08 PROCEDURE — 2720000007 HC OR 272 NO HCPCS: Performed by: SURGERY

## 2024-05-08 PROCEDURE — 2500000004 HC RX 250 GENERAL PHARMACY W/ HCPCS (ALT 636 FOR OP/ED): Mod: JZ | Performed by: STUDENT IN AN ORGANIZED HEALTH CARE EDUCATION/TRAINING PROGRAM

## 2024-05-08 PROCEDURE — 2500000004 HC RX 250 GENERAL PHARMACY W/ HCPCS (ALT 636 FOR OP/ED): Performed by: SURGERY

## 2024-05-08 PROCEDURE — 2500000001 HC RX 250 WO HCPCS SELF ADMINISTERED DRUGS (ALT 637 FOR MEDICARE OP): Performed by: STUDENT IN AN ORGANIZED HEALTH CARE EDUCATION/TRAINING PROGRAM

## 2024-05-08 PROCEDURE — 2500000004 HC RX 250 GENERAL PHARMACY W/ HCPCS (ALT 636 FOR OP/ED): Performed by: ANESTHESIOLOGY

## 2024-05-08 PROCEDURE — 47562 LAPAROSCOPIC CHOLECYSTECTOMY: CPT | Performed by: STUDENT IN AN ORGANIZED HEALTH CARE EDUCATION/TRAINING PROGRAM

## 2024-05-08 PROCEDURE — G0378 HOSPITAL OBSERVATION PER HR: HCPCS

## 2024-05-08 PROCEDURE — 3600000003 HC OR TIME - INITIAL BASE CHARGE - PROCEDURE LEVEL THREE: Performed by: SURGERY

## 2024-05-08 PROCEDURE — 96372 THER/PROPH/DIAG INJ SC/IM: CPT | Performed by: STUDENT IN AN ORGANIZED HEALTH CARE EDUCATION/TRAINING PROGRAM

## 2024-05-08 PROCEDURE — 47562 LAPAROSCOPIC CHOLECYSTECTOMY: CPT | Performed by: SURGERY

## 2024-05-08 PROCEDURE — 3700000002 HC GENERAL ANESTHESIA TIME - EACH INCREMENTAL 1 MINUTE: Performed by: SURGERY

## 2024-05-08 PROCEDURE — 0DJ08ZZ INSPECTION OF UPPER INTESTINAL TRACT, VIA NATURAL OR ARTIFICIAL OPENING ENDOSCOPIC: ICD-10-PCS | Performed by: SURGERY

## 2024-05-08 PROCEDURE — 44202 LAP ENTERECTOMY: CPT | Performed by: STUDENT IN AN ORGANIZED HEALTH CARE EDUCATION/TRAINING PROGRAM

## 2024-05-08 PROCEDURE — A47562 PR LAP,CHOLECYSTECTOMY: Performed by: ANESTHESIOLOGY

## 2024-05-08 PROCEDURE — 8E0W4CZ ROBOTIC ASSISTED PROCEDURE OF TRUNK REGION, PERCUTANEOUS ENDOSCOPIC APPROACH: ICD-10-PCS | Performed by: SURGERY

## 2024-05-08 PROCEDURE — 36415 COLL VENOUS BLD VENIPUNCTURE: CPT | Performed by: STUDENT IN AN ORGANIZED HEALTH CARE EDUCATION/TRAINING PROGRAM

## 2024-05-08 PROCEDURE — 85025 COMPLETE CBC W/AUTO DIFF WBC: CPT | Performed by: STUDENT IN AN ORGANIZED HEALTH CARE EDUCATION/TRAINING PROGRAM

## 2024-05-08 PROCEDURE — A47562 PR LAP,CHOLECYSTECTOMY: Performed by: ANESTHESIOLOGIST ASSISTANT

## 2024-05-08 PROCEDURE — 7100000002 HC RECOVERY ROOM TIME - EACH INCREMENTAL 1 MINUTE: Performed by: SURGERY

## 2024-05-08 PROCEDURE — 43235 EGD DIAGNOSTIC BRUSH WASH: CPT | Performed by: SURGERY

## 2024-05-08 PROCEDURE — 36415 COLL VENOUS BLD VENIPUNCTURE: CPT | Performed by: SURGERY

## 2024-05-08 PROCEDURE — 0FT44ZZ RESECTION OF GALLBLADDER, PERCUTANEOUS ENDOSCOPIC APPROACH: ICD-10-PCS | Performed by: SURGERY

## 2024-05-08 PROCEDURE — 7100000011 HC EXTENDED STAY RECOVERY HOURLY - NURSING UNIT

## 2024-05-08 PROCEDURE — 88304 TISSUE EXAM BY PATHOLOGIST: CPT | Performed by: STUDENT IN AN ORGANIZED HEALTH CARE EDUCATION/TRAINING PROGRAM

## 2024-05-08 PROCEDURE — 0DB94ZZ EXCISION OF DUODENUM, PERCUTANEOUS ENDOSCOPIC APPROACH: ICD-10-PCS | Performed by: SURGERY

## 2024-05-08 PROCEDURE — 3600000008 HC OR TIME - EACH INCREMENTAL 1 MINUTE - PROCEDURE LEVEL THREE: Performed by: SURGERY

## 2024-05-08 PROCEDURE — 80053 COMPREHEN METABOLIC PANEL: CPT | Performed by: STUDENT IN AN ORGANIZED HEALTH CARE EDUCATION/TRAINING PROGRAM

## 2024-05-08 PROCEDURE — 2500000005 HC RX 250 GENERAL PHARMACY W/O HCPCS: Performed by: STUDENT IN AN ORGANIZED HEALTH CARE EDUCATION/TRAINING PROGRAM

## 2024-05-08 PROCEDURE — 3700000001 HC GENERAL ANESTHESIA TIME - INITIAL BASE CHARGE: Performed by: SURGERY

## 2024-05-08 PROCEDURE — 88307 TISSUE EXAM BY PATHOLOGIST: CPT | Performed by: STUDENT IN AN ORGANIZED HEALTH CARE EDUCATION/TRAINING PROGRAM

## 2024-05-08 PROCEDURE — 88307 TISSUE EXAM BY PATHOLOGIST: CPT | Mod: TC,PARLAB | Performed by: STUDENT IN AN ORGANIZED HEALTH CARE EDUCATION/TRAINING PROGRAM

## 2024-05-08 PROCEDURE — 7100000001 HC RECOVERY ROOM TIME - INITIAL BASE CHARGE: Performed by: SURGERY

## 2024-05-08 PROCEDURE — 44202 LAP ENTERECTOMY: CPT | Performed by: SURGERY

## 2024-05-08 PROCEDURE — 2780000003 HC OR 278 NO HCPCS: Performed by: SURGERY

## 2024-05-08 PROCEDURE — 2500000004 HC RX 250 GENERAL PHARMACY W/ HCPCS (ALT 636 FOR OP/ED): Performed by: ANESTHESIOLOGIST ASSISTANT

## 2024-05-08 RX ORDER — SODIUM CHLORIDE, SODIUM LACTATE, POTASSIUM CHLORIDE, CALCIUM CHLORIDE 600; 310; 30; 20 MG/100ML; MG/100ML; MG/100ML; MG/100ML
150 INJECTION, SOLUTION INTRAVENOUS CONTINUOUS
Status: DISCONTINUED | OUTPATIENT
Start: 2024-05-08 | End: 2024-05-09 | Stop reason: HOSPADM

## 2024-05-08 RX ORDER — OXYCODONE HCL 5 MG/5 ML
5 SOLUTION, ORAL ORAL EVERY 4 HOURS PRN
Status: DISCONTINUED | OUTPATIENT
Start: 2024-05-08 | End: 2024-05-08

## 2024-05-08 RX ORDER — KETOROLAC TROMETHAMINE 30 MG/ML
INJECTION, SOLUTION INTRAMUSCULAR; INTRAVENOUS AS NEEDED
Status: DISCONTINUED | OUTPATIENT
Start: 2024-05-08 | End: 2024-05-08

## 2024-05-08 RX ORDER — SODIUM CHLORIDE, SODIUM LACTATE, POTASSIUM CHLORIDE, CALCIUM CHLORIDE 600; 310; 30; 20 MG/100ML; MG/100ML; MG/100ML; MG/100ML
100 INJECTION, SOLUTION INTRAVENOUS CONTINUOUS
Status: DISCONTINUED | OUTPATIENT
Start: 2024-05-08 | End: 2024-05-09 | Stop reason: HOSPADM

## 2024-05-08 RX ORDER — BUPIVACAINE HYDROCHLORIDE 2.5 MG/ML
INJECTION, SOLUTION EPIDURAL; INFILTRATION; INTRACAUDAL AS NEEDED
Status: DISCONTINUED | OUTPATIENT
Start: 2024-05-08 | End: 2024-05-08 | Stop reason: HOSPADM

## 2024-05-08 RX ORDER — LIDOCAINE HYDROCHLORIDE 40 MG/ML
INJECTION, SOLUTION RETROBULBAR AS NEEDED
Status: DISCONTINUED | OUTPATIENT
Start: 2024-05-08 | End: 2024-05-08

## 2024-05-08 RX ORDER — OXYCODONE HCL 5 MG/5 ML
10 SOLUTION, ORAL ORAL EVERY 4 HOURS PRN
Status: DISCONTINUED | OUTPATIENT
Start: 2024-05-08 | End: 2024-05-09 | Stop reason: HOSPADM

## 2024-05-08 RX ORDER — PANTOPRAZOLE SODIUM 40 MG/10ML
40 INJECTION, POWDER, LYOPHILIZED, FOR SOLUTION INTRAVENOUS
Status: DISCONTINUED | OUTPATIENT
Start: 2024-05-09 | End: 2024-05-09 | Stop reason: HOSPADM

## 2024-05-08 RX ORDER — CLONIDINE 100 UG/ML
INJECTION, SOLUTION EPIDURAL AS NEEDED
Status: DISCONTINUED | OUTPATIENT
Start: 2024-05-08 | End: 2024-05-08 | Stop reason: HOSPADM

## 2024-05-08 RX ORDER — GABAPENTIN 300 MG/1
600 CAPSULE ORAL ONCE
Status: COMPLETED | OUTPATIENT
Start: 2024-05-08 | End: 2024-05-08

## 2024-05-08 RX ORDER — GLYCOPYRROLATE 0.2 MG/ML
INJECTION INTRAMUSCULAR; INTRAVENOUS AS NEEDED
Status: DISCONTINUED | OUTPATIENT
Start: 2024-05-08 | End: 2024-05-08

## 2024-05-08 RX ORDER — OXYCODONE HYDROCHLORIDE 5 MG/1
5 TABLET ORAL EVERY 4 HOURS PRN
Status: DISCONTINUED | OUTPATIENT
Start: 2024-05-08 | End: 2024-05-08 | Stop reason: HOSPADM

## 2024-05-08 RX ORDER — ONDANSETRON HYDROCHLORIDE 2 MG/ML
4 INJECTION, SOLUTION INTRAVENOUS ONCE AS NEEDED
Status: DISCONTINUED | OUTPATIENT
Start: 2024-05-08 | End: 2024-05-08 | Stop reason: HOSPADM

## 2024-05-08 RX ORDER — DIPHENHYDRAMINE HYDROCHLORIDE 50 MG/ML
12.5 INJECTION INTRAMUSCULAR; INTRAVENOUS ONCE AS NEEDED
Status: DISCONTINUED | OUTPATIENT
Start: 2024-05-08 | End: 2024-05-08 | Stop reason: HOSPADM

## 2024-05-08 RX ORDER — OXYCODONE HCL 5 MG/5 ML
10 SOLUTION, ORAL ORAL EVERY 4 HOURS PRN
Status: DISCONTINUED | OUTPATIENT
Start: 2024-05-08 | End: 2024-05-08

## 2024-05-08 RX ORDER — HYDROMORPHONE HYDROCHLORIDE 1 MG/ML
1 INJECTION, SOLUTION INTRAMUSCULAR; INTRAVENOUS; SUBCUTANEOUS EVERY 5 MIN PRN
Status: DISCONTINUED | OUTPATIENT
Start: 2024-05-08 | End: 2024-05-08 | Stop reason: HOSPADM

## 2024-05-08 RX ORDER — MEPERIDINE HYDROCHLORIDE 25 MG/ML
12.5 INJECTION INTRAMUSCULAR; INTRAVENOUS; SUBCUTANEOUS EVERY 10 MIN PRN
Status: DISCONTINUED | OUTPATIENT
Start: 2024-05-08 | End: 2024-05-08 | Stop reason: HOSPADM

## 2024-05-08 RX ORDER — ONDANSETRON HYDROCHLORIDE 2 MG/ML
4 INJECTION, SOLUTION INTRAVENOUS EVERY 8 HOURS PRN
Status: DISCONTINUED | OUTPATIENT
Start: 2024-05-08 | End: 2024-05-09 | Stop reason: HOSPADM

## 2024-05-08 RX ORDER — ACETAMINOPHEN 10 MG/ML
1000 INJECTION, SOLUTION INTRAVENOUS EVERY 6 HOURS SCHEDULED
Status: DISCONTINUED | OUTPATIENT
Start: 2024-05-08 | End: 2024-05-09 | Stop reason: HOSPADM

## 2024-05-08 RX ORDER — METOCLOPRAMIDE HYDROCHLORIDE 5 MG/ML
10 INJECTION INTRAMUSCULAR; INTRAVENOUS EVERY 6 HOURS PRN
Status: DISCONTINUED | OUTPATIENT
Start: 2024-05-08 | End: 2024-05-09 | Stop reason: HOSPADM

## 2024-05-08 RX ORDER — LIDOCAINE HCL/PF 100 MG/5ML
SYRINGE (ML) INTRAVENOUS AS NEEDED
Status: DISCONTINUED | OUTPATIENT
Start: 2024-05-08 | End: 2024-05-08

## 2024-05-08 RX ORDER — SODIUM CHLORIDE, SODIUM LACTATE, POTASSIUM CHLORIDE, CALCIUM CHLORIDE 600; 310; 30; 20 MG/100ML; MG/100ML; MG/100ML; MG/100ML
100 INJECTION, SOLUTION INTRAVENOUS CONTINUOUS
Status: DISCONTINUED | OUTPATIENT
Start: 2024-05-08 | End: 2024-05-08 | Stop reason: HOSPADM

## 2024-05-08 RX ORDER — ACETAMINOPHEN 325 MG/1
650 TABLET ORAL EVERY 4 HOURS PRN
Status: DISCONTINUED | OUTPATIENT
Start: 2024-05-08 | End: 2024-05-08 | Stop reason: HOSPADM

## 2024-05-08 RX ORDER — PANTOPRAZOLE SODIUM 40 MG/1
40 TABLET, DELAYED RELEASE ORAL
Status: DISCONTINUED | OUTPATIENT
Start: 2024-05-09 | End: 2024-05-09 | Stop reason: HOSPADM

## 2024-05-08 RX ORDER — CEFAZOLIN SODIUM 2 G/100ML
2 INJECTION, SOLUTION INTRAVENOUS ONCE
Status: COMPLETED | OUTPATIENT
Start: 2024-05-08 | End: 2024-05-08

## 2024-05-08 RX ORDER — MEPERIDINE HYDROCHLORIDE 25 MG/ML
INJECTION INTRAMUSCULAR; INTRAVENOUS; SUBCUTANEOUS AS NEEDED
Status: DISCONTINUED | OUTPATIENT
Start: 2024-05-08 | End: 2024-05-08

## 2024-05-08 RX ORDER — HYDRALAZINE HYDROCHLORIDE 20 MG/ML
5 INJECTION INTRAMUSCULAR; INTRAVENOUS EVERY 30 MIN PRN
Status: DISCONTINUED | OUTPATIENT
Start: 2024-05-08 | End: 2024-05-08 | Stop reason: HOSPADM

## 2024-05-08 RX ORDER — LABETALOL HYDROCHLORIDE 5 MG/ML
5 INJECTION, SOLUTION INTRAVENOUS ONCE AS NEEDED
Status: DISCONTINUED | OUTPATIENT
Start: 2024-05-08 | End: 2024-05-08 | Stop reason: HOSPADM

## 2024-05-08 RX ORDER — FENTANYL CITRATE 50 UG/ML
INJECTION, SOLUTION INTRAMUSCULAR; INTRAVENOUS
Status: DISPENSED
Start: 2024-05-08 | End: 2024-05-08

## 2024-05-08 RX ORDER — HYDROXYZINE HYDROCHLORIDE 25 MG/1
50 TABLET, FILM COATED ORAL NIGHTLY
Status: DISCONTINUED | OUTPATIENT
Start: 2024-05-08 | End: 2024-05-09 | Stop reason: HOSPADM

## 2024-05-08 RX ORDER — ONDANSETRON HYDROCHLORIDE 2 MG/ML
INJECTION, SOLUTION INTRAVENOUS AS NEEDED
Status: DISCONTINUED | OUTPATIENT
Start: 2024-05-08 | End: 2024-05-08

## 2024-05-08 RX ORDER — HEPARIN SODIUM 5000 [USP'U]/ML
5000 INJECTION, SOLUTION INTRAVENOUS; SUBCUTANEOUS EVERY 8 HOURS
Status: DISCONTINUED | OUTPATIENT
Start: 2024-05-08 | End: 2024-05-09 | Stop reason: HOSPADM

## 2024-05-08 RX ORDER — FENTANYL CITRATE 50 UG/ML
50 INJECTION, SOLUTION INTRAMUSCULAR; INTRAVENOUS EVERY 5 MIN PRN
Status: DISCONTINUED | OUTPATIENT
Start: 2024-05-08 | End: 2024-05-08 | Stop reason: HOSPADM

## 2024-05-08 RX ORDER — LIDOCAINE HYDROCHLORIDE 10 MG/ML
0.1 INJECTION INFILTRATION; PERINEURAL ONCE
Status: DISCONTINUED | OUTPATIENT
Start: 2024-05-08 | End: 2024-05-08 | Stop reason: HOSPADM

## 2024-05-08 RX ORDER — HEPARIN SODIUM 5000 [USP'U]/ML
5000 INJECTION, SOLUTION INTRAVENOUS; SUBCUTANEOUS ONCE
Status: COMPLETED | OUTPATIENT
Start: 2024-05-08 | End: 2024-05-08

## 2024-05-08 RX ORDER — FENTANYL CITRATE 50 UG/ML
INJECTION, SOLUTION INTRAMUSCULAR; INTRAVENOUS AS NEEDED
Status: DISCONTINUED | OUTPATIENT
Start: 2024-05-08 | End: 2024-05-08

## 2024-05-08 RX ORDER — METOCLOPRAMIDE 10 MG/1
10 TABLET ORAL EVERY 6 HOURS PRN
Status: DISCONTINUED | OUTPATIENT
Start: 2024-05-08 | End: 2024-05-09 | Stop reason: HOSPADM

## 2024-05-08 RX ORDER — OXYCODONE HCL 5 MG/5 ML
5 SOLUTION, ORAL ORAL EVERY 4 HOURS PRN
Status: DISCONTINUED | OUTPATIENT
Start: 2024-05-08 | End: 2024-05-09 | Stop reason: HOSPADM

## 2024-05-08 RX ORDER — ACETAMINOPHEN 10 MG/ML
1000 INJECTION, SOLUTION INTRAVENOUS EVERY 6 HOURS SCHEDULED
Status: DISCONTINUED | OUTPATIENT
Start: 2024-05-08 | End: 2024-05-08

## 2024-05-08 RX ORDER — INDOCYANINE GREEN AND WATER 25 MG
2.5 KIT INJECTION ONCE
Status: COMPLETED | OUTPATIENT
Start: 2024-05-08 | End: 2024-05-08

## 2024-05-08 RX ORDER — ROCURONIUM BROMIDE 10 MG/ML
INJECTION, SOLUTION INTRAVENOUS AS NEEDED
Status: DISCONTINUED | OUTPATIENT
Start: 2024-05-08 | End: 2024-05-08

## 2024-05-08 RX ORDER — SCOLOPAMINE TRANSDERMAL SYSTEM 1 MG/1
1 PATCH, EXTENDED RELEASE TRANSDERMAL ONCE
Status: DISCONTINUED | OUTPATIENT
Start: 2024-05-08 | End: 2024-05-09 | Stop reason: HOSPADM

## 2024-05-08 RX ORDER — PROPOFOL 10 MG/ML
INJECTION, EMULSION INTRAVENOUS AS NEEDED
Status: DISCONTINUED | OUTPATIENT
Start: 2024-05-08 | End: 2024-05-08

## 2024-05-08 RX ORDER — ONDANSETRON 4 MG/1
4 TABLET, FILM COATED ORAL EVERY 8 HOURS PRN
Status: DISCONTINUED | OUTPATIENT
Start: 2024-05-08 | End: 2024-05-09 | Stop reason: HOSPADM

## 2024-05-08 RX ADMIN — DEXAMETHASONE SODIUM PHOSPHATE 4 MG: 4 INJECTION, SOLUTION INTRAMUSCULAR; INTRAVENOUS at 08:58

## 2024-05-08 RX ADMIN — HYDROMORPHONE HYDROCHLORIDE 0.5 MG: 1 INJECTION, SOLUTION INTRAMUSCULAR; INTRAVENOUS; SUBCUTANEOUS at 12:52

## 2024-05-08 RX ADMIN — HEPARIN SODIUM 5000 UNITS: 5000 INJECTION INTRAVENOUS; SUBCUTANEOUS at 08:18

## 2024-05-08 RX ADMIN — SUGAMMADEX 200 MG: 100 INJECTION, SOLUTION INTRAVENOUS at 11:11

## 2024-05-08 RX ADMIN — CEFAZOLIN SODIUM 2 G: 2 INJECTION, SOLUTION INTRAVENOUS at 09:02

## 2024-05-08 RX ADMIN — ONDANSETRON 4 MG: 2 INJECTION INTRAMUSCULAR; INTRAVENOUS at 08:50

## 2024-05-08 RX ADMIN — PROPOFOL 200 MG: 10 INJECTION, EMULSION INTRAVENOUS at 08:53

## 2024-05-08 RX ADMIN — HYDROXYZINE HYDROCHLORIDE 50 MG: 25 TABLET ORAL at 22:16

## 2024-05-08 RX ADMIN — KETOROLAC TROMETHAMINE 30 MG: 30 INJECTION, SOLUTION INTRAMUSCULAR at 10:46

## 2024-05-08 RX ADMIN — FENTANYL CITRATE 50 MCG: 50 INJECTION, SOLUTION INTRAMUSCULAR; INTRAVENOUS at 08:53

## 2024-05-08 RX ADMIN — FENTANYL CITRATE 50 MCG: 50 INJECTION, SOLUTION INTRAMUSCULAR; INTRAVENOUS at 10:17

## 2024-05-08 RX ADMIN — ACETAMINOPHEN 1000 MG: 10 INJECTION INTRAVENOUS at 18:40

## 2024-05-08 RX ADMIN — OXYCODONE HYDROCHLORIDE 10 MG: 5 SOLUTION ORAL at 16:57

## 2024-05-08 RX ADMIN — OXYCODONE HYDROCHLORIDE 10 MG: 5 SOLUTION ORAL at 21:35

## 2024-05-08 RX ADMIN — SODIUM CHLORIDE, POTASSIUM CHLORIDE, SODIUM LACTATE AND CALCIUM CHLORIDE 150 ML/HR: 600; 310; 30; 20 INJECTION, SOLUTION INTRAVENOUS at 08:20

## 2024-05-08 RX ADMIN — INDOCYANINE GREEN AND WATER 2.5 MG: KIT at 08:31

## 2024-05-08 RX ADMIN — GABAPENTIN 600 MG: 300 CAPSULE ORAL at 08:19

## 2024-05-08 RX ADMIN — SODIUM CHLORIDE, SODIUM LACTATE, POTASSIUM CHLORIDE, AND CALCIUM CHLORIDE: .6; .31; .03; .02 INJECTION, SOLUTION INTRAVENOUS at 08:44

## 2024-05-08 RX ADMIN — SCOPOLAMINE 1 PATCH: 1.5 PATCH, EXTENDED RELEASE TRANSDERMAL at 08:18

## 2024-05-08 RX ADMIN — HEPARIN SODIUM 5000 UNITS: 5000 INJECTION INTRAVENOUS; SUBCUTANEOUS at 22:16

## 2024-05-08 RX ADMIN — MEPERIDINE HYDROCHLORIDE 25 MG: 25 INJECTION INTRAMUSCULAR; INTRAVENOUS; SUBCUTANEOUS at 11:00

## 2024-05-08 RX ADMIN — ROCURONIUM BROMIDE 50 MG: 50 INJECTION, SOLUTION INTRAVENOUS at 08:53

## 2024-05-08 RX ADMIN — HYDROMORPHONE HYDROCHLORIDE 0.5 MG: 1 INJECTION, SOLUTION INTRAMUSCULAR; INTRAVENOUS; SUBCUTANEOUS at 14:07

## 2024-05-08 RX ADMIN — LIDOCAINE HYDROCHLORIDE 3 ML: 40 INJECTION, SOLUTION RETROBULBAR; TOPICAL at 08:55

## 2024-05-08 RX ADMIN — LIDOCAINE HYDROCHLORIDE 50 MG: 20 INJECTION INTRAVENOUS at 08:53

## 2024-05-08 RX ADMIN — ONDANSETRON 4 MG: 2 INJECTION INTRAMUSCULAR; INTRAVENOUS at 22:24

## 2024-05-08 RX ADMIN — ROCURONIUM BROMIDE 30 MG: 50 INJECTION, SOLUTION INTRAVENOUS at 10:12

## 2024-05-08 RX ADMIN — GLYCOPYRROLATE 0.2 MG: 0.2 INJECTION, SOLUTION INTRAMUSCULAR; INTRAVENOUS at 09:07

## 2024-05-08 SDOH — SOCIAL STABILITY: SOCIAL INSECURITY: HAVE YOU HAD ANY THOUGHTS OF HARMING ANYONE ELSE?: NO

## 2024-05-08 SDOH — SOCIAL STABILITY: SOCIAL INSECURITY: WERE YOU ABLE TO COMPLETE ALL THE BEHAVIORAL HEALTH SCREENINGS?: YES

## 2024-05-08 SDOH — SOCIAL STABILITY: SOCIAL INSECURITY: HAS ANYONE EVER THREATENED TO HURT YOUR FAMILY OR YOUR PETS?: NO

## 2024-05-08 SDOH — SOCIAL STABILITY: SOCIAL INSECURITY: DOES ANYONE TRY TO KEEP YOU FROM HAVING/CONTACTING OTHER FRIENDS OR DOING THINGS OUTSIDE YOUR HOME?: NO

## 2024-05-08 SDOH — SOCIAL STABILITY: SOCIAL INSECURITY: ARE YOU OR HAVE YOU BEEN THREATENED OR ABUSED PHYSICALLY, EMOTIONALLY, OR SEXUALLY BY ANYONE?: NO

## 2024-05-08 SDOH — SOCIAL STABILITY: SOCIAL INSECURITY: DO YOU FEEL UNSAFE GOING BACK TO THE PLACE WHERE YOU ARE LIVING?: NO

## 2024-05-08 SDOH — SOCIAL STABILITY: SOCIAL INSECURITY: ARE THERE ANY APPARENT SIGNS OF INJURIES/BEHAVIORS THAT COULD BE RELATED TO ABUSE/NEGLECT?: NO

## 2024-05-08 SDOH — SOCIAL STABILITY: SOCIAL INSECURITY: HAVE YOU HAD THOUGHTS OF HARMING ANYONE ELSE?: NO

## 2024-05-08 SDOH — SOCIAL STABILITY: SOCIAL INSECURITY: ABUSE: ADULT

## 2024-05-08 SDOH — HEALTH STABILITY: MENTAL HEALTH: CURRENT SMOKER: 0

## 2024-05-08 SDOH — SOCIAL STABILITY: SOCIAL INSECURITY: DO YOU FEEL ANYONE HAS EXPLOITED OR TAKEN ADVANTAGE OF YOU FINANCIALLY OR OF YOUR PERSONAL PROPERTY?: NO

## 2024-05-08 ASSESSMENT — COGNITIVE AND FUNCTIONAL STATUS - GENERAL
PATIENT BASELINE BEDBOUND: NO
MOVING TO AND FROM BED TO CHAIR: A LITTLE
STANDING UP FROM CHAIR USING ARMS: A LITTLE
DAILY ACTIVITIY SCORE: 23
TOILETING: A LITTLE
TURNING FROM BACK TO SIDE WHILE IN FLAT BAD: A LITTLE
MOBILITY SCORE: 18
MOVING FROM LYING ON BACK TO SITTING ON SIDE OF FLAT BED WITH BEDRAILS: A LITTLE
WALKING IN HOSPITAL ROOM: A LITTLE
CLIMB 3 TO 5 STEPS WITH RAILING: A LITTLE

## 2024-05-08 ASSESSMENT — PAIN SCALES - GENERAL
PAINLEVEL_OUTOF10: 8
PAINLEVEL_OUTOF10: 5 - MODERATE PAIN
PAINLEVEL_OUTOF10: 4
PAINLEVEL_OUTOF10: 0 - NO PAIN
PAINLEVEL_OUTOF10: 5 - MODERATE PAIN
PAINLEVEL_OUTOF10: 8
PAINLEVEL_OUTOF10: 0 - NO PAIN
PAINLEVEL_OUTOF10: 5 - MODERATE PAIN
PAINLEVEL_OUTOF10: 7
PAINLEVEL_OUTOF10: 8
PAINLEVEL_OUTOF10: 0 - NO PAIN
PAINLEVEL_OUTOF10: 4

## 2024-05-08 ASSESSMENT — PAIN DESCRIPTION - ORIENTATION
ORIENTATION: RIGHT;LEFT
ORIENTATION: MID

## 2024-05-08 ASSESSMENT — ACTIVITIES OF DAILY LIVING (ADL)
GROOMING: INDEPENDENT
PATIENT'S MEMORY ADEQUATE TO SAFELY COMPLETE DAILY ACTIVITIES?: YES
TOILETING: NEEDS ASSISTANCE
HEARING - RIGHT EAR: FUNCTIONAL
ADEQUATE_TO_COMPLETE_ADL: YES
WALKS IN HOME: INDEPENDENT
HEARING - LEFT EAR: FUNCTIONAL
LACK_OF_TRANSPORTATION: NO
JUDGMENT_ADEQUATE_SAFELY_COMPLETE_DAILY_ACTIVITIES: YES
FEEDING YOURSELF: INDEPENDENT
DRESSING YOURSELF: INDEPENDENT
BATHING: INDEPENDENT

## 2024-05-08 ASSESSMENT — LIFESTYLE VARIABLES
SKIP TO QUESTIONS 9-10: 1
AUDIT-C TOTAL SCORE: 0
HOW OFTEN DO YOU HAVE A DRINK CONTAINING ALCOHOL: NEVER
HOW MANY STANDARD DRINKS CONTAINING ALCOHOL DO YOU HAVE ON A TYPICAL DAY: PATIENT DOES NOT DRINK
HOW OFTEN DO YOU HAVE 6 OR MORE DRINKS ON ONE OCCASION: NEVER
AUDIT-C TOTAL SCORE: 0

## 2024-05-08 ASSESSMENT — PAIN DESCRIPTION - DESCRIPTORS
DESCRIPTORS: SHARP
DESCRIPTORS: ACHING

## 2024-05-08 ASSESSMENT — PAIN SCALES - PAIN ASSESSMENT IN ADVANCED DEMENTIA (PAINAD)
FACIALEXPRESSION: SMILING OR INEXPRESSIVE
TOTALSCORE: COLD APPLIED;COLD PACK
BREATHING: NORMAL
TOTALSCORE: 1
CONSOLABILITY: DISTRACTED OR REASSURED BY VOICE/TOUCH
BODYLANGUAGE: RELAXED

## 2024-05-08 ASSESSMENT — PAIN - FUNCTIONAL ASSESSMENT
PAIN_FUNCTIONAL_ASSESSMENT: 0-10

## 2024-05-08 ASSESSMENT — PAIN DESCRIPTION - LOCATION
LOCATION: ABDOMEN
LOCATION: ABDOMEN

## 2024-05-08 ASSESSMENT — PATIENT HEALTH QUESTIONNAIRE - PHQ9
1. LITTLE INTEREST OR PLEASURE IN DOING THINGS: NOT AT ALL
2. FEELING DOWN, DEPRESSED OR HOPELESS: NOT AT ALL
SUM OF ALL RESPONSES TO PHQ9 QUESTIONS 1 & 2: 0

## 2024-05-08 ASSESSMENT — PAIN SCALES - WONG BAKER
WONGBAKER_NUMERICALRESPONSE: HURTS LITTLE MORE
WONGBAKER_NUMERICALRESPONSE: HURTS EVEN MORE

## 2024-05-08 NOTE — ANESTHESIA PREPROCEDURE EVALUATION
Patient: Cecelia Phillips    Procedure Information       Date/Time: 05/08/24 0900    Procedure: ROBOTIC ASSISTED LAPAROSCOPIC CHOLECYSTECTOMY/ POSSIBLE OPEN  **KEEP CASE LATEX FREE**    Location: [facility] OR 09 / [facility]    Surgeons: Stella Gan MD            Relevant Problems   Anesthesia   (+) Post-operative nausea and vomiting      Neuro   (+) Anxiety and depression   (+) Moderate episode of recurrent major depressive disorder (Multi)      GI   (+) Dysphagia   (+) Gastroesophageal reflux disease   (+) Hiatal hernia with gastroesophageal reflux      Liver   (+) Gallbladder sludge      Endocrine   (+) Goiter      Hematology   (+) Iron deficiency anemia      ID   (+) Candidiasis of vagina      GYN   (+) PCOS (polycystic ovarian syndrome)       Clinical information reviewed:   Tobacco  Allergies  Meds   Med Hx  Surg Hx  OB Status  Fam Hx          NPO Detail:  NPO/Void Status  Carbohydrate Drink Given Prior to Surgery? : N  Date of Last Liquid: 05/07/24  Time of Last Liquid: 2100  Date of Last Solid: 05/07/24  Time of Last Solid: 2100  Last Intake Type: Light meal         Physical Exam    Airway  Mallampati: II  TM distance: >3 FB  Neck ROM: full     Cardiovascular - normal exam     Dental - normal exam     Pulmonary - normal exam     Abdominal            Anesthesia Plan    History of general anesthesia?: yes  History of complications of general anesthesia?: yes    ASA 2     general     The patient is not a current smoker.    intravenous induction   Postoperative administration of opioids is intended.  Trial extubation is planned.  Anesthetic plan and risks discussed with patient.  Use of blood products discussed with patient who consented to blood products.    Plan discussed with CAA.

## 2024-05-08 NOTE — OP NOTE
ROBOTIC ASSISTED LAPAROSCOPIC CHOLECYSTECTOMY/ POSSIBLE OPEN  **KEEP CASE LATEX FREE** Operative Note     Date: 2024  OR Location: [facility]    Name: Cecelia Phillips, : 1995, Age: 29 y.o., MRN: [id number], Sex: female    Diagnosis  Pre-op Diagnosis     * Biliary colic [K80.50] Post-op Diagnosis     * Biliary colic [K80.50]     Procedures  ROBOTIC ASSISTED LAPAROSCOPIC CHOLECYSTECTOMY/ POSSIBLE OPEN  **KEEP CASE LATEX FREE**  05749 - [place] LAPAROSCOPY SURG CHOLECYSTECTOMY    [place] EGD TRANSORAL BIOPSY SINGLE/MULTIPLE [20724]  [place] LAPS ABD PRTM&OMENTUM DX W/WO SPEC BR/WA SPX [27185]  Surgeons      * Stella Gan - Primary    Resident/Fellow/Other Assistant:  Surgeons and Role:     * Scot Chirinos MD - Assisting    Procedure Summary  Anesthesia: General  ASA: II  Anesthesia Staff: Anesthesiologist: Hansel Brock MD  C-AA: ALFRED Peñaloza  Estimated Blood Loss: 10 mL  Intra-op Medications:   Administrations occurring from 0900 to 1050 on 24:   Medication Name Total Dose   bupivacaine PF (Marcaine) 0.25 % (2.5 mg/mL) injection 20 mL   bupivacaine PF (Marcaine) 0.25 % (2.5 mg/mL) injection 20 mL   cloNIDine PF (Duraclon) injection 200 mcg   ceFAZolin in dextrose (iso-os) (Ancef) IVPB 2 g 2 g              Anesthesia Record               Intraprocedure I/O Totals          Intake    LR bolus 1000.00 mL    Total Intake 1000 mL       Output    Est. Blood Loss 50 mL    Total Output 50 mL       Net    Net Volume 950 mL          Specimen:   ID Type Source Tests Collected by Time   1 : UMBILICAL KELOID Tissue SOFT TISSUE BIOPSY SURGICAL PATHOLOGY EXAM Stella Gan MD 2024 0939   2 : PORTION OF SMALL BOWEL Tissue SMALL BOWEL/INTESTINE BIOPSY SURGICAL PATHOLOGY EXAM Stella Gan MD 2024 1020   3 : GALL BLADDER Tissue GALLBLADDER CHOLECYSTECTOMY SURGICAL PATHOLOGY EXAM Stella Gan MD 2024 1043        Staff:   Circulator: Rosie Pool RN  Relief Circulator: Leelee Cobb RN  Scrub Person:  Soham Mcgovern         Drains and/or Catheters: * None in log *    Tourniquet Times:         Implants:     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 female who is having surgery for Biliary colic [K80.50]. With nausea and vomiting and PO intolerance with concern for biliary dyskinesia     The patient was seen in the preoperative area. The risks, benefits, complications, treatment options, non-operative alternatives, expected recovery and outcomes were discussed with the patient. The possibilities of reaction to medication, pulmonary aspiration, injury to surrounding structures, bleeding, recurrent infection, the need for additional procedures, failure to diagnose a condition, and creating a complication requiring transfusion or operation were discussed with the patient. The patient concurred with the proposed plan, giving informed consent.  The site of surgery was properly noted/marked if necessary per policy. The patient has been actively warmed in preoperative area. Preoperative antibiotics have been ordered and given within 1 hours of incision. Venous thrombosis prophylaxis have been ordered including bilateral sequential compression devices    Procedure Details:   Complications:  None; patient tolerated the procedure well.    Disposition: PACU - hemodynamically stable.  Condition: stable         Additional Details: EGD, bilateral BRUCE blocks all specimens are removed     Attending Attestation: I was present and scrubbed for the entire procedure.    Stella Gan  Phone Number: [phone]

## 2024-05-08 NOTE — INTERVAL H&P NOTE
H&P reviewed. The patient was examined and there are no changes to the H&P.  Cholecystectomy for biliary dyskinesia

## 2024-05-08 NOTE — ANESTHESIA POSTPROCEDURE EVALUATION
Patient: Cecelia Phillips    Procedure Summary       Date: 05/08/24 Room / Location: [facility] OR 09 / [facility]    Anesthesia Start: 0847 Anesthesia Stop: 1118    Procedure: ROBOTIC ASSISTED LAPAROSCOPIC CHOLECYSTECTOMY/ SMALL BOWEL RESECTION / CLOSURE OF INTERNAL PERINEUM / TAP BLOCK (Abdomen) Diagnosis:       Biliary colic      (Biliary colic [K80.50])    Surgeons: Stella Gan MD Responsible Provider: Hansel Brock MD    Anesthesia Type: general ASA Status: 2            Anesthesia Type: general    Vitals Value Taken Time   /61 05/08/24 1118   Temp 36.5 05/08/24 1118   Pulse 86 05/08/24 1118   Resp 14 05/08/24 1118   SpO2 100 05/08/24 1118       Anesthesia Post Evaluation    Patient participation: complete - patient participated  Level of consciousness: awake  Pain management: adequate  Airway patency: patent  Cardiovascular status: acceptable  Respiratory status: acceptable  Hydration status: acceptable  Postoperative Nausea and Vomiting: none        No notable events documented.

## 2024-05-08 NOTE — ANESTHESIA PROCEDURE NOTES
Airway  Date/Time: 5/8/2024 8:55 AM  Urgency: elective    Airway not difficult    Staffing  Performed: CAA   Authorized by: Hansel Brock MD    Performed by: ALFRED Peñaloza  Patient location during procedure: OR    Indications and Patient Condition  Indications for airway management: anesthesia and airway protection  Spontaneous ventilation: present  Sedation level: deep  Preoxygenated: yes  MILS maintained throughout  Mask difficulty assessment: 0 - not attempted    Final Airway Details  Final airway type: endotracheal airway      Successful airway: ETT  Cuffed: yes   Successful intubation technique: direct laryngoscopy  Facilitating devices/methods: cricoid pressure  Endotracheal tube insertion site: oral  Blade: Emmett  Blade size: #4  ETT size (mm): 7.0  Cormack-Lehane Classification: grade I - full view of glottis  Placement verified by: chest auscultation and capnometry   Measured from: teeth  ETT to teeth (cm): 19  Number of attempts at approach: 1  Number of other approaches attempted: 0    Additional Comments  DL X 1 by Derrick, Grade 2 view, LTA used.  DL X 1 by paramedic studentAmy.  ETT placed.  Mild trauma to upper airway.

## 2024-05-08 NOTE — CARE PLAN
Problem: Pain - Adult  Goal: Verbalizes/displays adequate comfort level or baseline comfort level  5/8/2024 1454 by Lindsey Veronica RN  Outcome: Progressing  Flowsheets (Taken 5/8/2024 1454)  Verbalizes/displays adequate comfort level or baseline comfort level: Encourage patient to monitor pain and request assistance  5/8/2024 1453 by Lindsey Veronica RN  Outcome: Progressing

## 2024-05-09 ENCOUNTER — APPOINTMENT (OUTPATIENT)
Dept: SURGERY | Facility: CLINIC | Age: 29
End: 2024-05-09
Payer: COMMERCIAL

## 2024-05-09 VITALS
SYSTOLIC BLOOD PRESSURE: 121 MMHG | OXYGEN SATURATION: 99 % | WEIGHT: 222.66 LBS | RESPIRATION RATE: 16 BRPM | DIASTOLIC BLOOD PRESSURE: 73 MMHG | TEMPERATURE: 97.9 F | HEART RATE: 79 BPM | BODY MASS INDEX: 35.79 KG/M2 | HEIGHT: 66 IN

## 2024-05-09 LAB
ALBUMIN SERPL BCP-MCNC: 3.2 G/DL (ref 3.4–5)
ALP SERPL-CCNC: 71 U/L (ref 33–110)
ALT SERPL W P-5'-P-CCNC: 35 U/L (ref 7–45)
ANION GAP SERPL CALC-SCNC: 9 MMOL/L (ref 10–20)
AST SERPL W P-5'-P-CCNC: 34 U/L (ref 9–39)
BASOPHILS # BLD AUTO: 0.03 X10*3/UL (ref 0–0.1)
BASOPHILS NFR BLD AUTO: 0.3 %
BILIRUB SERPL-MCNC: 0.3 MG/DL (ref 0–1.2)
BUN SERPL-MCNC: 6 MG/DL (ref 6–23)
CALCIUM SERPL-MCNC: 8.4 MG/DL (ref 8.6–10.3)
CHLORIDE SERPL-SCNC: 103 MMOL/L (ref 98–107)
CO2 SERPL-SCNC: 28 MMOL/L (ref 21–32)
CREAT SERPL-MCNC: 0.61 MG/DL (ref 0.5–1.05)
EGFRCR SERPLBLD CKD-EPI 2021: >90 ML/MIN/1.73M*2
EOSINOPHIL # BLD AUTO: 0.04 X10*3/UL (ref 0–0.7)
EOSINOPHIL NFR BLD AUTO: 0.4 %
ERYTHROCYTE [DISTWIDTH] IN BLOOD BY AUTOMATED COUNT: 15.6 % (ref 11.5–14.5)
ERYTHROCYTE [DISTWIDTH] IN BLOOD BY AUTOMATED COUNT: 15.9 % (ref 11.5–14.5)
GLUCOSE SERPL-MCNC: 95 MG/DL (ref 74–99)
HCT VFR BLD AUTO: 30.6 % (ref 36–46)
HCT VFR BLD AUTO: 32.1 % (ref 36–46)
HGB BLD-MCNC: 10 G/DL (ref 12–16)
HGB BLD-MCNC: 10.1 G/DL (ref 12–16)
IMM GRANULOCYTES # BLD AUTO: 0.02 X10*3/UL (ref 0–0.7)
IMM GRANULOCYTES NFR BLD AUTO: 0.2 % (ref 0–0.9)
LYMPHOCYTES # BLD AUTO: 3.26 X10*3/UL (ref 1.2–4.8)
LYMPHOCYTES NFR BLD AUTO: 30.9 %
MCH RBC QN AUTO: 24.8 PG (ref 26–34)
MCH RBC QN AUTO: 25.4 PG (ref 26–34)
MCHC RBC AUTO-ENTMCNC: 31.5 G/DL (ref 32–36)
MCHC RBC AUTO-ENTMCNC: 32.7 G/DL (ref 32–36)
MCV RBC AUTO: 78 FL (ref 80–100)
MCV RBC AUTO: 79 FL (ref 80–100)
MONOCYTES # BLD AUTO: 0.91 X10*3/UL (ref 0.1–1)
MONOCYTES NFR BLD AUTO: 8.6 %
NEUTROPHILS # BLD AUTO: 6.28 X10*3/UL (ref 1.2–7.7)
NEUTROPHILS NFR BLD AUTO: 59.6 %
NRBC BLD-RTO: 0 /100 WBCS (ref 0–0)
NRBC BLD-RTO: 0 /100 WBCS (ref 0–0)
PLATELET # BLD AUTO: 139 X10*3/UL (ref 150–450)
PLATELET # BLD AUTO: 153 X10*3/UL (ref 150–450)
POTASSIUM SERPL-SCNC: 3.6 MMOL/L (ref 3.5–5.3)
PROT SERPL-MCNC: 6 G/DL (ref 6.4–8.2)
RBC # BLD AUTO: 3.94 X10*6/UL (ref 4–5.2)
RBC # BLD AUTO: 4.08 X10*6/UL (ref 4–5.2)
SODIUM SERPL-SCNC: 136 MMOL/L (ref 136–145)
WBC # BLD AUTO: 10.5 X10*3/UL (ref 4.4–11.3)
WBC # BLD AUTO: 9.6 X10*3/UL (ref 4.4–11.3)

## 2024-05-09 PROCEDURE — 1100000001 HC PRIVATE ROOM DAILY

## 2024-05-09 PROCEDURE — 85027 COMPLETE CBC AUTOMATED: CPT | Performed by: STUDENT IN AN ORGANIZED HEALTH CARE EDUCATION/TRAINING PROGRAM

## 2024-05-09 PROCEDURE — 96372 THER/PROPH/DIAG INJ SC/IM: CPT | Performed by: STUDENT IN AN ORGANIZED HEALTH CARE EDUCATION/TRAINING PROGRAM

## 2024-05-09 PROCEDURE — 36415 COLL VENOUS BLD VENIPUNCTURE: CPT | Performed by: STUDENT IN AN ORGANIZED HEALTH CARE EDUCATION/TRAINING PROGRAM

## 2024-05-09 PROCEDURE — 84075 ASSAY ALKALINE PHOSPHATASE: CPT | Performed by: STUDENT IN AN ORGANIZED HEALTH CARE EDUCATION/TRAINING PROGRAM

## 2024-05-09 PROCEDURE — 2500000002 HC RX 250 W HCPCS SELF ADMINISTERED DRUGS (ALT 637 FOR MEDICARE OP, ALT 636 FOR OP/ED): Performed by: STUDENT IN AN ORGANIZED HEALTH CARE EDUCATION/TRAINING PROGRAM

## 2024-05-09 PROCEDURE — 2500000001 HC RX 250 WO HCPCS SELF ADMINISTERED DRUGS (ALT 637 FOR MEDICARE OP): Performed by: STUDENT IN AN ORGANIZED HEALTH CARE EDUCATION/TRAINING PROGRAM

## 2024-05-09 PROCEDURE — 85025 COMPLETE CBC W/AUTO DIFF WBC: CPT | Performed by: STUDENT IN AN ORGANIZED HEALTH CARE EDUCATION/TRAINING PROGRAM

## 2024-05-09 PROCEDURE — 2500000004 HC RX 250 GENERAL PHARMACY W/ HCPCS (ALT 636 FOR OP/ED): Mod: JZ | Performed by: STUDENT IN AN ORGANIZED HEALTH CARE EDUCATION/TRAINING PROGRAM

## 2024-05-09 PROCEDURE — 2500000005 HC RX 250 GENERAL PHARMACY W/O HCPCS: Performed by: SURGERY

## 2024-05-09 RX ORDER — DIPHENHYDRAMINE HCL 12.5MG/5ML
12.5 LIQUID (ML) ORAL EVERY 6 HOURS PRN
Status: DISCONTINUED | OUTPATIENT
Start: 2024-05-09 | End: 2024-05-09 | Stop reason: HOSPADM

## 2024-05-09 RX ORDER — ONDANSETRON 4 MG/1
4 TABLET, ORALLY DISINTEGRATING ORAL EVERY 8 HOURS PRN
Qty: 30 TABLET | Refills: 2 | Status: SHIPPED | OUTPATIENT
Start: 2024-05-09 | End: 2024-06-08

## 2024-05-09 RX ORDER — LIDOCAINE 560 MG/1
1 PATCH PERCUTANEOUS; TOPICAL; TRANSDERMAL DAILY
Status: DISCONTINUED | OUTPATIENT
Start: 2024-05-09 | End: 2024-05-09 | Stop reason: HOSPADM

## 2024-05-09 RX ORDER — OXYCODONE HCL 5 MG/5 ML
5 SOLUTION, ORAL ORAL EVERY 6 HOURS PRN
Qty: 140 ML | Refills: 0 | Status: SHIPPED | OUTPATIENT
Start: 2024-05-09 | End: 2024-05-28 | Stop reason: SDUPTHER

## 2024-05-09 RX ORDER — KETOROLAC TROMETHAMINE 30 MG/ML
15 INJECTION, SOLUTION INTRAMUSCULAR; INTRAVENOUS EVERY 6 HOURS
Status: DISCONTINUED | OUTPATIENT
Start: 2024-05-09 | End: 2024-05-09 | Stop reason: HOSPADM

## 2024-05-09 RX ORDER — SIMETHICONE 80 MG
80 TABLET,CHEWABLE ORAL 4 TIMES DAILY PRN
Status: DISCONTINUED | OUTPATIENT
Start: 2024-05-09 | End: 2024-05-09 | Stop reason: HOSPADM

## 2024-05-09 RX ORDER — ADHESIVE BANDAGE
30 BANDAGE TOPICAL DAILY PRN
Status: DISCONTINUED | OUTPATIENT
Start: 2024-05-09 | End: 2024-05-09 | Stop reason: HOSPADM

## 2024-05-09 RX ORDER — OMEPRAZOLE 40 MG/1
40 CAPSULE, DELAYED RELEASE ORAL
Qty: 30 CAPSULE | Refills: 0 | Status: SHIPPED | OUTPATIENT
Start: 2024-05-09 | End: 2024-06-08

## 2024-05-09 RX ADMIN — PANTOPRAZOLE SODIUM 40 MG: 40 TABLET, DELAYED RELEASE ORAL at 06:08

## 2024-05-09 RX ADMIN — OXYCODONE HYDROCHLORIDE 10 MG: 5 SOLUTION ORAL at 10:03

## 2024-05-09 RX ADMIN — ACETAMINOPHEN 1000 MG: 10 INJECTION INTRAVENOUS at 06:08

## 2024-05-09 RX ADMIN — HEPARIN SODIUM 5000 UNITS: 5000 INJECTION INTRAVENOUS; SUBCUTANEOUS at 12:03

## 2024-05-09 RX ADMIN — KETOROLAC TROMETHAMINE 15 MG: 30 INJECTION, SOLUTION INTRAMUSCULAR at 14:21

## 2024-05-09 RX ADMIN — SIMETHICONE 80 MG: 80 TABLET, CHEWABLE ORAL at 07:11

## 2024-05-09 RX ADMIN — OXYCODONE HYDROCHLORIDE 10 MG: 5 SOLUTION ORAL at 04:08

## 2024-05-09 RX ADMIN — LIDOCAINE 1 PATCH: 4 PATCH TOPICAL at 12:54

## 2024-05-09 RX ADMIN — HYDROMORPHONE HYDROCHLORIDE 0.5 MG: 1 INJECTION, SOLUTION INTRAMUSCULAR; INTRAVENOUS; SUBCUTANEOUS at 06:13

## 2024-05-09 RX ADMIN — HEPARIN SODIUM 5000 UNITS: 5000 INJECTION INTRAVENOUS; SUBCUTANEOUS at 06:08

## 2024-05-09 RX ADMIN — ACETAMINOPHEN 1000 MG: 10 INJECTION INTRAVENOUS at 00:05

## 2024-05-09 RX ADMIN — MAGNESIUM HYDROXIDE 30 ML: 400 SUSPENSION ORAL at 16:39

## 2024-05-09 RX ADMIN — DIPHENHYDRAMINE HYDROCHLORIDE 12.5 MG: 25 SOLUTION ORAL at 14:20

## 2024-05-09 RX ADMIN — ACETAMINOPHEN 1000 MG: 10 INJECTION INTRAVENOUS at 12:03

## 2024-05-09 ASSESSMENT — COGNITIVE AND FUNCTIONAL STATUS - GENERAL
TURNING FROM BACK TO SIDE WHILE IN FLAT BAD: A LITTLE
WALKING IN HOSPITAL ROOM: A LITTLE
DAILY ACTIVITIY SCORE: 23
TOILETING: A LITTLE
CLIMB 3 TO 5 STEPS WITH RAILING: A LITTLE
MOBILITY SCORE: 18
MOVING FROM LYING ON BACK TO SITTING ON SIDE OF FLAT BED WITH BEDRAILS: A LITTLE
STANDING UP FROM CHAIR USING ARMS: A LITTLE
MOVING TO AND FROM BED TO CHAIR: A LITTLE

## 2024-05-09 ASSESSMENT — PAIN SCALES - GENERAL
PAINLEVEL_OUTOF10: 7
PAINLEVEL_OUTOF10: 5 - MODERATE PAIN
PAINLEVEL_OUTOF10: 9
PAINLEVEL_OUTOF10: 9

## 2024-05-09 ASSESSMENT — PAIN - FUNCTIONAL ASSESSMENT
PAIN_FUNCTIONAL_ASSESSMENT: 0-10

## 2024-05-09 ASSESSMENT — ACTIVITIES OF DAILY LIVING (ADL): LACK_OF_TRANSPORTATION: NO

## 2024-05-09 ASSESSMENT — PAIN DESCRIPTION - ORIENTATION: ORIENTATION: RIGHT

## 2024-05-09 NOTE — DISCHARGE SUMMARY
Discharge Diagnosis  Biliary dyskinesia     Issues Requiring Follow-Up      Test Results Pending At Discharge  Pending Labs       Order Current Status    Surgical Pathology Exam In process            Hospital Course   Patient is a 29-year-old female who presented to [place] for an elective cholecystectomy EGD and evaluation of her Sergio-en-Y gastric bypass.  Due to persistent nausea and vomiting and a HIDA scan showing biliary dyskinesia.  The patient underwent a robotic assisted cholecystectomy with lysis of adhesions and resection of a blind in at the JJ anastomosis with closure of the JJ defect and reduction of internal hernia.  The patient also underwent an EGD which was normal appearing.  On postop day 1 the patient was noted to have a stable hemoglobin her pain was well-controlled she was tolerating a regular diet and was discharged in stable condition with follow-up provided.    Pertinent Physical Exam At Time of Discharge  Physical Exam  Constitutional:       General: She is not in acute distress.     Appearance: She is obese. She is not ill-appearing or toxic-appearing.   HENT:      Head: Normocephalic.      Mouth/Throat:      Mouth: Mucous membranes are moist.      Pharynx: Oropharynx is clear.   Eyes:      Extraocular Movements: Extraocular movements intact.   Abdominal:      General: Abdomen is flat. There is no distension.      Palpations: Abdomen is soft.      Tenderness: There is abdominal tenderness. There is no guarding.   Musculoskeletal:         General: Normal range of motion.      Cervical back: Normal range of motion. No rigidity.   Lymphadenopathy:      Cervical: No cervical adenopathy.   Skin:     General: Skin is warm.      Coloration: Skin is not jaundiced.      Findings: No bruising.   Neurological:      General: No focal deficit present.      Mental Status: She is alert.   Psychiatric:         Mood and Affect: Mood normal.         Home Medications     Medication List      START taking these  medications     oxyCODONE 5 mg/5 mL solution; Commonly known as: Roxicodone; Take 5 mL   (5 mg) by mouth every 6 hours if needed for severe pain (7 - 10).     CHANGE how you take these medications     * omeprazole 40 mg DR capsule; Commonly known as: PriLOSEC; What   changed: Another medication with the same name was added. Make sure you   understand how and when to take each.   * omeprazole 40 mg DR capsule; Commonly known as: PriLOSEC; Take 1   capsule (40 mg) by mouth once daily in the morning. Take before meals.   Please remove capsule and use granules and mix with sugar free pudding or   jello; What changed: You were already taking a medication with the same   name, and this prescription was added. Make sure you understand how and   when to take each.   * ondansetron ODT 4 mg disintegrating tablet; Commonly known as:   Zofran-ODT; Take 1-2 tablets (4-8 mg) by mouth every 8 hours if needed for   nausea or vomiting.; What changed: Another medication with the same name   was added. Make sure you understand how and when to take each.   * ondansetron ODT 4 mg disintegrating tablet; Commonly known as:   Zofran-ODT; Take 1 tablet (4 mg) by mouth every 8 hours if needed for   nausea or vomiting.; What changed: You were already taking a medication   with the same name, and this prescription was added. Make sure you   understand how and when to take each.  * This list has 4 medication(s) that are the same as other medications   prescribed for you. Read the directions carefully, and ask your doctor or   other care provider to review them with you.     CONTINUE taking these medications     Adults Multivitamin 18 mg iron-400 mcg-25 mcg tablet; Generic drug:   multivitamin with minerals   calcium carbonate-vitamin D3 1,000 mg-20 mcg (800 unit) tablet   ergocalciferol 1.25 MG (30097 UT) capsule; Commonly known as: Vitamin   D-2   ferrous gluconate 236 mg (27 mg iron) tablet   hydrOXYzine HCL 25 mg tablet; Commonly known as:  Atarax   Mirena 21 mcg/24 hours (8 yrs) 52 mg IUD; Generic drug: levonorgestrel   promethazine 12.5 mg tablet; Commonly known as: Phenergan   ursodiol 250 mg tablet; Commonly known as: ANGELO 250; Take 1 tablet (250   mg) by mouth 2 times a day.   Vitamin B-1 100 mg tablet; Generic drug: thiamine     STOP taking these medications     esomeprazole 40 mg packet; Commonly known as: NexIUM Packet       Outpatient Follow-Up  Future Appointments   Date Time Provider Department Center   5/16/2024  8:45 AM Stella Gan MD [id number] [facility]   10/29/2024 10:00 AM Yennifer Romeo DO [id number] [facility]   4/14/2025 10:00 AM Ashley Cotton MD [id number] [facility]       Scot Chirinos MD

## 2024-05-09 NOTE — PROGRESS NOTES
05/09/24 0906   Discharge Planning   Living Arrangements Parent;Family members   Support Systems Parent   Assistance Needed none   Type of Residence Private residence   Home or Post Acute Services None   Patient expects to be discharged to: home   Does the patient need discharge transport arranged? No   Housing Stability   In the last 12 months, how many places have you lived? 1   Transportation Needs   In the past 12 months, has lack of transportation kept you from medical appointments or from getting medications? no   In the past 12 months, has lack of transportation kept you from meetings, work, or from getting things needed for daily living? No     Due to remote coverage, spoke to the patient via telephone. Per the patient, she lives at home with her mother and grandmother. She is normally independent in her daily activities. She is currently concerned as she is still having pain issues. She is also concerned about taking time off of work due to hospitalization.  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 She plans to go home on discharge, her mother will drive her home.

## 2024-05-09 NOTE — CARE PLAN
The patient's goals for the shift include Pain control    The clinical goals for the shift include Pain control      Problem: Pain - Adult  Goal: Verbalizes/displays adequate comfort level or baseline comfort level  Outcome: Progressing     Problem: Safety - Adult  Goal: Free from fall injury  Outcome: Progressing     Problem: Pain  Goal: Takes deep breaths with improved pain control throughout the shift  Outcome: Progressing     Problem: Pain  Goal: Walks with improved pain control throughout the shift  Outcome: Progressing     Problem: Pain  Goal: Free from opioid side effects throughout the shift  Outcome: Progressing

## 2024-05-09 NOTE — OP NOTE
ROBOTIC ASSISTED LAPAROSCOPIC CHOLECYSTECTOMY/ SMALL BOWEL RESECTION / CLOSURE OF INTERNAL PERINEUM / TAP BLOCK Operative Note     Date: 2024  OR Location: [facility]    Name: Cecelia Phillips, : 1995, Age: 29 y.o., MRN: [id number], Sex: female    Diagnosis  Pre-op Diagnosis     * Biliary colic [K80.50] Post-op Diagnosis     * Biliary colic [K80.50]     Procedures  ROBOTIC ASSISTED LAPAROSCOPIC CHOLECYSTECTOMY/ SMALL BOWEL RESECTION / CLOSURE OF INTERNAL PERINEUM / TAP BLOCK  34215 - OK LAPAROSCOPY SURG CHOLECYSTECTOMY    OK EGD TRANSORAL BIOPSY SINGLE/MULTIPLE [58913]  OK LAPS ABD PRTM&OMENTUM DX W/WO SPEC BR/WA SPX [76393]  Surgeons      * Stella Gan - Primary    Resident/Fellow/Other Assistant:  Surgeons and Role:     * Scot Chirinos MD - Assisting    Procedure Summary  Anesthesia: General  ASA: II  Anesthesia Staff: Anesthesiologist: Hansel Brock MD  C-AA: ALFRED Peñaloza  Estimated Blood Loss: 50 mL  Intra-op Medications: Administrations occurring from 24 1039 to 05/10/24 1039:  * No intraprocedure medications in log *           Anesthesia Record               Intraprocedure I/O Totals          Intake    LR bolus 1000.00 mL    Total Intake 1000 mL       Output    Est. Blood Loss 50 mL    Total Output 50 mL       Net    Net Volume 950 mL          Specimen:   ID Type Source Tests Collected by Time   1 : UMBILICAL KELOID Tissue UMBILICUS RESECTION SURGICAL PATHOLOGY EXAM Stella Gan MD 2024 0939   2 : PORTION OF SMALL BOWEL Tissue SMALL BOWEL /INTESTINE SEGMENTAL RESECTION SURGICAL PATHOLOGY EXAM Stella Gan MD 2024 1020   3 : GALL BLADDER Tissue GALLBLADDER CHOLECYSTECTOMY SURGICAL PATHOLOGY EXAM Stella Gan MD 2024 1043        Staff:   Circulator: Rosie Pool RN  Relief Circulator: Leelee Cobb RN  Scrub Person: Soham Mcgovern         Drains and/or Catheters: * None in log *    Tourniquet Times:         Implants:     Findings: Gallbladder adhesions,  mesenteric hernia, blind biliopancreatic limb and jejunojejunostomy    Indications: Cecelia Phillips is an 29 y.o. female who is having surgery for Biliary colic [K80.50].  Internal hernia    The patient was seen in the preoperative area. The risks, benefits, complications, treatment options, non-operative alternatives, expected recovery and outcomes were discussed with the patient. The possibilities of reaction to medication, pulmonary aspiration, injury to surrounding structures, bleeding, recurrent infection, the need for additional procedures, failure to diagnose a condition, and creating a complication requiring transfusion or operation were discussed with the patient. The patient concurred with the proposed plan, giving informed consent.  The site of surgery was properly noted/marked if necessary per policy. The patient has been actively warmed in preoperative area. Preoperative antibiotics have been ordered and given within 1 hours of incision. Venous thrombosis prophylaxis have been ordered including bilateral sequential compression devices and chemical prophylaxis    Procedure Details: Patient was scheduled on elective basis for diagnostic laparoscopy, cholecystectomy, possible revision of jejunojejunostomy and any other related and indicated procedures.  On the day of surgery after verification of informed consent she was given subcu heparin, she was taken to operating room, placed in supine position on the table, timeout was done, antibiotics were given, general anesthesia was established, standard, sterile preparation and draping of abdominal wall was performed.  Pneumoperitoneum was established with Veress needle in the left subcostal margin without technical problems.  Optical trocars were then introduced under laparoscopic vision.  Sergio limb was followed from proximal to distal end and extensive adhesions were noted at the mid level causing kinking and pulling of jejunojejunostomy in that area these  adhesions were taken down with scissors avoiding any enterotomy.  Sergio limb was then followed to the jejunojejunostomy and a dilated long blind end of the biliopancreatic limb was noted at the jejunojejunostomy and the mesenteric defect was noted to have dehisced, there was evidence of previous suturing in that area.  Blind end of the biliopancreatic limb was resected with 60 mm white stapler closer to the anastomosis.  Jejunojejunostomy mesenteric defect was closed with 2-0 Surgidac using Endo Stitch device.  Biliopancreatic limb was followed down to ligament of Treitz and common channel all the way down to ileocecal junction.  Sergio limb was roughly 150 cm, BP limb was 75 cm and common channel was more than 300 cm.  An endoscopy was performed scope was advanced from esophagus down to gastric pouch and into the proximal Sergio limb.  There was no evidence of any ulceration or any significant abnormalities.  Pouch was roughly 5 cm long, stoma was roughly 2-1/2 cm wide, there was no excessive candycane, there was no evidence of any bile in the Sergio limb or in the pouch.  Now we paid attention to the gallbladder.  Patient was placed in reverse Trendelenburg position and robot was docked.  Gallbladder fundus was retracted in the cephalad direction and Guerrero's pouch and upwards and lateral direction.  Adhesions of the omentum were taken down and peritoneum was peeled off from cystic artery and cystic duct.  Circumferential dissection was completed and only to structures were entering the gallbladder.  Cystic plate was dissected and critical view of safety was completed.  Artery and duct were divided between absorbable clips.  Gallbladder was dissected from the liver bed using cautery.  It was intrahepatic, chronically inflamed and liver bed was oozing.  Hemostasis was secured with cautery as needed.  Gallbladder was removed via Endo Catch through the right sided 12 mm port site.  Hemostatic agent was sprayed in the  gallbladder fossa after securing hemostasis.  There was no active bleeding but surface was raw due to intrahepatic nature of the gallbladder.  At conclusion of the operation however operative field was dry and there was no evidence of any bile leakage.  Clips at the cystic artery and duct were intact.  Robot was undocked.  Fascial defect at 12 mm port site was closed with interrupted 0 Vicryl sutures using Endo passer under laparoscopic vision.  Tap block was performed on both sides under laparoscopic vision.  After final satisfactory examination abdominal cavity trocars were removed, pneumoperitoneum was discontinued, skin was closed with 3-0 Monocryl.  Dermabond was applied.  Patient tolerated the operation well, was extubated in the OR and transferred to recovery room in stable condition.  Instrument, needle, sponge counts were correct at conclusion of the operation.  Complications:  None; patient tolerated the procedure well.    Disposition: PACU - hemodynamically stable.  Condition: stable         Additional Details: Dr. Chirinos was scrubbed, actively participated and is assisted in the laparoscopic, robotic and endoscopic components of the operation.  There was no qualified resident available.    Attending Attestation:     Stella Gan  Phone Number: [phone]

## 2024-05-10 ENCOUNTER — TELEPHONE (OUTPATIENT)
Dept: SURGERY | Facility: CLINIC | Age: 29
End: 2024-05-10
Payer: COMMERCIAL

## 2024-05-10 ENCOUNTER — PATIENT OUTREACH (OUTPATIENT)
Dept: CARE COORDINATION | Facility: CLINIC | Age: 29
End: 2024-05-10
Payer: COMMERCIAL

## 2024-05-10 ENCOUNTER — PATIENT MESSAGE (OUTPATIENT)
Dept: SURGERY | Facility: CLINIC | Age: 29
End: 2024-05-10
Payer: COMMERCIAL

## 2024-05-10 PROBLEM — K80.50 COLIC, BILIARY: Status: RESOLVED | Noted: 2024-05-08 | Resolved: 2024-05-10

## 2024-05-10 PROBLEM — K81.9 CHOLECYSTITIS: Status: RESOLVED | Noted: 2024-05-08 | Resolved: 2024-05-10

## 2024-05-10 PROBLEM — K80.50 BILIARY COLIC: Status: RESOLVED | Noted: 2023-12-22 | Resolved: 2024-05-10

## 2024-05-10 PROBLEM — K82.8 GALLBLADDER SLUDGE: Status: RESOLVED | Noted: 2023-12-01 | Resolved: 2024-05-10

## 2024-05-10 PROBLEM — Z90.49 S/P CHOLECYSTECTOMY: Status: ACTIVE | Noted: 2024-05-10

## 2024-05-10 NOTE — PATIENT INSTRUCTIONS
You are doing great!   You may shower, let soap and water run over incisions, pat dry.  Things will get easier over time.   Remember to increase your fluids and protein to goals  Do not eat and drink at the same time.   See the dietician as must as you need to.   Slowly increase your exercise and activity, as you get all your protein you will be able to have more energy.   Take your omeprazole, open the capsule and sprinkle onto applesauce. Do not swallow whole.  Take your Multivitamin, B12 and calcium tablets.

## 2024-05-10 NOTE — TELEPHONE ENCOUNTER
Outbound call to check on patient to check in on her post hospital discharge.  Patient stating she is having incisional pain, was taking oxycodone for pain but that just made her sleepy and constipated.  Patient has not been taking Tylenol.  Advised patient to start 1000mg Tylenol around the clock every 6 hours to see if this helps with her pain.  Will provide the on call number to patient via [employer] messaging so the patient has a life line if the pain does not improve or worsens this weekend.  Encouraged frequent ambulation, and good hydration.  Patient has moved her bowels.

## 2024-05-15 ENCOUNTER — APPOINTMENT (OUTPATIENT)
Dept: RADIOLOGY | Facility: HOSPITAL | Age: 29
End: 2024-05-15
Payer: COMMERCIAL

## 2024-05-15 ENCOUNTER — HOSPITAL ENCOUNTER (EMERGENCY)
Facility: HOSPITAL | Age: 29
Discharge: HOME | End: 2024-05-15
Attending: STUDENT IN AN ORGANIZED HEALTH CARE EDUCATION/TRAINING PROGRAM
Payer: COMMERCIAL

## 2024-05-15 ENCOUNTER — HOSPITAL ENCOUNTER (OUTPATIENT)
Dept: CARDIOLOGY | Facility: HOSPITAL | Age: 29
Discharge: HOME | End: 2024-05-15
Payer: COMMERCIAL

## 2024-05-15 ENCOUNTER — TELEPHONE (OUTPATIENT)
Dept: SURGERY | Facility: CLINIC | Age: 29
End: 2024-05-15
Payer: COMMERCIAL

## 2024-05-15 VITALS
WEIGHT: 218 LBS | RESPIRATION RATE: 18 BRPM | HEART RATE: 70 BPM | BODY MASS INDEX: 35.03 KG/M2 | TEMPERATURE: 97.7 F | DIASTOLIC BLOOD PRESSURE: 75 MMHG | HEIGHT: 66 IN | SYSTOLIC BLOOD PRESSURE: 118 MMHG | OXYGEN SATURATION: 100 %

## 2024-05-15 DIAGNOSIS — R10.31 RIGHT LOWER QUADRANT ABDOMINAL PAIN: Primary | ICD-10-CM

## 2024-05-15 LAB
ALBUMIN SERPL BCP-MCNC: 3.8 G/DL (ref 3.4–5)
ALP SERPL-CCNC: 82 U/L (ref 33–110)
ALT SERPL W P-5'-P-CCNC: 20 U/L (ref 7–45)
ANION GAP SERPL CALC-SCNC: 13 MMOL/L (ref 10–20)
APPEARANCE UR: CLEAR
AST SERPL W P-5'-P-CCNC: 19 U/L (ref 9–39)
BASOPHILS # BLD AUTO: 0.06 X10*3/UL (ref 0–0.1)
BASOPHILS NFR BLD AUTO: 0.9 %
BILIRUB SERPL-MCNC: 0.2 MG/DL (ref 0–1.2)
BILIRUB UR STRIP.AUTO-MCNC: NEGATIVE MG/DL
BNP SERPL-MCNC: 26 PG/ML (ref 0–99)
BUN SERPL-MCNC: 8 MG/DL (ref 6–23)
CALCIUM SERPL-MCNC: 8.8 MG/DL (ref 8.6–10.3)
CARDIAC TROPONIN I PNL SERPL HS: 3 NG/L (ref 0–13)
CHLORIDE SERPL-SCNC: 105 MMOL/L (ref 98–107)
CO2 SERPL-SCNC: 23 MMOL/L (ref 21–32)
COLOR UR: YELLOW
CREAT SERPL-MCNC: 0.6 MG/DL (ref 0.5–1.05)
EGFRCR SERPLBLD CKD-EPI 2021: >90 ML/MIN/1.73M*2
EOSINOPHIL # BLD AUTO: 0.32 X10*3/UL (ref 0–0.7)
EOSINOPHIL NFR BLD AUTO: 4.6 %
ERYTHROCYTE [DISTWIDTH] IN BLOOD BY AUTOMATED COUNT: 15.5 % (ref 11.5–14.5)
GLUCOSE SERPL-MCNC: 100 MG/DL (ref 74–99)
GLUCOSE UR STRIP.AUTO-MCNC: NORMAL MG/DL
HCG UR QL IA.RAPID: NEGATIVE
HCT VFR BLD AUTO: 37.4 % (ref 36–46)
HGB BLD-MCNC: 12.3 G/DL (ref 12–16)
IMM GRANULOCYTES # BLD AUTO: 0.01 X10*3/UL (ref 0–0.7)
IMM GRANULOCYTES NFR BLD AUTO: 0.1 % (ref 0–0.9)
KETONES UR STRIP.AUTO-MCNC: NEGATIVE MG/DL
LABORATORY COMMENT REPORT: NORMAL
LACTATE SERPL-SCNC: 0.7 MMOL/L (ref 0.4–2)
LEUKOCYTE ESTERASE UR QL STRIP.AUTO: ABNORMAL
LYMPHOCYTES # BLD AUTO: 3.15 X10*3/UL (ref 1.2–4.8)
LYMPHOCYTES NFR BLD AUTO: 44.9 %
MCH RBC QN AUTO: 25.4 PG (ref 26–34)
MCHC RBC AUTO-ENTMCNC: 32.9 G/DL (ref 32–36)
MCV RBC AUTO: 77 FL (ref 80–100)
MONOCYTES # BLD AUTO: 0.54 X10*3/UL (ref 0.1–1)
MONOCYTES NFR BLD AUTO: 7.7 %
MUCOUS THREADS #/AREA URNS AUTO: ABNORMAL /LPF
NEUTROPHILS # BLD AUTO: 2.94 X10*3/UL (ref 1.2–7.7)
NEUTROPHILS NFR BLD AUTO: 41.8 %
NITRITE UR QL STRIP.AUTO: NEGATIVE
NRBC BLD-RTO: 0 /100 WBCS (ref 0–0)
PATH REPORT.FINAL DX SPEC: NORMAL
PATH REPORT.GROSS SPEC: NORMAL
PATH REPORT.RELEVANT HX SPEC: NORMAL
PATH REPORT.TOTAL CANCER: NORMAL
PH UR STRIP.AUTO: 6.5 [PH]
PLATELET # BLD AUTO: 191 X10*3/UL (ref 150–450)
POTASSIUM SERPL-SCNC: 3.6 MMOL/L (ref 3.5–5.3)
PROT SERPL-MCNC: 7 G/DL (ref 6.4–8.2)
PROT UR STRIP.AUTO-MCNC: ABNORMAL MG/DL
RBC # BLD AUTO: 4.84 X10*6/UL (ref 4–5.2)
RBC # UR STRIP.AUTO: ABNORMAL /UL
RBC #/AREA URNS AUTO: ABNORMAL /HPF
SODIUM SERPL-SCNC: 137 MMOL/L (ref 136–145)
SP GR UR STRIP.AUTO: 1.03
SQUAMOUS #/AREA URNS AUTO: ABNORMAL /HPF
UROBILINOGEN UR STRIP.AUTO-MCNC: NORMAL MG/DL
WBC # BLD AUTO: 7 X10*3/UL (ref 4.4–11.3)
WBC #/AREA URNS AUTO: ABNORMAL /HPF

## 2024-05-15 PROCEDURE — 84075 ASSAY ALKALINE PHOSPHATASE: CPT | Performed by: PHYSICIAN ASSISTANT

## 2024-05-15 PROCEDURE — 93005 ELECTROCARDIOGRAM TRACING: CPT

## 2024-05-15 PROCEDURE — 99285 EMERGENCY DEPT VISIT HI MDM: CPT | Mod: 25

## 2024-05-15 PROCEDURE — 84484 ASSAY OF TROPONIN QUANT: CPT | Performed by: PHYSICIAN ASSISTANT

## 2024-05-15 PROCEDURE — 96374 THER/PROPH/DIAG INJ IV PUSH: CPT

## 2024-05-15 PROCEDURE — 71046 X-RAY EXAM CHEST 2 VIEWS: CPT | Performed by: RADIOLOGY

## 2024-05-15 PROCEDURE — 2550000001 HC RX 255 CONTRASTS: Performed by: PHYSICIAN ASSISTANT

## 2024-05-15 PROCEDURE — 2500000004 HC RX 250 GENERAL PHARMACY W/ HCPCS (ALT 636 FOR OP/ED)

## 2024-05-15 PROCEDURE — 81001 URINALYSIS AUTO W/SCOPE: CPT | Performed by: PHYSICIAN ASSISTANT

## 2024-05-15 PROCEDURE — 87086 URINE CULTURE/COLONY COUNT: CPT | Mod: PARLAB | Performed by: PHYSICIAN ASSISTANT

## 2024-05-15 PROCEDURE — 71275 CT ANGIOGRAPHY CHEST: CPT | Performed by: RADIOLOGY

## 2024-05-15 PROCEDURE — 71046 X-RAY EXAM CHEST 2 VIEWS: CPT

## 2024-05-15 PROCEDURE — 81025 URINE PREGNANCY TEST: CPT | Performed by: PHYSICIAN ASSISTANT

## 2024-05-15 PROCEDURE — 83880 ASSAY OF NATRIURETIC PEPTIDE: CPT | Performed by: PHYSICIAN ASSISTANT

## 2024-05-15 PROCEDURE — 74177 CT ABD & PELVIS W/CONTRAST: CPT | Performed by: RADIOLOGY

## 2024-05-15 PROCEDURE — 74177 CT ABD & PELVIS W/CONTRAST: CPT

## 2024-05-15 PROCEDURE — 85025 COMPLETE CBC W/AUTO DIFF WBC: CPT | Performed by: PHYSICIAN ASSISTANT

## 2024-05-15 PROCEDURE — 71275 CT ANGIOGRAPHY CHEST: CPT

## 2024-05-15 PROCEDURE — 83605 ASSAY OF LACTIC ACID: CPT | Performed by: PHYSICIAN ASSISTANT

## 2024-05-15 PROCEDURE — 36415 COLL VENOUS BLD VENIPUNCTURE: CPT | Performed by: PHYSICIAN ASSISTANT

## 2024-05-15 RX ORDER — METRONIDAZOLE 500 MG/1
500 TABLET ORAL 3 TIMES DAILY
Qty: 30 TABLET | Refills: 0 | Status: SHIPPED | OUTPATIENT
Start: 2024-05-15 | End: 2024-05-22

## 2024-05-15 RX ORDER — CIPROFLOXACIN 500 MG/1
500 TABLET ORAL 2 TIMES DAILY
Qty: 14 TABLET | Refills: 0 | Status: SHIPPED | OUTPATIENT
Start: 2024-05-15 | End: 2024-05-22

## 2024-05-15 RX ORDER — MORPHINE SULFATE 2 MG/ML
2 INJECTION, SOLUTION INTRAMUSCULAR; INTRAVENOUS ONCE
Status: COMPLETED | OUTPATIENT
Start: 2024-05-15 | End: 2024-05-15

## 2024-05-15 RX ADMIN — IOHEXOL 75 ML: 350 INJECTION, SOLUTION INTRAVENOUS at 17:51

## 2024-05-15 RX ADMIN — MORPHINE SULFATE 2 MG: 2 INJECTION, SOLUTION INTRAMUSCULAR; INTRAVENOUS at 19:20

## 2024-05-15 ASSESSMENT — LIFESTYLE VARIABLES
EVER HAD A DRINK FIRST THING IN THE MORNING TO STEADY YOUR NERVES TO GET RID OF A HANGOVER: NO
HAVE PEOPLE ANNOYED YOU BY CRITICIZING YOUR DRINKING: NO
TOTAL SCORE: 0
HAVE YOU EVER FELT YOU SHOULD CUT DOWN ON YOUR DRINKING: NO
EVER FELT BAD OR GUILTY ABOUT YOUR DRINKING: NO

## 2024-05-15 ASSESSMENT — PAIN DESCRIPTION - DESCRIPTORS: DESCRIPTORS: STABBING

## 2024-05-15 ASSESSMENT — PAIN - FUNCTIONAL ASSESSMENT: PAIN_FUNCTIONAL_ASSESSMENT: 0-10

## 2024-05-15 ASSESSMENT — PAIN SCALES - GENERAL
PAINLEVEL_OUTOF10: 4
PAINLEVEL_OUTOF10: 8
PAINLEVEL_OUTOF10: 9

## 2024-05-15 ASSESSMENT — PAIN DESCRIPTION - ORIENTATION: ORIENTATION: RIGHT;LOWER

## 2024-05-15 ASSESSMENT — PAIN DESCRIPTION - LOCATION: LOCATION: ABDOMEN

## 2024-05-15 NOTE — ED PROVIDER NOTES
HPI   Chief Complaint   Patient presents with    Abdominal Pain       HPI  Cecelia Phillips is a 29-year-old female presenting to ED with right lower quadrant abdominal pain that she describes as stabbing and 10 out of 10 in severity.  She says it started 2 days ago.  She had surgery last week by Dr. Gan where she had a cholecystectomy with a small bowel resection and of her hernia.  She also states that there is a bruise in her right lower quadrant that has been enlarging since her surgery.  She also states that she felt short of breath due to the pain.  She denies any fever or chills, nausea, vomiting, diarrhea, constipation.  She denies any other chest pain.  She is able to eat and drink okay as well.                  Juliana Coma Scale Score: 15                     Patient History   Past Medical History:   Diagnosis Date    Acute streptococcal tonsillitis, unspecified 09/04/2019    Strep tonsillitis    Anxiety     Biliary colic 12/22/2023    Cholecystitis 05/08/2024    Colic, biliary 05/08/2024    Depression     Encounter for contraceptive management, unspecified 05/20/2020    Contraception management    Encounter for insertion of intrauterine contraceptive device 06/17/2020    Encounter for insertion of mirena IUD    Encounter for insertion of intrauterine contraceptive device 02/09/2016    Encounter for IUD insertion    Encounter for insertion of intrauterine contraceptive device 11/16/2015    Encounter for insertion of mirena IUD    Encounter for pregnancy test, result negative 02/09/2016    Pregnancy test negative    Encounter for screening for infections with a predominantly sexual mode of transmission 02/09/2016    Screen for STD (sexually transmitted disease)    Gallbladder sludge 12/01/2023    GERD (gastroesophageal reflux disease)     Headache     Inadequate sleep hygiene 04/15/2021    History of difficulty sleeping    Low back pain, unspecified 06/15/2017    Acute low back pain    Morbid (severe) obesity  due to excess calories (Multi) 07/06/2020    Class 3 severe obesity due to excess calories with serious comorbidity and body mass index (BMI) of 40.0 to 44.9 in adult    Nontoxic goiter, unspecified 04/18/2021    Goiter    Other specified anxiety disorders 11/09/2015    Depression with anxiety    Other specified disorders of eustachian tube, right ear 08/08/2019    Dysfunction of right eustachian tube    Papillomavirus as the cause of diseases classified elsewhere 09/21/2017    HPV in female    Personal history of diseases of the skin and subcutaneous tissue 08/09/2019    History of acne    Personal history of diseases of the skin and subcutaneous tissue 02/26/2016    History of cellulitis    Personal history of diseases of the skin and subcutaneous tissue 02/26/2016    History of skin pruritus    Personal history of other diseases of the female genital tract 01/06/2017    History of dysmenorrhea    Personal history of other diseases of the female genital tract 12/07/2015    History of irregular menstrual cycles    Personal history of other diseases of the female genital tract 11/09/2015    History of vaginitis    Personal history of other diseases of the female genital tract 10/11/2018    History of irregular menstrual cycles    Personal history of other diseases of the respiratory system 04/28/2016    History of acute sinusitis    Personal history of other diseases of the respiratory system 04/28/2016    History of upper respiratory infection    Personal history of other diseases of the respiratory system 10/08/2021    History of acute sinusitis    Personal history of other endocrine, nutritional and metabolic disease 12/07/2015    History of hyperprolactinemia    Personal history of other infectious and parasitic diseases 10/14/2021    History of candidiasis of vagina    Personal history of other specified conditions 11/16/2015    History of urinary frequency    Persons encountering health services in other  specified circumstances 08/09/2019    Establishing care with new doctor, encounter for    Right upper quadrant pain 04/28/2016    Right upper quadrant abdominal pain    Somnolence 04/15/2021    Daytime sleepiness    Strain of muscle, fascia and tendon of lower back, initial encounter 10/10/2016    Lumbar strain     Past Surgical History:   Procedure Laterality Date    ADENOIDECTOMY      BARIATRIC SURGERY      CHOLECYSTECTOMY  05/08/2024    OTHER SURGICAL HISTORY  01/03/2022    Sleeve gastrectomy    TONSILLECTOMY  11/02/2015    Tonsillectomy With Adenoidectomy     Family History   Problem Relation Name Age of Onset    Other (Diabetes mellitus) Mother shelby     Diabetes Mother shelby     No Known Problems Father      Prostate cancer Father's Brother      Hypertension Maternal Grandmother emmarine     Esophageal cancer Paternal Grandfather      Cancer Maternal Grandfather Mandie      Social History     Tobacco Use    Smoking status: Never     Passive exposure: Never    Smokeless tobacco: Never   Vaping Use    Vaping status: Never Used   Substance Use Topics    Alcohol use: Never    Drug use: Never       Physical Exam   ED Triage Vitals [05/15/24 1635]   Temperature Heart Rate Respirations BP   36.5 °C (97.7 °F) 95 18 130/88      Pulse Ox Temp src Heart Rate Source Patient Position   99 % -- -- --      BP Location FiO2 (%)     -- --       Physical Exam  General:  Pleasant and cooperative. No apparent distress.  HEENT:  Normocephalic, atraumatic, mucus membranes moist.   Neck:  Trachea midline.  No JVD.    Chest:  Clear to auscultation bilaterally. No wheezes, rales, or rhonchi.  CV:  Regular rate and rhythm.  Positive S1/S2.   Abdomen: Right lower quadrant tenderness to palpation.  2 x 2 centimeter bruise located in the right lower quadrant.  Surgical scar present in the right lower quadrant from previous surgery.  Nondistended.  Extremities:  No lower extremity edema or cyanosis.   Neurological:  AAOx3. No focal  deficits.  Skin:  Warm and dry.   ED Course & MDM   ED Course as of 05/15/24 2134   Wed May 15, 2024   2033 CBC and Auto Differential(!)  wnl []   2033 Comprehensive metabolic panel(!)  reviewed []   2034 Urinalysis with Reflex Culture and Microscopic(!)  Noted mild hematuria, no infectious findings []   2034 Troponin I, High Sensitivity  wnl []   2034 B-Type Natriuretic Peptide  wnl []   2034 Lactate  wnl []   2034 hCG, Urine, Qualitative  negative []      ED Course User Index  [AH] Viola Chanel MD         Diagnoses as of 05/15/24 2134   Right lower quadrant abdominal pain       Medical Decision Making  5/15/2024 6:43 PM: Patient seen and evaluated.  Will obtain labs including CBC, CMP, lactate, magnesium.  Will wait for CT abdomen pelvis and CT angio chest.  Will also obtain urinalysis and hCG.  Will give morphine for pain relief.  5/15/2024 9:34 PM: Labs are insignificant.  CT AP showed fluid collection and gas focus at the gallbladder fossa which may be secondary to recent surgery. I spoke with his surgeon Dr. Gan who recommended to discharge her on a 7-day course of ciprofloxacin and Flagyl and to take her home pain meds for pain relief.  She is following up with Dr. Gan tomorrow as per scheduled appointment.  She is stable for discharge.    Procedure  Procedures     Erma Francois MD  Resident  05/15/24 2135

## 2024-05-15 NOTE — TELEPHONE ENCOUNTER
Tried to call back to advise going to ED to rule out PE.  Will also send [employer] message with the same information.

## 2024-05-15 NOTE — ED TRIAGE NOTES
The patient was seen and examined in triage.    History of Present Illness: The patient is a 29-year-old female who presents emergency department for assessment of abdominal pain.  She reports that last week she had surgery by Dr. Gan.  She reports that she had her gallbladder removed, small bowel, and her hernia.  She reports that it was a bigger surgery than they expected.  She reports that she has had some abdominal discomfort from surgery however she noticed she developed new right lower quadrant abdominal tenderness.  She reports that she is a bruise that has been enlarging.  She also noted that she has been feeling short of breath.  She denies any peripheral edema.  She denies any chest pains.  She has not had cough.  She denies changes to urination.    Brief Physical Exam:  Exam is limited by the patient sitting in a chair in triage.   Heart: Regular rate and rhythm.   Lungs: Clear to auscultation bilaterally.   Abdomen: Soft.  Surgical scars healing with surgical glue over.  Small area of ecchymosis to the right lower quadrant.  Right lower quadrant tenderness.  No signs of peritonitis    Plan: Appropriate labs and diagnostic imaging were ordered.      For the remainder of the patient's workup and ED course, please refer to the main ED provider note. We discussed need for diagnostic testing including laboratory studies and imaging.  We also discussed that they may be asked to wait in the waiting room while these tests are pending.  They understand that if they choose to leave without having the testing completed or resulted that we cannot rule out acute life threatening illnesses and the risks involved could lead to worsening condition, permanent disability or even death.      Disclaimer: This note was dictated by speech recognition. Minor errors in transcription may be present. Please call if questions.

## 2024-05-15 NOTE — ED TRIAGE NOTES
Pt states had abd surgery last week by Dr Gan. Pain in RLQ, non-radiating. Dr Gan asked pt to come to ER for evaluation

## 2024-05-16 ENCOUNTER — OFFICE VISIT (OUTPATIENT)
Dept: SURGERY | Facility: CLINIC | Age: 29
End: 2024-05-16
Payer: COMMERCIAL

## 2024-05-16 VITALS
TEMPERATURE: 98.5 F | DIASTOLIC BLOOD PRESSURE: 72 MMHG | OXYGEN SATURATION: 99 % | HEIGHT: 66 IN | HEART RATE: 82 BPM | SYSTOLIC BLOOD PRESSURE: 104 MMHG | BODY MASS INDEX: 36.13 KG/M2 | WEIGHT: 224.8 LBS

## 2024-05-16 DIAGNOSIS — Z90.49 S/P CHOLECYSTECTOMY: Primary | ICD-10-CM

## 2024-05-16 DIAGNOSIS — G89.18 POST-OP PAIN: ICD-10-CM

## 2024-05-16 LAB — HOLD SPECIMEN: NORMAL

## 2024-05-16 PROCEDURE — 93005 ELECTROCARDIOGRAM TRACING: CPT

## 2024-05-16 PROCEDURE — 3008F BODY MASS INDEX DOCD: CPT | Performed by: SURGERY

## 2024-05-16 PROCEDURE — 1036F TOBACCO NON-USER: CPT | Performed by: SURGERY

## 2024-05-16 PROCEDURE — 99024 POSTOP FOLLOW-UP VISIT: CPT | Performed by: SURGERY

## 2024-05-16 ASSESSMENT — PAIN SCALES - GENERAL: PAINLEVEL: 8

## 2024-05-16 NOTE — DISCHARGE INSTRUCTIONS
We will discharge you with 7-day course of ciprofloxacin and Flagyl.  Please take your home pain meds as instructed to by your surgeon after your surgery last week.  This will help you with pain relief.  Your pain is related to postop fluid collection.  Please follow-up with your primary care provider in 1 week and your surgeon Dr. Gan tomorrow.

## 2024-05-17 LAB — BACTERIA UR CULT: NORMAL

## 2024-05-22 LAB
ATRIAL RATE: 91 BPM
P AXIS: 54 DEGREES
PR INTERVAL: 171 MS
Q ONSET: 251 MS
QRS COUNT: 15 BEATS
QRS DURATION: 96 MS
QT INTERVAL: 336 MS
QTC CALCULATION(BAZETT): 416 MS
QTC FREDERICIA: 387 MS
R AXIS: 6 DEGREES
T AXIS: 7 DEGREES
T OFFSET: 419 MS
VENTRICULAR RATE: 92 BPM

## 2024-05-24 DIAGNOSIS — G89.18 POST-OP PAIN: ICD-10-CM

## 2024-05-24 RX ORDER — TRAMADOL HYDROCHLORIDE 50 MG/1
50 TABLET ORAL EVERY 6 HOURS PRN
Qty: 15 TABLET | Refills: 0 | Status: SHIPPED | OUTPATIENT
Start: 2024-05-24 | End: 2024-05-31

## 2024-05-27 ENCOUNTER — PATIENT MESSAGE (OUTPATIENT)
Dept: SURGERY | Facility: CLINIC | Age: 29
End: 2024-05-27
Payer: COMMERCIAL

## 2024-05-28 DIAGNOSIS — K80.50 BILIARY COLIC: ICD-10-CM

## 2024-05-28 DIAGNOSIS — K21.9 GASTROESOPHAGEAL REFLUX DISEASE WITHOUT ESOPHAGITIS: ICD-10-CM

## 2024-05-28 RX ORDER — OXYCODONE HCL 5 MG/5 ML
5 SOLUTION, ORAL ORAL EVERY 8 HOURS PRN
Qty: 100 ML | Refills: 0 | Status: SHIPPED | OUTPATIENT
Start: 2024-05-28 | End: 2024-06-02

## 2024-06-06 ENCOUNTER — CLINICAL SUPPORT (OUTPATIENT)
Dept: EMERGENCY MEDICINE | Facility: HOSPITAL | Age: 29
End: 2024-06-06
Payer: COMMERCIAL

## 2024-06-06 ENCOUNTER — APPOINTMENT (OUTPATIENT)
Dept: SURGERY | Facility: CLINIC | Age: 29
End: 2024-06-06
Payer: COMMERCIAL

## 2024-06-06 ENCOUNTER — APPOINTMENT (OUTPATIENT)
Dept: RADIOLOGY | Facility: HOSPITAL | Age: 29
End: 2024-06-06
Payer: COMMERCIAL

## 2024-06-06 ENCOUNTER — HOSPITAL ENCOUNTER (EMERGENCY)
Facility: HOSPITAL | Age: 29
Discharge: HOME | End: 2024-06-06
Attending: EMERGENCY MEDICINE
Payer: COMMERCIAL

## 2024-06-06 VITALS
BODY MASS INDEX: 34.55 KG/M2 | OXYGEN SATURATION: 100 % | TEMPERATURE: 97.9 F | HEIGHT: 66 IN | DIASTOLIC BLOOD PRESSURE: 75 MMHG | HEART RATE: 71 BPM | WEIGHT: 215 LBS | RESPIRATION RATE: 18 BRPM | SYSTOLIC BLOOD PRESSURE: 107 MMHG

## 2024-06-06 DIAGNOSIS — R33.8 ACUTE URINARY RETENTION: ICD-10-CM

## 2024-06-06 DIAGNOSIS — R30.0 DYSURIA: Primary | ICD-10-CM

## 2024-06-06 LAB
ABO GROUP (TYPE) IN BLOOD: NORMAL
ALBUMIN SERPL BCP-MCNC: 3.9 G/DL (ref 3.4–5)
ALP SERPL-CCNC: 76 U/L (ref 33–110)
ALT SERPL W P-5'-P-CCNC: 18 U/L (ref 7–45)
ANION GAP SERPL CALC-SCNC: 12 MMOL/L (ref 10–20)
ANTIBODY SCREEN: NORMAL
APPEARANCE UR: CLEAR
APTT PPP: 93 SECONDS (ref 27–38)
AST SERPL W P-5'-P-CCNC: 14 U/L (ref 9–39)
ATRIAL RATE: 80 BPM
BASOPHILS # BLD AUTO: 0.03 X10*3/UL (ref 0–0.1)
BASOPHILS NFR BLD AUTO: 0.4 %
BILIRUB SERPL-MCNC: 0.4 MG/DL (ref 0–1.2)
BILIRUB UR STRIP.AUTO-MCNC: NEGATIVE MG/DL
BUN SERPL-MCNC: 8 MG/DL (ref 6–23)
CALCIUM SERPL-MCNC: 9 MG/DL (ref 8.6–10.6)
CHLORIDE SERPL-SCNC: 102 MMOL/L (ref 98–107)
CO2 SERPL-SCNC: 28 MMOL/L (ref 21–32)
COLOR UR: ABNORMAL
CREAT SERPL-MCNC: 0.64 MG/DL (ref 0.5–1.05)
EGFRCR SERPLBLD CKD-EPI 2021: >90 ML/MIN/1.73M*2
EOSINOPHIL # BLD AUTO: 0.08 X10*3/UL (ref 0–0.7)
EOSINOPHIL NFR BLD AUTO: 1.2 %
ERYTHROCYTE [DISTWIDTH] IN BLOOD BY AUTOMATED COUNT: 14.9 % (ref 11.5–14.5)
GLUCOSE SERPL-MCNC: 93 MG/DL (ref 74–99)
GLUCOSE UR STRIP.AUTO-MCNC: NORMAL MG/DL
HCT VFR BLD AUTO: 35.8 % (ref 36–46)
HGB BLD-MCNC: 11.7 G/DL (ref 12–16)
IMM GRANULOCYTES # BLD AUTO: 0.01 X10*3/UL (ref 0–0.7)
IMM GRANULOCYTES NFR BLD AUTO: 0.1 % (ref 0–0.9)
INR PPP: 1.1 (ref 0.9–1.1)
KETONES UR STRIP.AUTO-MCNC: NEGATIVE MG/DL
LACTATE SERPL-SCNC: 0.9 MMOL/L (ref 0.4–2)
LEUKOCYTE ESTERASE UR QL STRIP.AUTO: NEGATIVE
LIPASE SERPL-CCNC: 17 U/L (ref 9–82)
LYMPHOCYTES # BLD AUTO: 3.43 X10*3/UL (ref 1.2–4.8)
LYMPHOCYTES NFR BLD AUTO: 51.3 %
MAGNESIUM SERPL-MCNC: 2.01 MG/DL (ref 1.6–2.4)
MCH RBC QN AUTO: 24.5 PG (ref 26–34)
MCHC RBC AUTO-ENTMCNC: 32.7 G/DL (ref 32–36)
MCV RBC AUTO: 75 FL (ref 80–100)
MONOCYTES # BLD AUTO: 0.58 X10*3/UL (ref 0.1–1)
MONOCYTES NFR BLD AUTO: 8.7 %
NEUTROPHILS # BLD AUTO: 2.56 X10*3/UL (ref 1.2–7.7)
NEUTROPHILS NFR BLD AUTO: 38.3 %
NITRITE UR QL STRIP.AUTO: NEGATIVE
NRBC BLD-RTO: 0 /100 WBCS (ref 0–0)
P AXIS: 49 DEGREES
P OFFSET: 184 MS
P ONSET: 131 MS
PH UR STRIP.AUTO: 7 [PH]
PLATELET # BLD AUTO: 198 X10*3/UL (ref 150–450)
POTASSIUM SERPL-SCNC: 3.2 MMOL/L (ref 3.5–5.3)
PR INTERVAL: 176 MS
PROT SERPL-MCNC: 7.2 G/DL (ref 6.4–8.2)
PROT UR STRIP.AUTO-MCNC: NEGATIVE MG/DL
PROTHROMBIN TIME: 11.9 SECONDS (ref 9.8–12.8)
Q ONSET: 219 MS
QRS COUNT: 13 BEATS
QRS DURATION: 78 MS
QT INTERVAL: 370 MS
QTC CALCULATION(BAZETT): 426 MS
QTC FREDERICIA: 407 MS
R AXIS: 18 DEGREES
RBC # BLD AUTO: 4.77 X10*6/UL (ref 4–5.2)
RBC # UR STRIP.AUTO: NEGATIVE /UL
RH FACTOR (ANTIGEN D): NORMAL
SODIUM SERPL-SCNC: 139 MMOL/L (ref 136–145)
SP GR UR STRIP.AUTO: >1.05
T AXIS: 18 DEGREES
T OFFSET: 404 MS
UROBILINOGEN UR STRIP.AUTO-MCNC: NORMAL MG/DL
VENTRICULAR RATE: 80 BPM
WBC # BLD AUTO: 6.7 X10*3/UL (ref 4.4–11.3)

## 2024-06-06 PROCEDURE — 86850 RBC ANTIBODY SCREEN: CPT | Performed by: STUDENT IN AN ORGANIZED HEALTH CARE EDUCATION/TRAINING PROGRAM

## 2024-06-06 PROCEDURE — 85610 PROTHROMBIN TIME: CPT | Performed by: STUDENT IN AN ORGANIZED HEALTH CARE EDUCATION/TRAINING PROGRAM

## 2024-06-06 PROCEDURE — 96374 THER/PROPH/DIAG INJ IV PUSH: CPT | Mod: 59

## 2024-06-06 PROCEDURE — 2500000004 HC RX 250 GENERAL PHARMACY W/ HCPCS (ALT 636 FOR OP/ED): Performed by: STUDENT IN AN ORGANIZED HEALTH CARE EDUCATION/TRAINING PROGRAM

## 2024-06-06 PROCEDURE — 83735 ASSAY OF MAGNESIUM: CPT | Performed by: STUDENT IN AN ORGANIZED HEALTH CARE EDUCATION/TRAINING PROGRAM

## 2024-06-06 PROCEDURE — 74177 CT ABD & PELVIS W/CONTRAST: CPT | Mod: FOREIGN READ | Performed by: RADIOLOGY

## 2024-06-06 PROCEDURE — 51702 INSERT TEMP BLADDER CATH: CPT

## 2024-06-06 PROCEDURE — 99285 EMERGENCY DEPT VISIT HI MDM: CPT | Performed by: EMERGENCY MEDICINE

## 2024-06-06 PROCEDURE — 83605 ASSAY OF LACTIC ACID: CPT | Performed by: STUDENT IN AN ORGANIZED HEALTH CARE EDUCATION/TRAINING PROGRAM

## 2024-06-06 PROCEDURE — 93005 ELECTROCARDIOGRAM TRACING: CPT | Mod: 59

## 2024-06-06 PROCEDURE — 99285 EMERGENCY DEPT VISIT HI MDM: CPT | Mod: 25

## 2024-06-06 PROCEDURE — 83690 ASSAY OF LIPASE: CPT | Performed by: STUDENT IN AN ORGANIZED HEALTH CARE EDUCATION/TRAINING PROGRAM

## 2024-06-06 PROCEDURE — 74177 CT ABD & PELVIS W/CONTRAST: CPT

## 2024-06-06 PROCEDURE — 81003 URINALYSIS AUTO W/O SCOPE: CPT | Performed by: EMERGENCY MEDICINE

## 2024-06-06 PROCEDURE — 80053 COMPREHEN METABOLIC PANEL: CPT | Performed by: STUDENT IN AN ORGANIZED HEALTH CARE EDUCATION/TRAINING PROGRAM

## 2024-06-06 PROCEDURE — 2550000001 HC RX 255 CONTRASTS: Performed by: EMERGENCY MEDICINE

## 2024-06-06 PROCEDURE — 96361 HYDRATE IV INFUSION ADD-ON: CPT | Mod: 59

## 2024-06-06 PROCEDURE — 85025 COMPLETE CBC W/AUTO DIFF WBC: CPT | Performed by: STUDENT IN AN ORGANIZED HEALTH CARE EDUCATION/TRAINING PROGRAM

## 2024-06-06 PROCEDURE — 36415 COLL VENOUS BLD VENIPUNCTURE: CPT | Performed by: STUDENT IN AN ORGANIZED HEALTH CARE EDUCATION/TRAINING PROGRAM

## 2024-06-06 PROCEDURE — 51798 US URINE CAPACITY MEASURE: CPT

## 2024-06-06 RX ORDER — CEPHALEXIN 500 MG/1
500 CAPSULE ORAL 4 TIMES DAILY
Qty: 40 CAPSULE | Refills: 0 | Status: SHIPPED | OUTPATIENT
Start: 2024-06-06 | End: 2024-06-06 | Stop reason: WASHOUT

## 2024-06-06 RX ORDER — FLUCONAZOLE 150 MG/1
150 TABLET ORAL ONCE
Qty: 1 TABLET | Refills: 0 | Status: SHIPPED | OUTPATIENT
Start: 2024-06-06 | End: 2024-06-06 | Stop reason: WASHOUT

## 2024-06-06 RX ORDER — MORPHINE SULFATE 4 MG/ML
4 INJECTION INTRAVENOUS ONCE
Status: COMPLETED | OUTPATIENT
Start: 2024-06-06 | End: 2024-06-06

## 2024-06-06 RX ADMIN — IOHEXOL 80 ML: 350 INJECTION, SOLUTION INTRAVENOUS at 04:30

## 2024-06-06 RX ADMIN — MORPHINE SULFATE 4 MG: 4 INJECTION, SOLUTION INTRAMUSCULAR; INTRAVENOUS at 04:16

## 2024-06-06 RX ADMIN — SODIUM CHLORIDE, POTASSIUM CHLORIDE, SODIUM LACTATE AND CALCIUM CHLORIDE 1000 ML: 600; 310; 30; 20 INJECTION, SOLUTION INTRAVENOUS at 03:05

## 2024-06-06 ASSESSMENT — COLUMBIA-SUICIDE SEVERITY RATING SCALE - C-SSRS
1. IN THE PAST MONTH, HAVE YOU WISHED YOU WERE DEAD OR WISHED YOU COULD GO TO SLEEP AND NOT WAKE UP?: NO
6. HAVE YOU EVER DONE ANYTHING, STARTED TO DO ANYTHING, OR PREPARED TO DO ANYTHING TO END YOUR LIFE?: NO
2. HAVE YOU ACTUALLY HAD ANY THOUGHTS OF KILLING YOURSELF?: NO

## 2024-06-06 ASSESSMENT — PAIN SCALES - GENERAL: PAINLEVEL_OUTOF10: 8

## 2024-06-06 ASSESSMENT — PAIN DESCRIPTION - LOCATION: LOCATION: ABDOMEN

## 2024-06-06 ASSESSMENT — PAIN DESCRIPTION - ORIENTATION: ORIENTATION: RIGHT;LOWER

## 2024-06-06 ASSESSMENT — PAIN DESCRIPTION - DESCRIPTORS: DESCRIPTORS: SHARP;STABBING

## 2024-06-06 ASSESSMENT — PAIN DESCRIPTION - FREQUENCY: FREQUENCY: INTERMITTENT

## 2024-06-06 ASSESSMENT — PAIN - FUNCTIONAL ASSESSMENT: PAIN_FUNCTIONAL_ASSESSMENT: 0-10

## 2024-06-06 NOTE — PROGRESS NOTES
EKG showed this patient was handed off me during signout, please see the previous providers note for more detailed H&P.  Briefly, patient is a 29-year-old female with recent cholecystectomy that was complicated by postoperative hematoma formation who presented with acute urinary retention.  Found to have 500 cc on postvoid residual.  Plan at time of signout was to follow-up UA, patient was having a Dyson inserted for acute urinary retention and if urine was positive to treat with antibiotics.  Regardless, patient will be discharged with urology follow-up within 48 hours for acute urinary retention.      Under my care, there was a delay in obtaining urine sample.  Eventually we did get wet and showed no signs of infection.  We did wish to discharge patient with her Dyson in place and to follow-up with urology in 48 hours however she did not wish to leave the emergency department with a Dyson in place.  We offered her a trial of void and postvoid residual to prove that she was able to urinate and completely empty her bladder.  She did except this and this was pending at the time of my signout.  She was signed out to the oncoming team providers pending trial of voiding and postvoid residual scan to determine whether she would be able to be discharged without indwelling Dyson  She was in stable condition at the time of  my signout.      Discussed with Dr. Sawant who agrees.      Jens Delgado MD  Emergency Medicine, PGY3    ED Course as of 06/12/24 1110   Thu Jun 06, 2024   1228 Dyson placed.  Urine does not look hemorrhagic.  UA still pending. [DM]   1443 UA unremarkable.  I had a long discussion with the patient and recommended that we discharge her with a Dyson and outpatient urology follow-up for a trial of void but she really does not want to leave with a catheter so we are going to try to do a trial of void here first.  I still think that this is spinal related.  No recent changes in medications and she is  without any other findings concerning for anticholinergic syndrome.  No infection.  Unclear why she is retaining. [DM]      ED Course User Index  [DM] Vick Sawant MD         Diagnoses as of 06/12/24 1110   Dysuria   Acute urinary retention     ** Please excuse any errors in grammar or translation related to this dictation. Voice recognition software was utilized to prepare this document. **       iVck Sawant MD  [facility] Emergency Medicine

## 2024-06-06 NOTE — ED PROVIDER NOTES
HPI   Chief Complaint   Patient presents with    Post-op Problem       HPI     Patient is a 29-year-old female with a past medical history of biliary dyskinesia and Sergio-en-Y gastric bypass who is 1 month out from a robotic assisted lap cholecystectomy with resection of the blind-ending loop at the JJ anastomosis and reduction of an internal hernia with Dr. Gan presenting to the emergency department with abdominal pain.  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  Has had around 2 days worth of increasing constipation, but is still passing flatus.  Her bigger concern is a feeling of bladder fullness, inability to void for the past 2 days.  Denies any fever, chills, nausea, vomiting.  Has diffuse abdominal tenderness as well, worst in the right lower quadrant.               Hume Coma Scale Score: 15                     Patient History   Past Medical History:   Diagnosis Date    Acute streptococcal tonsillitis, unspecified 09/04/2019    Strep tonsillitis    Anxiety     Biliary colic 12/22/2023    Cholecystitis 05/08/2024    Colic, biliary 05/08/2024    Depression     Encounter for contraceptive management, unspecified 05/20/2020    Contraception management    Encounter for insertion of intrauterine contraceptive device 06/17/2020    Encounter for insertion of mirena IUD    Encounter for insertion of intrauterine contraceptive device 02/09/2016    Encounter for IUD insertion    Encounter for insertion of intrauterine contraceptive device 11/16/2015    Encounter for insertion of mirena IUD    Encounter for pregnancy test, result negative 02/09/2016    Pregnancy test negative    Encounter for screening for infections with a predominantly sexual mode of transmission 02/09/2016    Screen for STD (sexually transmitted disease)    Gallbladder sludge 12/01/2023    GERD (gastroesophageal reflux disease)     Headache     Inadequate sleep hygiene 04/15/2021    History of difficulty sleeping    Low back pain,  unspecified 06/15/2017    Acute low back pain    Morbid (severe) obesity due to excess calories (Multi) 07/06/2020    Class 3 severe obesity due to excess calories with serious comorbidity and body mass index (BMI) of 40.0 to 44.9 in adult    Nontoxic goiter, unspecified 04/18/2021    Goiter    Other specified anxiety disorders 11/09/2015    Depression with anxiety    Other specified disorders of eustachian tube, right ear 08/08/2019    Dysfunction of right eustachian tube    Papillomavirus as the cause of diseases classified elsewhere 09/21/2017    HPV in female    Personal history of diseases of the skin and subcutaneous tissue 08/09/2019    History of acne    Personal history of diseases of the skin and subcutaneous tissue 02/26/2016    History of cellulitis    Personal history of diseases of the skin and subcutaneous tissue 02/26/2016    History of skin pruritus    Personal history of other diseases of the female genital tract 01/06/2017    History of dysmenorrhea    Personal history of other diseases of the female genital tract 12/07/2015    History of irregular menstrual cycles    Personal history of other diseases of the female genital tract 11/09/2015    History of vaginitis    Personal history of other diseases of the female genital tract 10/11/2018    History of irregular menstrual cycles    Personal history of other diseases of the respiratory system 04/28/2016    History of acute sinusitis    Personal history of other diseases of the respiratory system 04/28/2016    History of upper respiratory infection    Personal history of other diseases of the respiratory system 10/08/2021    History of acute sinusitis    Personal history of other endocrine, nutritional and metabolic disease 12/07/2015    History of hyperprolactinemia    Personal history of other infectious and parasitic diseases 10/14/2021    History of candidiasis of vagina    Personal history of other specified conditions 11/16/2015    History of  urinary frequency    Persons encountering health services in other specified circumstances 08/09/2019    Establishing care with new doctor, encounter for    Right upper quadrant pain 04/28/2016    Right upper quadrant abdominal pain    Somnolence 04/15/2021    Daytime sleepiness    Strain of muscle, fascia and tendon of lower back, initial encounter 10/10/2016    Lumbar strain     Past Surgical History:   Procedure Laterality Date    ADENOIDECTOMY      BARIATRIC SURGERY      CHOLECYSTECTOMY  05/08/2024    OTHER SURGICAL HISTORY  01/03/2022    Sleeve gastrectomy    TONSILLECTOMY  11/02/2015    Tonsillectomy With Adenoidectomy     Family History   Problem Relation Name Age of Onset    Other (Diabetes mellitus) Mother shelby     Diabetes Mother shelby     No Known Problems Father      Prostate cancer Father's Brother      Hypertension Maternal Grandmother emmarine     Esophageal cancer Paternal Grandfather      Cancer Maternal Grandfather Mandie      Social History     Tobacco Use    Smoking status: Never     Passive exposure: Never    Smokeless tobacco: Never   Vaping Use    Vaping status: Never Used   Substance Use Topics    Alcohol use: Never    Drug use: Never       Physical Exam   ED Triage Vitals [06/06/24 0114]   Temperature Heart Rate Respirations BP   36.6 °C (97.9 °F) (!) 102 18 (!) 135/93      Pulse Ox Temp Source Heart Rate Source Patient Position   98 % Temporal Monitor Sitting      BP Location FiO2 (%)     Right arm --       Physical Exam  Constitutional:       Appearance: She is not toxic-appearing or diaphoretic.   HENT:      Head: Normocephalic and atraumatic.      Nose: Nose normal.   Eyes:      General: No scleral icterus.        Right eye: No discharge.         Left eye: No discharge.      Conjunctiva/sclera: Conjunctivae normal.   Cardiovascular:      Rate and Rhythm: Normal rate.      Heart sounds: No murmur heard.     No friction rub. No gallop.   Pulmonary:      Effort: No respiratory  distress.      Breath sounds: Normal breath sounds.   Abdominal:      General: There is no distension.      Palpations: Abdomen is soft.      Comments: Diffusely tender to palpation worst in the right lower quadrant and suprapubic region without rebound or guarding.   Musculoskeletal:         General: No deformity or signs of injury.      Cervical back: Neck supple. No rigidity.   Skin:     General: Skin is warm and dry.      Capillary Refill: Capillary refill takes less than 2 seconds.      Coloration: Skin is not pale.      Findings: No erythema or rash.   Neurological:      Mental Status: She is alert and oriented to person, place, and time.   Psychiatric:         Mood and Affect: Mood normal.         Behavior: Behavior normal.         ED Course & MDM   ED Course as of 06/06/24 1942   Thu Jun 06, 2024   1228 Dyson placed.  Urine does not look hemorrhagic.  UA still pending. [DM]   1443 UA unremarkable.  I had a long discussion with the patient and recommended that we discharge her with a Dyson and outpatient urology follow-up for a trial of void but she really does not want to leave with a catheter so we are going to try to do a trial of void here first.  I still think that this is spinal related.  No recent changes in medications and she is without any other findings concerning for anticholinergic syndrome.  No infection.  Unclear why she is retaining. [DM]      ED Course User Index  [DM] Vick Sawant MD         Diagnoses as of 06/06/24 1942   Dysuria   Acute urinary retention   EKG showing normal sinus rate and rhythm, normal axis, normal intervals, no signs of acute ST elevation or depression    Medical Decision Making  Patient is a 29-year-old female presenting to the emergency department with abdominal pain in the setting of a recent cholecystectomy with partial bowel resection.  Sent for CT abdomen pelvis that showed normal postoperative changes, no new significant abscess, hematoma, evidence of  obstruction.  Postvoid residual at bedside was initially 60 and patient was given fluids for a challenge.  Was still unable to void and repeat postvoid showed 480 mL in the patient's bladder.  Suspect that the first bladder reading was inaccurate.  I did discuss with the patient the risks and benefits of placing a Dyson catheter versus straight catheterization and given the patient was presenting with signs and symptoms consistent with acute urinary retention.  Went to the patient's med list and recommended discontinuing Atarax as this may contribute to urinary retention.  Patient consented to Dyson placement.  Will be handed off pending Dyson placement and reevaluation for possible hemorrhagic cystitis, need for irrigation versus need for outpatient follow-up for urinary retention.  Patient repeatedly denied any saddle anesthesia, back pain, lower extremity numbness, tingling, weakness, ataxia, or other signs or symptoms to suggest a epidural abscess or cord compression.  Patient handed off in stable condition pending the placement of the Dyson and final recommendations.    Procedure  Procedures     Brooks Barker MD  Resident  06/06/24 0806       Brooks Barker MD  Resident  06/06/24 8966

## 2024-06-06 NOTE — CONSULTS
Reason For Consult  Postop abdominal pain s/p robotic assisted lap yousuf and SBR    History Of Present Illness  Cecelia Phillips is a 29 y.o. female with a hx of biliary dyskinesia and RNYGB who underwent a robotic-assisted lap yousuf with TEAGAN and resection of blind ending loop at the JJ anastomosis with closure of the JJ defect and reduction of internal hernia on 5/8 (Dr. Gan). She was discharged on POD1, but her postop course has since been c/b development of a fluid collection in the gallbladder fossa, identified on 5/15. This was initially managed with cipro/flagyl x 1 week.    Today, she presents with RLQ abdominal pain, similar to prior. 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 Tylenol does not feel sufficient. Additionally, she states that she has not been voiding routinely despite adequate fluid intake. Endorses urinary dribbling but denies dysuria or frequency. Denies fever, chills, N/V, chest pain, SOB, constipation, or diarrhea. Vitals notable for tachycardia to low 100's. Labs largely unremarkable.     Past Medical History  GERD, morbid obesity, biliary dyskinesia     Surgical History  Aforementioned procedure on 5/8/24  Gastric sleeve to bypass conversion 6/7/23     Social History  She reports that she has never smoked. She has never been exposed to tobacco smoke. She has never used smokeless tobacco. She reports that she does not drink alcohol and does not use drugs.    Family History  Family History   Problem Relation Name Age of Onset    Other (Diabetes mellitus) Mother delmonica     Diabetes Mother delmonica     No Known Problems Father      Prostate cancer Father's Brother      Hypertension Maternal Grandmother emmarine     Esophageal cancer Paternal Grandfather      Cancer Maternal Grandfather Mandie      Allergies  Patient has no known allergies.    Review of Systems  All systems  "reviewed and otherwise negative.     Physical Exam  General: NAD, resting comfortably  HEENT: NCAT  Resp: nonlabored breathing on RA  CV: tachycardic to low 100's on monitor  Abd: soft, nondistended, minimally TTP in RLQ  Skin: port sites well healing, small fading ecchymosis along RLQ below port site  : mild suprapubic tenderness  MSK: moves all extremities  Ext: no LE edema  Psych: appropriate mood and behavior     Last Recorded Vitals  Blood pressure (!) 135/93, pulse (!) 102, temperature 36.6 °C (97.9 °F), temperature source Temporal, resp. rate 18, height 1.676 m (5' 6\"), weight 97.5 kg (215 lb), SpO2 98%.    Relevant Results  CT A/P 6/6: Does not appear to demonstrate any acute intraabdominal processes. Fluid collection that was previously in gallbladder fossa has resolved. PENDING FINAL READ.    CT A/P 5/15:  Status post cholecystectomy. Fluid collection with gas focus at the gallbladder fossa, may be secondary to recent surgery. The sterility of the fluid collection cannot be assessed by CT; if there is clinical concern for superimposed infection, tissue sampling can be obtained for further evaluation. Please note that biloma cannot be excluded on this exam. Clinical correlation recommended.  Gas focus at the anterior abdomen, probably secondary to recent surgery.  Gas foci at the urinary bladder, please correlate with history of recent instrumentation. If there is none, then emphysematous cystitis is in the differential. Please correlate with urinalysis.  Small amount of free fluid at the pelvis.     Assessment/Plan   30 y/o F with hx of biliary dyskinesia and RNYGB who underwent a robotic-assisted lap yousuf with TEAGAN and resection of blind ending loop at the JJ anastomosis with closure of the JJ defect and reduction of internal hernia on 5/8 with Dr. Gan. Presents to the ED with RLQ abd pain and difficulty urinating.    Recommendations:  - No surgical interventions  - Follow up final read of CT A/P - if " negative, patient ok to follow up outpatient prn  - Follow up UA and Ucx  - Non-narcotic pain regimen for discharge: Tylenol, lidocaine patches, robaxin  - Dispo per ED if CT negative    Discussed with attending, Dr. Gan.    Ina Bowles MD  [facility]

## 2024-06-06 NOTE — Clinical Note
Cecelia Phillips was seen and treated in our emergency department on 6/6/2024.  She may return to work on 06/07/2024.       If you have any questions or concerns, please don't hesitate to call.      Lucien Rapp MD MPH

## 2024-06-06 NOTE — ED TRIAGE NOTES
Patient states she hasn't urinated in 2 days. States tonight is her first night back to work since her gallbladder surgery (May 8). States it was suppose to just be a gallbladder removal surgery but turned into gallbladder removal, small bowel resection and internal hernia. She developed a hematoma and was prescribed Tramadol and Oxycodone for her pain.

## 2024-06-07 LAB — HOLD SPECIMEN: NORMAL

## 2024-06-10 ENCOUNTER — PATIENT OUTREACH (OUTPATIENT)
Dept: CARE COORDINATION | Facility: CLINIC | Age: 29
End: 2024-06-10
Payer: COMMERCIAL

## 2024-06-10 NOTE — PROGRESS NOTES
UHACO KATELIN 30 day follow up:  Chart reviewed, call placed but unable to leave a message as the mailbox is full.  Unknown if Cecelia still has a smyth catheter after her ED visit 6/6 with dysuria.  She does have sx appt scheduled this week

## 2024-06-14 ENCOUNTER — OFFICE VISIT (OUTPATIENT)
Dept: SURGERY | Facility: CLINIC | Age: 29
End: 2024-06-14
Payer: COMMERCIAL

## 2024-06-14 VITALS — WEIGHT: 218.6 LBS | HEIGHT: 66 IN | BODY MASS INDEX: 35.13 KG/M2

## 2024-06-14 DIAGNOSIS — R11.0 NAUSEA: ICD-10-CM

## 2024-06-14 DIAGNOSIS — E66.9 OBESITY (BMI 30-39.9): ICD-10-CM

## 2024-06-14 DIAGNOSIS — R11.0 NAUSEA IN ADULT: ICD-10-CM

## 2024-06-14 DIAGNOSIS — K91.2 POSTOPERATIVE MALABSORPTION (HHS-HCC): ICD-10-CM

## 2024-06-14 DIAGNOSIS — Z98.84 S/P GASTRIC BYPASS: ICD-10-CM

## 2024-06-14 DIAGNOSIS — K21.9 GASTROESOPHAGEAL REFLUX DISEASE, UNSPECIFIED WHETHER ESOPHAGITIS PRESENT: ICD-10-CM

## 2024-06-14 DIAGNOSIS — Z90.49 S/P CHOLECYSTECTOMY: Primary | ICD-10-CM

## 2024-06-14 DIAGNOSIS — K59.00 CONSTIPATION, ACUTE: ICD-10-CM

## 2024-06-14 DIAGNOSIS — E55.9 VITAMIN D DEFICIENCY: ICD-10-CM

## 2024-06-14 RX ORDER — SUCRALFATE 1 G/10ML
1 SUSPENSION ORAL 4 TIMES DAILY
Qty: 420 ML | Refills: 2 | Status: SHIPPED | OUTPATIENT
Start: 2024-06-14 | End: 2024-07-16

## 2024-06-14 RX ORDER — ONDANSETRON 4 MG/1
4-8 TABLET, ORALLY DISINTEGRATING ORAL EVERY 8 HOURS PRN
Qty: 30 TABLET | Refills: 3 | Status: SHIPPED | OUTPATIENT
Start: 2024-06-14

## 2024-06-14 NOTE — PATIENT INSTRUCTIONS
"The following are the recommendations we discussed at your appointment today:  1. Nutrition  - Please make sure you are seeing the bariatric dietitian regularly.    Dietitian Information:   Michel Nickerson: [phone]  [facility] [phone]    Your Protein Goals will gradually increase as you get further out from surgery:  0-3 months - 60 GRAMS PER DAY  3-6 months - 60-75 GRAMS PER DAY  6-12 months - 75-90 GRAMS PER DAY  12+ months - 80-90 GRAMS PER DAY    - Follow Pouch Rules;.   Stop drinking 30 minutes before your meals, Take 30 minutes to eat your meal   - NO DRINKING DURING MEALS, Wait for 30 minutes after your meal to drink  - Eat 5 servings of fruits and veggies daily.  A serving is 1 small (tennis ball size) piece of whole fruit, 1/2 cup fresh fruit, 1 cup non starchy vegetables  - Eat 3 small meals and 1-2 healthy, protein rich, snacks per day.    EAT PROTEIN FIRST AT MEALS, 2nd non starchy vegetable or fruit serving, consume starches last and aim for whole grains of small portion.  This pattern of eating ensures you are getting full on high quality protein and higher fiber foods with many vitamins and minerals to support adequate nutrition first.      2. Exercise Recommendations:    Regular physical activity is CRITICAL for long term successful weight management.    Strive for a minimum weekly exercise goal of at least 200 minutes per week of aerobic exercise (45 minutes at least 5 days per week or 30 minutes daily)    Strength based resistance training is critical for helping to maintain and build lean body mass/muscle mass which is very important for long term health.  Having higher muscle mass is associated with better health outcomes and can help to maintain higher calorie expenditure.      Designing a Strength Program:  Select 3-4 exercises that target different major muscle groups.  - Emphasize the following movements \"pull, push, squat, hip hinge\"  \"Pull\" examples - pull up, " "bent over row or dumbbell row, pull down with weight bar or resistance band  \"Push\" examples - push up, bench press, chest flys, dips, overhead press, dumbbell bench press  \"Squat\" examples - air squat, chair squat, lunge steps, goblet squat, front squat, etc  \"Hip hinge\" examples - kettle bell swing, good morning, glute bridge, deadlift, suitcase pick ups, hip thrust    Perform two to three sets of eight to 15 repetitions of each exercise.  Try to use a resistance that feels like an \"8 out of 10\" effort, with 10 being the highest effort you can give.    To get stronger, try increasing either the weight, number of repetitions, number of sets or number of exercises every 4-8 weeks as you feel more comfortable with your current program.      3. Fluids  - Drink at least 60 oz of water every day.   - Wait 30 minutes before or after meals to drink fluids.  - Avoid carbonated beverages.    4. Vitamins  - Remember to take your multivitamins 2 times daily, once in the morning and once in the evening  - Take your calcium 2-3 times daily, at least 2 hours apart from the multivitamin - total daily dose at least 1200-1500mg per day.    - Vitamin D3 3000 units per day  - B12 - depending on your blood work and how well you absorb B12 from your multivitamin you may need additional B12 of 350-500mcg oral per day.    5. Labs  - We will check labs today and results will be communicated via [facility] My Chart  - A copy of the results can be viewed on  My Chart.      Follow-up with Dietitian for bariatric diet optimization  Follow-up with PCP for general health questions and ongoing management of routine health concerns  "

## 2024-06-14 NOTE — PROGRESS NOTES
[facility]  Comprehensive Weight [facility]        Name: Cecelia Phillips  MRN: [id number]     Index Surgery  Date of Surgery: 5/8/2024   Surgeon: Dr. Gan                   Surgical Procedure: Other: Lap cholecystectomy      Virtual or Telephone Consent:  A telephone visit (audio or visual only) between the patient (at the originating site) and the provider (at the distant site) was utilized to provide this telehealth service. Verbal consent was requested and obtained from Cecelia Phillips on this date, 06/14/24 for a telehealth visit.     HPI   Cecelia Phillips 29 year old female presenting today for a 6 week post op visit. Patient is s/p robotic assist cholecystectomy / small bowel resection / closure of internal peritoneum and TAP block.  Patient has a remote history of sleeve to bypass conversion 6/7/2023. Recent ED visit for urinary retention. She hasn't followed up with Urology since discharge however though because she thought problem resolved.     Diet:  - Stage: soft transitioning to regular food diet    Exercise:  - walking, increasing exercise as tolerated.     Concerns related to:  Nausea/Vomiting, Reflux: Denies, some issue with food feeling stuck / learning new fullness signals  Abdominal Pain: denies  Diarrhea/Constipation Denies    Primary Medical Hx:  Patient Active Problem List   Diagnosis    Acne    Allergies    Anxiety and depression    Attention deficit    Goiter    Gastroesophageal reflux disease    Hiatal hernia with gastroesophageal reflux    History of bariatric surgery    IUD contraception    Migraine without aura and responsive to treatment    Moderate episode of recurrent major depressive disorder (Multi)    PCOS (polycystic ovarian syndrome)    Vitamin D deficiency    Candidiasis of vagina    Constipation, acute    Dehydration    Iron deficiency anemia    Nausea in adult    Post-op pain    Post-operative nausea and vomiting    Postoperative malabsorption (HHS-HCC)     Psychological factors affecting medical condition    S/P gastric bypass    Dysphagia    SDH (subdural hematoma) (Multi)    S/P cholecystectomy      Past Surgical History:   Procedure Laterality Date    ADENOIDECTOMY      BARIATRIC SURGERY      CHOLECYSTECTOMY  05/08/2024    OTHER SURGICAL HISTORY  01/03/2022    Sleeve gastrectomy    TONSILLECTOMY  11/02/2015    Tonsillectomy With Adenoidectomy      Social History     Socioeconomic History    Marital status: Single     Spouse name: Not on file    Number of children: Not on file    Years of education: Not on file    Highest education level: Not on file   Occupational History    Occupation: Nurse   Tobacco Use    Smoking status: Never     Passive exposure: Never    Smokeless tobacco: Never   Vaping Use    Vaping status: Never Used   Substance and Sexual Activity    Alcohol use: Never    Drug use: Never    Sexual activity: Not Currently     Partners: Male     Birth control/protection: I.U.D.   Other Topics Concern    Not on file   Social History Narrative    Not on file     Social Determinants of Health     Financial Resource Strain: Low Risk  (5/8/2024)    Overall Financial Resource Strain (CARDIA)     Difficulty of Paying Living Expenses: Not hard at all   Food Insecurity: Not on file   Transportation Needs: No Transportation Needs (5/9/2024)    PRAPARE - Transportation     Lack of Transportation (Medical): No     Lack of Transportation (Non-Medical): No   Physical Activity: Not on file   Stress: Not on file   Social Connections: Not on file   Intimate Partner Violence: Not on file   Housing Stability: Low Risk  (5/9/2024)    Housing Stability Vital Sign     Unable to Pay for Housing in the Last Year: No     Number of Places Lived in the Last Year: 1     Unstable Housing in the Last Year: No     Family History   Problem Relation Name Age of Onset    Other (Diabetes mellitus) Mother delmonica     Diabetes Mother delmonica     No Known Problems Father      Prostate  "cancer Father's Brother      Hypertension Maternal Grandmother emmarine     Esophageal cancer Paternal Grandfather      Cancer Maternal Grandfather Mandie       Current Outpatient Medications on File Prior to Visit   Medication Sig Dispense Refill    calcium carbonate-vitamin D3 1,000 mg-20 mcg (800 unit) tablet Take by mouth.      ergocalciferol (Vitamin D-2) 1.25 MG (59368 UT) capsule Take 1 capsule twice weekly x 8 weeks then start Vitamin D3 2000 units daily over the counter      ferrous gluconate 236 mg (27 mg iron) tablet Take by mouth.      hydrOXYzine HCL (Atarax) 25 mg tablet Take 2 tablets (50 mg) by mouth once daily at bedtime.      levonorgestrel (Mirena) 21 mcg/24 hours (8 yrs) 52 mg IUD by intrauterine route.      multivitamin-min-iron-FA-vit K (Adults Multivitamin) 18 mg iron-400 mcg-25 mcg tablet Take by mouth.      omeprazole (PriLOSEC) 40 mg DR capsule Take 1 capsule (40 mg) by mouth once daily in the morning. Take before meals. Please remove capsule and use granules and mix with sugar free pudding or jello 30 capsule 0    [] ondansetron ODT (Zofran-ODT) 4 mg disintegrating tablet Take 1 tablet (4 mg) by mouth every 8 hours if needed for nausea or vomiting. 30 tablet 2    promethazine (Phenergan) 12.5 mg tablet Take 1 tablet (12.5 mg) by mouth every 6 hours if needed for nausea.      Vitamin B-1 100 mg tablet Take 0.5 tablets (50 mg) by mouth once daily.      [DISCONTINUED] ondansetron ODT (Zofran-ODT) 4 mg disintegrating tablet Take 1-2 tablets (4-8 mg) by mouth every 8 hours if needed for nausea or vomiting. 30 tablet 3    [DISCONTINUED] ursodiol (ANGELO 250) 250 mg tablet Take 1 tablet (250 mg) by mouth 2 times a day. (Patient not taking: Reported on 2024) 60 tablet 2     No current facility-administered medications on file prior to visit.      No Known Allergies    REVIEW OF SYSTEMS:  Negative unless otherwise reviewed in HPI.    PHYSICAL EXAM   Physical Exam   Ht: 5'6\"  Wt: 218 " lbs BMI: Body mass index is 35.28 kg/m².   General- No acute distress, well appearing and well nourished.   Eyes Conjunctiva and lids: No erythema, swelling or discharge  Neck appears supple  CV: self reports reg rate/rhythm  Pulmonary: Respiratory effort: Normal respiration. unlabored  Abdomen: Central adiposity  Neurologic - Coordination: Not assessed.   Extremities: No clubbing, cyanosis  Psychiatric - Orientation to person, place, and time: Normal. Mood and affect: Normal.    ASSESSMENT & PLAN  29 y.o. female presenting for 6 week pov s/p bariatric revision and lap yousuf. Doing okay. Some issues with nausea, reflux/feeling like food is getting stuck. Still needs to meet with bariatric RD. Prn zofran for nausea. Taking PPI BID since before surgery. Discussed use of carafate to see if it helps alleviate symptoms epigastric discomfort and food feeling stuck. BM are mostly regular. Labs ordered for 3 mo follow up.     Weight Impression:  -Initial Weight: 250 lbs  -Today's Weight: 218  -%Total Weight Loss: -13%    Problem List Items Addressed This Visit       Gastroesophageal reflux disease    Relevant Medications    sucralfate (Carafate) 100 mg/mL suspension    Other Relevant Orders    Comprehensive Metabolic Panel    Vitamin D deficiency    Relevant Orders    Vitamin D 25-Hydroxy,Total (for eval of Vitamin D levels)    Constipation, acute    Relevant Orders    Comprehensive Metabolic Panel    Nausea in adult    Relevant Medications    ondansetron ODT (Zofran-ODT) 4 mg disintegrating tablet    Other Relevant Orders    Comprehensive Metabolic Panel    Postoperative malabsorption (HHS-HCC)    Relevant Orders    Zinc, Serum or Plasma    Vitamin B12    Vitamin A    Vitamin K    Ferritin    Vitamin B6    Iron and TIBC    Vitamin B1, Whole Blood    Folate    Copper, Blood    CBC    S/P gastric bypass    Relevant Medications    ondansetron ODT (Zofran-ODT) 4 mg disintegrating tablet    sucralfate (Carafate) 100 mg/mL  suspension    Other Relevant Orders    Comprehensive Metabolic Panel    CBC    S/P cholecystectomy - Primary     6 wks s/p lap yousuf with Dr. Gan  Doing well  No acute issues / concerns           Relevant Orders    Comprehensive Metabolic Panel    CBC     Other Visit Diagnoses       Nausea        Relevant Medications    ondansetron ODT (Zofran-ODT) 4 mg disintegrating tablet    Obesity (BMI 30-39.9)        Relevant Orders    TSH with reflex to Free T4 if abnormal    Lipid Panel    Parathyroid Hormone, Intact           Please see Patient Instructions for today's relevant referrals, orders and instructions.  > 50% of time spent counseling on lifestyle modifications to support weight loss.      Follow Up: Follow up in about 4 months (around 10/14/2024).     Dana Borrego, APRN-CNP

## 2024-08-22 DIAGNOSIS — R39.9 UTI SYMPTOMS: Primary | ICD-10-CM

## 2024-08-22 RX ORDER — NITROFURANTOIN 25; 75 MG/1; MG/1
100 CAPSULE ORAL 2 TIMES DAILY
Qty: 14 CAPSULE | Refills: 0 | Status: SHIPPED | OUTPATIENT
Start: 2024-08-22 | End: 2024-08-29

## 2024-09-17 ENCOUNTER — PATIENT MESSAGE (OUTPATIENT)
Dept: PRIMARY CARE | Facility: CLINIC | Age: 29
End: 2024-09-17
Payer: COMMERCIAL

## 2024-09-17 DIAGNOSIS — F41.9 ANXIETY DISORDER, UNSPECIFIED TYPE: Primary | ICD-10-CM

## 2024-09-17 RX ORDER — HYDROXYZINE HYDROCHLORIDE 50 MG/1
50 TABLET, FILM COATED ORAL NIGHTLY
Qty: 90 TABLET | Refills: 0 | Status: SHIPPED | OUTPATIENT
Start: 2024-09-17

## 2024-10-12 PROBLEM — Z71.3 DIETARY COUNSELING: Status: ACTIVE | Noted: 2024-10-12

## 2024-10-14 ENCOUNTER — APPOINTMENT (OUTPATIENT)
Dept: SURGERY | Facility: CLINIC | Age: 29
End: 2024-10-14
Payer: COMMERCIAL

## 2024-10-16 DIAGNOSIS — R39.9 URINARY TRACT INFECTION SYMPTOMS: Primary | ICD-10-CM

## 2024-10-16 RX ORDER — NITROFURANTOIN 25; 75 MG/1; MG/1
100 CAPSULE ORAL 2 TIMES DAILY
Qty: 14 CAPSULE | Refills: 0 | Status: SHIPPED | OUTPATIENT
Start: 2024-10-16 | End: 2024-10-23

## 2024-10-29 ENCOUNTER — APPOINTMENT (OUTPATIENT)
Dept: PRIMARY CARE | Facility: CLINIC | Age: 29
End: 2024-10-29
Payer: COMMERCIAL

## 2024-10-29 PROBLEM — R41.840 ATTENTION DEFICIT: Status: RESOLVED | Noted: 2023-04-27 | Resolved: 2024-10-29

## 2024-10-29 PROBLEM — G47.9 DIFFICULTY SLEEPING: Status: ACTIVE | Noted: 2024-10-29

## 2024-12-07 DIAGNOSIS — F41.9 ANXIETY DISORDER, UNSPECIFIED TYPE: ICD-10-CM

## 2024-12-11 RX ORDER — HYDROXYZINE HYDROCHLORIDE 50 MG/1
50 TABLET, FILM COATED ORAL NIGHTLY
Qty: 90 TABLET | Refills: 0 | Status: SHIPPED | OUTPATIENT
Start: 2024-12-11

## 2024-12-31 ENCOUNTER — TELEMEDICINE (OUTPATIENT)
Dept: PRIMARY CARE | Facility: CLINIC | Age: 29
End: 2024-12-31
Payer: COMMERCIAL

## 2024-12-31 DIAGNOSIS — J18.9 PNEUMONIA OF LEFT LOWER LOBE DUE TO INFECTIOUS ORGANISM: Primary | ICD-10-CM

## 2024-12-31 PROCEDURE — 99213 OFFICE O/P EST LOW 20 MIN: CPT | Performed by: FAMILY MEDICINE

## 2024-12-31 RX ORDER — MOXIFLOXACIN HYDROCHLORIDE 400 MG/1
400 TABLET ORAL DAILY
Qty: 10 TABLET | Refills: 0 | Status: SHIPPED | OUTPATIENT
Start: 2024-12-31 | End: 2025-01-10

## 2024-12-31 RX ORDER — FLUCONAZOLE 150 MG/1
150 TABLET ORAL ONCE
Qty: 1 TABLET | Refills: 0 | Status: SHIPPED | OUTPATIENT
Start: 2024-12-31 | End: 2024-12-31

## 2024-12-31 NOTE — PROGRESS NOTES
Virtual or Telephone Consent    An interactive audio and video telecommunication system which permits real time communications between the patient (at the originating site) and provider (at the distant site) was utilized to provide this telehealth service.   Verbal consent was requested and obtained from Cecelia Phillips on this date, 12/31/24 for a telehealth visit.        Chief Complaint:  No chief complaint on file.  History Of Present Illness:   Cecelia Phillips is a 29 y.o. female       Virtual sick visit.      2 weeks ago- pneumonia- took antibiotics (doxy). But it didn't help much.  Inhaler and steroid also rx'd.   Still has SOB and cough and sore throat. Dayquil, nyquil, cough drops.   CXR: 12/7/24- possible left lower lobe PNA.            Healthcare team:  - PCP: Dr. 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       Tobacco  Packs per day/years/quit date- never              Review of Systems (BOLD if positive, delete if not asked):     Constitutional:   - fever   - chills   - night sweats  - unexpected weight change  - changes in sleep     Eyes:   - loss of vision  - double vision  - floaters     Ear/Nose/Throat/Mouth:   - sore throat  - hearing changes  - sinus congestion     Cardiovascular:   - chest pain  - chest heaviness  - palpitations  - swelling in ankles     Musculoskeletal:   - bone pain  - muscle pain  - joint pain     Respiratory:   - shortness of breath  - difficulty breathing  - frequent cough  - wheezing     Neurological:   - loss of consciousness  - tremors  - dizzy spells  - numbness   - tingling     Gastrointestinal:   - abdominal pain  - nausea  - vomiting  - constipation  - diarrhea  - bloody stools  - loss of bowel control  - indigestion  - heartburn  - sour taste in throat when waking up     Genitourinary:   - urinary incontinence  - increased urinary frequency  - painful urination  - blood in urine     Skin:   - Rash  - lumps or bumps  - worrisome  moles     Endocrine:   - excessive thirst  - feeling too hot  - feeling too cold  - feeling tired  - fatigue    Hematologic/Lymphatic:   - swollen glands  - blood clotting problems  - easy bruising.         Psychological:   - feeling generally happy  - feeling safe at home          Last Recorded Vitals:  There were no vitals filed for this visit.     Past Medical History:  She has a past medical history of Acute streptococcal tonsillitis, unspecified (09/04/2019), Anxiety, Biliary colic (12/22/2023), Cholecystitis (05/08/2024), Colic, biliary (05/08/2024), Depression, Encounter for contraceptive management, unspecified (05/20/2020), Encounter for insertion of intrauterine contraceptive device (06/17/2020), Encounter for insertion of intrauterine contraceptive device (02/09/2016), Encounter for insertion of intrauterine contraceptive device (11/16/2015), Encounter for pregnancy test, result negative (02/09/2016), Encounter for screening for infections with a predominantly sexual mode of transmission (02/09/2016), Gallbladder sludge (12/01/2023), GERD (gastroesophageal reflux disease), Headache, Inadequate sleep hygiene (04/15/2021), Low back pain, unspecified (06/15/2017), Morbid (severe) obesity due to excess calories (Multi) (07/06/2020), Nontoxic goiter, unspecified (04/18/2021), Other specified anxiety disorders (11/09/2015), Other specified disorders of eustachian tube, right ear (08/08/2019), Papillomavirus as the cause of diseases classified elsewhere (09/21/2017), Personal history of diseases of the skin and subcutaneous tissue (08/09/2019), Personal history of diseases of the skin and subcutaneous tissue (02/26/2016), Personal history of diseases of the skin and subcutaneous tissue (02/26/2016), Personal history of other diseases of the female genital tract (01/06/2017), Personal history of other diseases of the female genital tract (12/07/2015), Personal history of other diseases of the female genital tract  (11/09/2015), Personal history of other diseases of the female genital tract (10/11/2018), Personal history of other diseases of the respiratory system (04/28/2016), Personal history of other diseases of the respiratory system (04/28/2016), Personal history of other diseases of the respiratory system (10/08/2021), Personal history of other endocrine, nutritional and metabolic disease (12/07/2015), Personal history of other infectious and parasitic diseases (10/14/2021), Personal history of other specified conditions (11/16/2015), Persons encountering health services in other specified circumstances (08/09/2019), Right upper quadrant pain (04/28/2016), Somnolence (04/15/2021), and Strain of muscle, fascia and tendon of lower back, initial encounter (10/10/2016).     Past Surgical History:  She has a past surgical history that includes Tonsillectomy (11/02/2015); Other surgical history (01/03/2022); Adenoidectomy; Bariatric Surgery; and Cholecystectomy (05/08/2024).     Social History:  She reports that she has never smoked. She has never been exposed to tobacco smoke. She has never used smokeless tobacco. She reports that she does not drink alcohol and does not use drugs.     Family History:  Family History   Problem Relation Name Age of Onset    Other (Diabetes mellitus) Mother shelby     Diabetes Mother shelby     No Known Problems Father      Prostate cancer Father's Brother      Hypertension Maternal Grandmother emmarine     Esophageal cancer Paternal Grandfather      Cancer Maternal Grandfather Mandie      Allergies:  Patient has no known allergies.     Outpatient Medications:  Current Outpatient Medications   Medication Instructions    calcium carbonate-vitamin D3 1,000 mg-20 mcg (800 unit) tablet oral    ergocalciferol (Vitamin D-2) 1.25 MG (52785 UT) capsule Take 1 capsule twice weekly x 8 weeks then start Vitamin D3 2000 units daily over the counter    ferrous gluconate 236 mg (27 mg iron) tablet oral     hydrOXYzine HCL (ATARAX) 50 mg, oral, Nightly    levonorgestrel (Mirena) 21 mcg/24 hours (8 yrs) 52 mg IUD intrauterine    multivitamin-min-iron-FA-vit K (Adults Multivitamin) 18 mg iron-400 mcg-25 mcg tablet oral    omeprazole (PRILOSEC) 40 mg, oral, Daily before breakfast, Please remove capsule and use granules and mix with sugar free pudding or jello    ondansetron ODT (ZOFRAN-ODT) 4-8 mg, oral, Every 8 hours PRN    promethazine (PHENERGAN) 12.5 mg, oral, Every 6 hours PRN    Vitamin B-1 50 mg, oral, Daily        Physical Exam:  GENERAL: Well developed, well nourished, alert and cooperative, and appears to be in no acute distress.     PSYCH: mood pleasant and appropriate     HEAD: normocephalic     EYES: PERRL, EOMI. vision is grossly intact.            LUNGS: Good respiratory effort.ABD: soft, nontender      Last Labs:  CBC -  Lab Results   Component Value Date    WBC 6.7 06/06/2024    HGB 11.7 (L) 06/06/2024    HCT 35.8 (L) 06/06/2024    MCV 75 (L) 06/06/2024     06/06/2024        CMP -  Lab Results   Component Value Date    CALCIUM 9.0 06/06/2024    PROT 7.2 06/06/2024    ALBUMIN 3.9 06/06/2024    AST 14 06/06/2024    ALT 18 06/06/2024    ALKPHOS 76 06/06/2024    BILITOT 0.4 06/06/2024        LIPID PANEL -  Lab Results   Component Value Date    CHOL 173 04/24/2023    TRIG 111 04/24/2023    HDL 41.9 04/24/2023    CHHDL 4.1 04/24/2023    LDLF 109 (H) 04/24/2023    VLDL 22 04/24/2023           Lab Results   Component Value Date    BNP 26 05/15/2024    HGBA1C 5.5 09/05/2023    HGBA1C 5.7 (H) 05/26/2021          CT abdomen pelvis w IV contrast  Narrative: STUDY:  CT Abdomen and Pelvis with IV Contrast; 06/06/2024 4:39 AM  INDICATION:  RUQ and RLQ pain.  Recent bowel resection.  Cholecystectomy.  COMPARISON:  CT AP 05/15/2024, US gallbladder 11/29/2023.  ACCESSION NUMBER(S):  [id number]  ORDERING CLINICIAN:  DARNELL PHILLIPS  TECHNIQUE:  CT of the abdomen and pelvis was performed.  Contiguous axial  images  were obtained at 3 mm slice thickness through the abdomen and pelvis.   Coronal and sagittal reconstructions at 3 mm slice thickness were  performed.  Omnipaque 350 80 mL was administered intravenously.    FINDINGS:  LOWER CHEST:  No cardiomegaly.  No pericardial effusion.  Lung bases are clear.     ABDOMEN:     LIVER:  No hepatomegaly.  Smooth surface contour.  Normal attenuation.     BILE DUCTS:  No intrahepatic or extrahepatic biliary ductal dilatation.     GALLBLADDER:  Gallbladder is surgically absent.  Minimal residual fluid is suggested  within the gallbladder fossa.  No distinct abscess.    STOMACH:  Postoperative changes of gastric bypass surgery are noted.       PANCREAS:  No masses or ductal dilatation.     SPLEEN:  No splenomegaly or focal splenic lesion.     ADRENAL GLANDS:  No thickening or nodules.     KIDNEYS AND URETERS:  Kidneys are normal in size and location.  No renal or ureteral  calculi.     PELVIS:     BLADDER:  No abnormalities identified.     REPRODUCTIVE ORGANS:  Uterus is present with IUD in place.     BOWEL:  Appendix is within normal limits.  Right lower abdominal bowel  anastomosis is grossly intact and unchanged.       VESSELS:  No abnormalities identified.  Abdominal aorta is normal in caliber.      PERITONEUM/RETROPERITONEUM/LYMPH NODES:  No free fluid.  No pneumoperitoneum.  No lymphadenopathy.     ABDOMINAL WALL:  No abnormalities identified.  SOFT TISSUES:   No abnormalities identified.     BONES:  No acute fracture or aggressive osseous lesion.  Impression: Cholecystectomy has been performed.  Minimal residual fluid is  suggested within the gallbladder fossa, likely related to the recent  surgery.  No distinct abscess identified.  No distinct acute intra-abdominal or pelvic process otherwise  demonstrated.  Signed by Jake Gould MD  ECG 12 lead  Normal sinus rhythm  Normal ECG  When compared with ECG of 15-MAY-2024 16:39,  PREVIOUS ECG IS PRESENT  See ED provider note  for full interpretation and clinical correlation  Confirmed by Jodi Crow ([id number]) on 6/6/2024 4:41:13 AM         Assessment/Plan   Problem List Items Addressed This Visit    None  Visit Diagnoses         Codes    Pneumonia of left lower lobe due to infectious organism    -  Primary J18.9          New virtual visit.    Pneumonia potentially seen on December 7 x-ray.  Continued symptoms was previously placed on doxycycline.    Agreed on Avelox antibiotic as well as Diflucan pill in case she gets a yeast infection.    Could consider a follow-up chest x-ray.    Otherwise follow-up as needed.     Ritesh Collins, DO

## 2025-02-21 DIAGNOSIS — F41.9 ANXIETY DISORDER, UNSPECIFIED TYPE: ICD-10-CM

## 2025-02-21 RX ORDER — HYDROXYZINE HYDROCHLORIDE 50 MG/1
50 TABLET, FILM COATED ORAL NIGHTLY
Qty: 30 TABLET | Refills: 0 | Status: SHIPPED | OUTPATIENT
Start: 2025-02-21

## 2025-04-14 ENCOUNTER — APPOINTMENT (OUTPATIENT)
Dept: OBSTETRICS AND GYNECOLOGY | Facility: CLINIC | Age: 30
End: 2025-04-14
Payer: COMMERCIAL

## 2025-05-01 ENCOUNTER — APPOINTMENT (OUTPATIENT)
Facility: CLINIC | Age: 30
End: 2025-05-01
Payer: COMMERCIAL

## 2025-05-06 NOTE — PROGRESS NOTES
BARIATRIC SUR[facility]ERY CLINIC  Comprehensive Weight Management        Name: Cecelia Phillips  MRN: [id number]     Index Surgery  Date of Surgery: 5/8/2024   Surgeon: Dr. Gan                   Surgical Procedure: Other: Lap cholecystectomy      Virtual or Telephone Consent  An interactive audio and video telecommunication system which permits real time communications between the patient (at the originating site) and provider (at the distant site) was utilized to provide this telehealth service.   Verbal consent was requested and obtained from Cecelia Phillips on this date, 05/07/25 for a telehealth visit and the patient's location was confirmed at the time of the visit.    HPI    Cecelia Phillips is a 30 y.o. female presenting today for comprehensive obesity management follow up. She is s/p robotic assisted cholecystectomy and small bowel resection with closure of internal peritoneum. Prior to this she has a remote history of sleeve to bypass conversion 06/07/2023. She has had ongoing issue with nausea and vomiting but this resolved following gallbladder removal. She was then lost to follow up June 2024 and today seeking to re-establish care. She met with her bariatric dietitian yesterday.    Diet:  - Stage: regular food diet  - Achieving protein and fluid goals most days: no, fluids close to 60 oz, protein intake is low  - having nausea with meals, uncertain if related to malabsorption or anxiety. She states she ran out of zofran which had been helping. She is using protein shakes again to supplement.    Exercise:  - walking, increasing exercise as tolerated.     Concerns related to:  Nausea/Vomiting, Reflux: endorses nausea with some vomiting, 2-3x/week   Abdominal Pain:intermittent right side pain, not like gallbladder pain she had prior  Diarrhea/Constipation Denies    Daily Supplements:  Calcium: Calcium Citrate w/ vitamin D (1200 - 1500mg)  Multivitamin & Minerals: 2 per day  Vitamin B12: 500 mcg/day   Vitamin D3: 3000  units   Iron/Other: iron supplement   PPI: taking    Primary Medical Hx:  Patient Active Problem List   Diagnosis    Acne    Allergies    Anxiety and depression    Goiter    Gastroesophageal reflux disease    Hiatal hernia with gastroesophageal reflux    History of bariatric surgery    IUD contraception    Migraine without aura and responsive to treatment    Moderate episode of recurrent major depressive disorder    PCOS (polycystic ovarian syndrome)    Vitamin D deficiency    Candidiasis of vagina    Constipation, acute    Dehydration    Iron deficiency anemia    Nausea in adult    Post-op pain    Post-operative nausea and vomiting    Postoperative malabsorption (HHS-HCC)    Psychological factors affecting medical condition    S/P gastric bypass    Dysphagia    SDH (subdural hematoma) (Multi)    S/P cholecystectomy    Dietary counseling    Difficulty sleeping      Past Surgical History:   Procedure Laterality Date    ADENOIDECTOMY      BARIATRIC SURGERY      CHOLECYSTECTOMY  05/08/2024    OTHER SURGICAL HISTORY  01/03/2022    Sleeve gastrectomy    TONSILLECTOMY  11/02/2015    Tonsillectomy With Adenoidectomy      Social History     Socioeconomic History    Marital status: Single     Spouse name: Not on file    Number of children: Not on file    Years of education: Not on file    Highest education level: Not on file   Occupational History    Occupation: Nurse   Tobacco Use    Smoking status: Never     Passive exposure: Never    Smokeless tobacco: Never   Vaping Use    Vaping status: Never Used   Substance and Sexual Activity    Alcohol use: Never    Drug use: Never    Sexual activity: Not Currently     Partners: Male     Birth control/protection: I.U.D.   Other Topics Concern    Not on file   Social History Narrative    Not on file     Social Drivers of Health     Financial Resource Strain: Low Risk  (5/8/2024)    Overall Financial Resource Strain (CARDIA)     Difficulty of Paying Living Expenses: Not hard at all    Food Insecurity: Not on file   Transportation Needs: No Transportation Needs (5/9/2024)    PRAPARE - Transportation     Lack of Transportation (Medical): No     Lack of Transportation (Non-Medical): No   Physical Activity: Not on file   Stress: Not on file   Social Connections: Not on file   Intimate Partner Violence: Not on file   Housing Stability: Low Risk  (5/9/2024)    Housing Stability Vital Sign     Unable to Pay for Housing in the Last Year: No     Number of Places Lived in the Last Year: 1     Unstable Housing in the Last Year: No     Family History   Problem Relation Name Age of Onset    Other (Diabetes mellitus) Mother shelby     Diabetes Mother shelby     No Known Problems Father      Prostate cancer Father's Brother      Hypertension Maternal Grandmother emmarine     Esophageal cancer Paternal Grandfather      Cancer Maternal Grandfather Mandie       Current Outpatient Medications on File Prior to Visit   Medication Sig Dispense Refill    calcium carbonate-vitamin D3 1,000 mg-20 mcg (800 unit) tablet Take by mouth.      ergocalciferol (Vitamin D-2) 1.25 MG (52135 UT) capsule Take 1 capsule twice weekly x 8 weeks then start Vitamin D3 2000 units daily over the counter      ferrous gluconate 236 mg (27 mg iron) tablet Take by mouth.      hydrOXYzine HCL (Atarax) 50 mg tablet Take 1 tablet (50 mg) by mouth once daily at bedtime. 30 tablet 0    levonorgestrel (Mirena) 21 mcg/24 hours (8 yrs) 52 mg IUD by intrauterine route.      multivitamin-min-iron-FA-vit K (Adults Multivitamin) 18 mg iron-400 mcg-25 mcg tablet Take by mouth.      omeprazole (PriLOSEC) 40 mg DR capsule Take 1 capsule (40 mg) by mouth once daily in the morning. Take before meals. Please remove capsule and use granules and mix with sugar free pudding or jello 30 capsule 0    promethazine (Phenergan) 12.5 mg tablet Take 1 tablet (12.5 mg) by mouth every 6 hours if needed for nausea.      Vitamin B-1 100 mg tablet Take 0.5 tablets (50  "mg) by mouth once daily.      [DISCONTINUED] ondansetron ODT (Zofran-ODT) 4 mg disintegrating tablet Take 1-2 tablets (4-8 mg) by mouth every 8 hours if needed for nausea or vomiting. 30 tablet 3     No current facility-administered medications on file prior to visit.      No Known Allergies    REVIEW OF SYSTEMS:  Negative unless otherwise reviewed in HPI.    PHYSICAL EXAM   Physical Exam   Ht: 5'6\"  Wt: 225 lbs   BMI: 36.32  Alert, well appearing, no acute distress, nourished, hydrated.  Anicteric sclera, no ptosis  Facial symmetry  Neck supple  Unlabored respirations.  Easily conversant without increased respiratory effort  Oriented to person, place, time.  Judgement intact.  Appropriate mood, affect.     ASSESSMENT & PLAN   30 y.o. female presenting for comprehensive obesity management follow up. Lost to care prior to six month pov s/p revision LSG to RYGB, gallbladder removal. At her last visit she had some ongoing issues with nausea and feeling like food was getting stuck causing her to vomit. We discussed given this recurrence potential stricture. She also endorses high stress and anxiety with work, most times if she vomits its during her work shift. Counseled on need to eat slowly, chew thoroughly. Discussed resources for disordered eating behaviors. She needs follow up with bariatric dietitian. Will hold off on imaging at this time per her request. She will complete annual micronutrient labs. Refill of zofran given. She is achieving intake goal for fluids, just issue with solid food/meats lately. She's returned to more soft food diet. Discussed protein target per meal. She continues to use PPI as needed. BM are regular. Follow up with this office in 2-4 weeks, sooner if needed. Reviewed red flag symptoms requiring urgent/emergent evaluation for hydration, nutrition support.     Weight Impression:  - Initial Weight: 250 lbs  - Geovanni Weight 218 lbs   - Today's Weight: 225 lbs  - %Total Weight Loss: -10 % "     Problem List Items Addressed This Visit       Iron deficiency anemia    - labs, see orders  - taking MVI with iron, no additional iron supplement at this time         Post-operative nausea and vomiting    - prn zofran given  - counseled on need to chew thoroughly eat slowly separate liquids and solid  - will follow up with bariatric dietitian.         Relevant Medications    ondansetron ODT (Zofran-ODT) 4 mg disintegrating tablet    Postoperative malabsorption (HHS-HCC) - Primary    Annual RYGB micronutrient lab orders placed- please complete labs.   Adjust micronutrient supplementation per results  Continue recommended post bariatric surgery supplements            Relevant Medications    ondansetron ODT (Zofran-ODT) 4 mg disintegrating tablet    Other Relevant Orders    Zinc, Serum or Plasma    Vitamin B12    Vitamin A    Vitamin K    TSH with reflex to Free T4 if abnormal    Ferritin    Vitamin B6    Iron and TIBC    Vitamin D 25-Hydroxy,Total (for eval of Vitamin D levels)    Parathyroid Hormone, Intact    Vitamin B1, Whole Blood    Folate    Copper, Blood    S/P gastric bypass    S/p RYGB (revision sleeve to RYGB) 6/7/23.    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   Last EGD 11/28/2023 showed no further evidence of stricture, ulcer or acute pathology.      Today she reports ongoing issue with dietary tolerance and frequent nausea that triggers occas vomiting episodes. She reports this happens more at night while working, thinks partially related to stress and or anxiety.     She requests refill of zofran. She states zofran helps if taken before meals at times.   - s/p yousuf.  She still endorses some RUQ pain intermittently.     Taking appropriate supplements most days, some days she says she forgets.   She takes otc PPI.      Plan:  -Continue soft/pureed foods if unable to tolerate advanced diet. Continue to use protein shakes to meet nutritional needs and  review high protein snack ideas. Continue to get minimum 64 oz water.   - Continue to track fluids & protein to ensure adequate hydration.  - Reviewed red flag symptoms requiring urgent/emergent evaluation for hydration, nutrition support.  - Complete annual labs, follow up with your dietitian, follow up with this office in 4 weeks.          Relevant Orders    Lipid Panel    Comprehensive Metabolic Panel    CBC    Dietary counseling    - Discussed recent food intolerances. Assessed 24 hr recall. She is some days only getting ~30 g/ protein. She has started supplementing with shakes again. 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 She has been lost to care most of the past year. Needs follow up with RD. We reviewed high protein snack ideas and bariatric basics (30-30-30 rule), need to chew slowly and thoroughly.   - Counseled on dietary modifications to support weight reduction, reduced calorie diet, encourage adequate protein, increased dietary fiber from fruit and vegetable to improve appetite/satiety regulation and quality of diet.  Discussed impact of processed foods and liquid calories to weight gain & contribution to dysfunctional appetite signals.   -  Reviewed importance of intensification of lifestyle therapy in weight reduction and maintenance, set behavioral modification goals of nutrition, physical activity or behavioral practices to benefit weight reduction.    - Discussed self monitoring practices to ensure reduce calorie diet, emphasizing increased dietary protein & fiber.  Reviewed protein targets per meal as recommendation to help with satiety and lean mass maintenance.           Relevant Orders    Lipid Panel    Comprehensive Metabolic Panel   Please see Patient Instructions for today's relevant referrals, orders and instructions.  > 50% of time spent counseling on lifestyle modifications to support weight loss.      Follow Up: Follow up in about 4 weeks (around  6/4/2025).     Dana Borrego, APRN-CNP

## 2025-05-07 ENCOUNTER — TELEMEDICINE (OUTPATIENT)
Dept: SURGERY | Facility: CLINIC | Age: 30
End: 2025-05-07
Payer: COMMERCIAL

## 2025-05-07 VITALS — WEIGHT: 225 LBS | HEIGHT: 66 IN | BODY MASS INDEX: 36.16 KG/M2

## 2025-05-07 DIAGNOSIS — K91.2 POSTOPERATIVE MALABSORPTION (HHS-HCC): Primary | ICD-10-CM

## 2025-05-07 DIAGNOSIS — Z71.3 DIETARY COUNSELING: ICD-10-CM

## 2025-05-07 DIAGNOSIS — R11.2 POST-OPERATIVE NAUSEA AND VOMITING: ICD-10-CM

## 2025-05-07 DIAGNOSIS — Z98.84 S/P GASTRIC BYPASS: ICD-10-CM

## 2025-05-07 DIAGNOSIS — Z98.890 POST-OPERATIVE NAUSEA AND VOMITING: ICD-10-CM

## 2025-05-07 DIAGNOSIS — D50.8 IRON DEFICIENCY ANEMIA SECONDARY TO INADEQUATE DIETARY IRON INTAKE: ICD-10-CM

## 2025-05-07 PROCEDURE — 3008F BODY MASS INDEX DOCD: CPT

## 2025-05-07 PROCEDURE — 99214 OFFICE O/P EST MOD 30 MIN: CPT | Mod: 25,95

## 2025-05-07 PROCEDURE — 99401 PREV MED CNSL INDIV APPRX 15: CPT

## 2025-05-07 PROCEDURE — 99214 OFFICE O/P EST MOD 30 MIN: CPT

## 2025-05-07 RX ORDER — ONDANSETRON 4 MG/1
4-8 TABLET, ORALLY DISINTEGRATING ORAL EVERY 8 HOURS PRN
Qty: 30 TABLET | Refills: 3 | Status: SHIPPED | OUTPATIENT
Start: 2025-05-07

## 2025-05-07 NOTE — ASSESSMENT & PLAN NOTE
- Discussed recent food intolerances. Assessed 24 hr recall. She is some days only getting ~30 g/ protein. She has started supplementing with shakes again. 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 She has been lost to care most of the past year. Needs follow up with RD. We reviewed high protein snack ideas and bariatric basics (30-30-30 rule), need to chew slowly and thoroughly.   - Counseled on dietary modifications to support weight reduction, reduced calorie diet, encourage adequate protein, increased dietary fiber from fruit and vegetable to improve appetite/satiety regulation and quality of diet.  Discussed impact of processed foods and liquid calories to weight gain & contribution to dysfunctional appetite signals.   -  Reviewed importance of intensification of lifestyle therapy in weight reduction and maintenance, set behavioral modification goals of nutrition, physical activity or behavioral practices to benefit weight reduction.    - Discussed self monitoring practices to ensure reduce calorie diet, emphasizing increased dietary protein & fiber.  Reviewed protein targets per meal as recommendation to help with satiety and lean mass maintenance.

## 2025-05-07 NOTE — ASSESSMENT & PLAN NOTE
S/p RYGB (revision sleeve to RYGB) 6/7/23.    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   Last EGD 11/28/2023 showed no further evidence of stricture, ulcer or acute pathology.      Today she reports ongoing issue with dietary tolerance and frequent nausea that triggers occas vomiting episodes. She reports this happens more at night while working, thinks partially related to stress and or anxiety.     She requests refill of zofran. She states zofran helps if taken before meals at times.   - s/p yousuf.  She still endorses some RUQ pain intermittently.     Taking appropriate supplements most days, some days she says she forgets.   She takes otc PPI.      Plan:  -Continue soft/pureed foods if unable to tolerate advanced diet. Continue to use protein shakes to meet nutritional needs and review high protein snack ideas. Continue to get minimum 64 oz water.   - Continue to track fluids & protein to ensure adequate hydration.  - Reviewed red flag symptoms requiring urgent/emergent evaluation for hydration, nutrition support.  - Complete annual labs, follow up with your dietitian, follow up with this office in 4 weeks.

## 2025-05-07 NOTE — ASSESSMENT & PLAN NOTE
- prn zofran given  - counseled on need to chew thoroughly eat slowly separate liquids and solid  - will follow up with bariatric dietitian.

## 2025-05-07 NOTE — PATIENT INSTRUCTIONS
Schedule Referrals placed today by calling [phone]  If lab work ordered at your visit today please complete prior to next appointment at any  lab facility.     Lab results with be communicated via  Epic MYChart.  If you need assistance setting up your  Taglocityhart: [phone]    Diet Pattern to support weight loss:   Implement a structured meal plan.  Aim for 3 meals per day, focus on the health quality of foods.   Minimize snacking between meals unless experiencing hunger - reflect on why you are snacking prior to eating, this is called mindful eating & can help identify eating for hunger or for other reason (bored, emotional, etc).    Use of self monitoring practices by keeping a food log helps increase your awareness of what you are eating, calorie density of foods, accurate portion sizes, why you are eating, when you are eating, etc.  Use as an analysis tool to better understand behaviors that impact your diet.  Can be used to set goals around dietary changes.     Tracking your total daily calorie intake over the course of 2-4 weeks and comparing that to your weekly weight average can provide real time data on if the average daily calorie intake is supporting weight loss, maintenance or gain over this time frame.     Women will benefit from a calorie target of 8908-9756 per day for weight loss depending on activity level  Men will benefit from a calorie target of 1142-8466 per day for weight loss depending on activity level.     Optimizing protein & dietary fiber can help sustainability of diet changes due to improved satiety (fullness).    Protein goal (Aim for at least 0.7g protein per pound of ideal body weight to help maintain lean body mass in setting of weight loss).    For most women this will be at least approximately 80-90g+ of protein per day depending on activity levels.  For most men this will be approximately 100-120g+ of protein per day depending on activity levels    Dietary Fiber goal  "of 25-30g per day.   Ideally obtained through plentiful additions of vegetables, fruits, 100% whole grains, beans, lentils, etc.  A psyllium husk fiber supplement can be used to increase fiber intake and may benefit increased fullness if taken as 1 scoop of fiber powder in 8-12 oz of water 30 minutes prior to a meal 1-2 times per day.    FOR MEALS:   Use an 8 inch plate to help with appropriate portion size.  1/4 plate protein, ensure at least 4oz portion of protein for 25-30g protein goal per meal --> \"palm of hand size portion\".    1/2 the plate vegetables or fruit --> Daily goal is 5 or MORE servings of fruit and vegetables per DAY  1 serving = 1 cup non starchy vegetables (all veggies except corn, peas, potatoes), 1/2 cup starchy veggies (corn, peas, potatoes), 1/2 cup fruit or 1 small whole piece of fruit (tennis ball size)  Use recommend serving size for whole grain starches - typically 1/3 - 1/2 cup COOKED whole grains such as rice, beans, lentils, quinoa, etc.      FOR SNACKS:   Include protein can help better control hunger. Aim for 10-15g protein with snacks.    Be sure you are using calorie controlled snacks.  Include a fruit or vegetable serving with snacks to increase dietary fiber which helps you stay full longer between meals.      DO NOT DRINK YOUR CALORIES, choose water or zero calorie beverages - aim for 64oz or more of non carbonated, non caffeinated calorie free beverages per day.    EXERCISE GOALS:   Please track your physical activity - Goal is to increase aerobic exercise to at least 150-300 minutes per week (2.5-5 hours).  Strength training is also beneficial for weight loss, maintaining muscle mass and health improvement.    2 sessions per week of resistance training is encouraged to maintain or build muscle mass.  Increase your exercise duration and intensity over time.  Build up slowly to ensure appropriate recovery and avoid injury.      Intensification Beyond Lifestyle " Management  Chronic obesity medications are FDA approved to be used as treatment intensification WITH LIFESTYLE modification.    Weight management medications help to improve appetite control and can help make it easier to maintain a reduced calorie diet to support weight loss.    Please check with your insurance prior to our next appointment to determine if any of the following medications are covered by your insurance plan for the diagnosis of Obesity.  Other medications in similar classes may be covered for Diabetes but you need a diagnosis of Type 2 Diabetes for these medications to be covered.    Contrave - (bupropion/naltrexone) oral (pill) medication taken twice daily, helps better regulate eating behaviors, lessens emotional & cravings triggers for eating  --> Has preferred pricing mail order program approx $99/mo through [facility] if not covered by insurance    Qsymia (Phentermine/Topiramate) - once daily oral capsule (pill) taken in the morning.  May help to reduce cravings and appetite.  This medication is a controlled substance and requires special prescribing and follow up due to this.  Does have preferred pricing mail order program approx $99/month if not covered by insurance through [employer]  program.        Metformin, Phentermine, Topiramate can also be used individually for weight loss off-label. _____________________________________________________________________________________    Counseling / Bariatric Psychology Resources for you to review:  The following behavioral health specialists are recommended to address psychological factors impacting weight management treatment:    [facility] Psychology Resources - [phone]  Dr. Angelika Mims    The following programs provides treatment and assistance for binge  eating disorder and eating disorders:  [facility] - [phone]  [facility]  [address] [address]  P: [phone]  F: [phone]  Toll Free: [phone]    [facility]  [email or web address]    Below please find a list of community psychology providers who may also be able to assist with eating behavior evaluation and treatment:  Buck Hernandes PsyD.    [facility]    Rosetta Rojas - [email or web address].    I recommend that you verify with your insurance if the providers are in network.          The following are the recommendations we discussed at your appointment today:  1. Nutrition  - Please make sure you are seeing the bariatric dietitian regularly.    Dietitian Information:   Michel Nickerson: [phone]  [facility] - [place] [phone]    Your Protein Goals will gradually increase as you get further out from surgery:  0-3 months - 60 GRAMS PER DAY  3-6 months - 60-75 GRAMS PER DAY  6-12 months - 75-90 GRAMS PER DAY  12+ months - 80-90 GRAMS PER DAY    - Follow Pouch Rules;.   Stop drinking 30 minutes before your meals, Take 30 minutes to eat your meal   - NO DRINKING DURING MEALS, Wait for 30 minutes after your meal to drink  - Eat 5 servings of fruits and veggies daily.  A serving is 1 small (tennis ball size) piece of whole fruit, 1/2 cup fresh fruit, 1 cup non starchy vegetables  - Eat 3 small meals and 1-2 healthy, protein rich, snacks per day.    EAT PROTEIN FIRST AT MEALS, 2nd non starchy vegetable or fruit serving, consume starches last and aim for whole grains of small portion.  This pattern of eating ensures you are getting full on high quality protein and higher fiber foods with many vitamins and minerals to support adequate nutrition first.      2. Exercise Recommendations:    Regular physical activity is CRITICAL for long term successful weight management.    Strive for a  "minimum weekly exercise goal of at least 200 minutes per week of aerobic exercise (45 minutes at least 5 days per week or 30 minutes daily)    Strength based resistance training is critical for helping to maintain and build lean body mass/muscle mass which is very important for long term health.  Having higher muscle mass is associated with better health outcomes and can help to maintain higher calorie expenditure.      Designing a Strength Program:  Select 3-4 exercises that target different major muscle groups.  - Emphasize the following movements \"pull, push, squat, hip hinge\"  \"Pull\" examples - pull up, bent over row or dumbbell row, pull down with weight bar or resistance band  \"Push\" examples - push up, bench press, chest flys, dips, overhead press, dumbbell bench press  \"Squat\" examples - air squat, chair squat, lunge steps, goblet squat, front squat, etc  \"Hip hinge\" examples - kettle bell swing, good morning, glute bridge, deadlift, suitcase pick ups, hip thrust    Perform two to three sets of eight to 15 repetitions of each exercise.  Try to use a resistance that feels like an \"8 out of 10\" effort, with 10 being the highest effort you can give.    To get stronger, try increasing either the weight, number of repetitions, number of sets or number of exercises every 4-8 weeks as you feel more comfortable with your current program.      3. Fluids  - Drink at least 60 oz of water every day.   - Wait 30 minutes before or after meals to drink fluids.  - Avoid carbonated beverages.    4. Vitamins  - Remember to take your multivitamins 2 times daily, once in the morning and once in the evening  - Take your calcium 2-3 times daily, at least 2 hours apart from the multivitamin - total daily dose at least 1200-1500mg per day.    - Vitamin D3 3000 units per day  - B12 - depending on your blood work and how well you absorb B12 from your multivitamin you may need additional B12 of 350-500mcg oral per day.    5. Labs  - " We will check labs today and results will be communicated via [facility] My Chart  - A copy of the results can be viewed on  My Chart.      Follow-up with Dietitian for bariatric diet optimization  Follow-up with PCP for general health questions and ongoing management of routine health concerns

## 2025-05-07 NOTE — ASSESSMENT & PLAN NOTE
Annual RYGB micronutrient lab orders placed- please complete labs.   Adjust micronutrient supplementation per results  Continue recommended post bariatric surgery supplements

## 2025-06-04 ENCOUNTER — APPOINTMENT (OUTPATIENT)
Dept: SURGERY | Facility: CLINIC | Age: 30
End: 2025-06-04
Payer: COMMERCIAL

## 2025-06-04 DIAGNOSIS — Z71.3 DIETARY COUNSELING: Primary | ICD-10-CM

## 2025-06-04 DIAGNOSIS — K21.9 GASTROESOPHAGEAL REFLUX DISEASE WITHOUT ESOPHAGITIS: ICD-10-CM

## 2025-06-04 DIAGNOSIS — K91.2 POSTOPERATIVE MALABSORPTION (HHS-HCC): ICD-10-CM

## 2025-06-04 DIAGNOSIS — Z98.84 S/P GASTRIC BYPASS: ICD-10-CM

## 2025-06-04 NOTE — PROGRESS NOTES
BARIATRIC SURGERY CLINIC  Comprehensive Weight Management        Name: Cecelia Phillips  MRN: [id number]     Index Surgery  Date of Surgery: 5/8/2024   Surgeon: Dr. Gan          Revision LSG to RYGB 6/7/23           Surgical Procedure: Other: Lap cholecystectomy  5/8/24    Virtual or Telephone Consent    An interactive audio and video telecommunication system which permits real time communications between the patient (at the originating site) and provider (at the distant site) was utilized to provide this telehealth service.   Verbal consent was requested and obtained from Cecelia Phillips on this date, 06/04/25 for a telehealth visit and the patient's location was confirmed at the time of the visit.    HPI    Cecelia Phillips is a 30 y.o. female presenting today for follow up comprehensive obesity management visit. She is s/p most recently, cholecystectomy with small bowel resection and closure of internal peritoneum. Prior to this she had a remote LSG to RYGB conversion 6/7/23. She has ongoing n/v sx which led to removal of gallbladder. No further sx initially but recently has increased stress at work and reports recurrence of feeling with food sticking. She completed annual labs. Today's visit to evaluate progress and symptom management.    Diet:  - Stage: regular food diet  - Achieving protein and fluid goals most days: yes, working to increase fluids  - having nausea with meals, uncertain if related to malabsorption or anxiety. She states she ran out of zofran which had been helping. She is using protein shakes again to supplement.    Exercise:  - walking, increasing exercise as tolerated.     Concerns related to:  Nausea/Vomiting, Reflux: endorses nausea with some vomiting, 2-3x/week   Abdominal Pain:intermittent right side pain, not like gallbladder pain she had prior  Diarrhea/Constipation Denies    Daily Supplements:  Calcium: Calcium Citrate w/ vitamin D (1200 - 1500mg)  Multivitamin & Minerals: 2 per  day  Vitamin B12: 500 mcg/day   Vitamin D3: 3000 units   Iron/Other: iron supplement   PPI: taking    Primary Medical Hx:  Patient Active Problem List   Diagnosis    Acne    Allergies    Anxiety and depression    Goiter    Gastroesophageal reflux disease    Hiatal hernia with gastroesophageal reflux    History of bariatric surgery    IUD contraception    Migraine without aura and responsive to treatment    Moderate episode of recurrent major depressive disorder    PCOS (polycystic ovarian syndrome)    Vitamin D deficiency    Candidiasis of vagina    Constipation, acute    Dehydration    Iron deficiency anemia    Nausea in adult    Post-op pain    Post-operative nausea and vomiting    Postoperative malabsorption (HHS-HCC)    Psychological factors affecting medical condition    S/P gastric bypass    Dysphagia    SDH (subdural hematoma) (Multi)    S/P cholecystectomy    Dietary counseling    Difficulty sleeping      Past Surgical History:   Procedure Laterality Date    ADENOIDECTOMY      BARIATRIC SURGERY      CHOLECYSTECTOMY  05/08/2024    OTHER SURGICAL HISTORY  01/03/2022    Sleeve gastrectomy    TONSILLECTOMY  11/02/2015    Tonsillectomy With Adenoidectomy      Social History     Socioeconomic History    Marital status: Single     Spouse name: Not on file    Number of children: Not on file    Years of education: Not on file    Highest education level: Not on file   Occupational History    Occupation: Nurse   Tobacco Use    Smoking status: Never     Passive exposure: Never    Smokeless tobacco: Never   Vaping Use    Vaping status: Never Used   Substance and Sexual Activity    Alcohol use: Never    Drug use: Never    Sexual activity: Not Currently     Partners: Male     Birth control/protection: I.U.D.   Other Topics Concern    Not on file   Social History Narrative    Not on file     Social Drivers of Health     Financial Resource Strain: Low Risk  (5/8/2024)    Overall Financial Resource Strain (CARDIA)      Difficulty of Paying Living Expenses: Not hard at all   Food Insecurity: Not on file   Transportation Needs: No Transportation Needs (5/9/2024)    PRAPARE - Transportation     Lack of Transportation (Medical): No     Lack of Transportation (Non-Medical): No   Physical Activity: Not on file   Stress: Not on file   Social Connections: Not on file   Intimate Partner Violence: Not on file   Housing Stability: Low Risk  (5/9/2024)    Housing Stability Vital Sign     Unable to Pay for Housing in the Last Year: No     Number of Places Lived in the Last Year: 1     Unstable Housing in the Last Year: No     Family History   Problem Relation Name Age of Onset    Other (Diabetes mellitus) Mother shelby     Diabetes Mother shelby     No Known Problems Father      Prostate cancer Father's Brother      Hypertension Maternal Grandmother emmarine     Esophageal cancer Paternal Grandfather      Cancer Maternal Grandfather Mandie       Current Outpatient Medications on File Prior to Visit   Medication Sig Dispense Refill    calcium carbonate-vitamin D3 1,000 mg-20 mcg (800 unit) tablet Take by mouth.      ergocalciferol (Vitamin D-2) 1.25 MG (35397 UT) capsule Take 1 capsule twice weekly x 8 weeks then start Vitamin D3 2000 units daily over the counter      ferrous gluconate 236 mg (27 mg iron) tablet Take by mouth.      hydrOXYzine HCL (Atarax) 50 mg tablet Take 1 tablet (50 mg) by mouth once daily at bedtime. 30 tablet 0    levonorgestrel (Mirena) 21 mcg/24 hours (8 yrs) 52 mg IUD by intrauterine route.      multivitamin-min-iron-FA-vit K (Adults Multivitamin) 18 mg iron-400 mcg-25 mcg tablet Take by mouth.      omeprazole (PriLOSEC) 40 mg DR capsule Take 1 capsule (40 mg) by mouth once daily in the morning. Take before meals. Please remove capsule and use granules and mix with sugar free pudding or jello 30 capsule 0    ondansetron ODT (Zofran-ODT) 4 mg disintegrating tablet Dissolve 1-2 tablets (4-8 mg) in the mouth every 8  "hours if needed for nausea or vomiting. 30 tablet 3    promethazine (Phenergan) 12.5 mg tablet Take 1 tablet (12.5 mg) by mouth every 6 hours if needed for nausea.      Vitamin B-1 100 mg tablet Take 0.5 tablets (50 mg) by mouth once daily.       No current facility-administered medications on file prior to visit.      No Known Allergies    REVIEW OF SYSTEMS:  Negative unless otherwise reviewed in HPI.    PHYSICAL EXAM   Physical Exam   Ht: 5'6\"  Wt: 225 lbs ***  BMI: 36.32 ***  Alert, well appearing, no acute distress, nourished, hydrated.  Anicteric sclera, no ptosis  Facial symmetry  Neck supple  Unlabored respirations.  Easily conversant without increased respiratory effort  Oriented to person, place, time.  Judgement intact.  Appropriate mood, affect.     ASSESSMENT & PLAN ***  30 y.o. female presenting for comprehensive obesity management follow up. Lost to care prior to six month pov s/p revision LSG to RYGB, gallbladder removal. At her last visit she had some ongoing issues with nausea and feeling like food was getting stuck causing her to vomit. We discussed given this recurrence potential stricture. She also endorses high stress and anxiety with work, most times if she vomits its during her work shift. Counseled on need to eat slowly, chew thoroughly. Discussed resources for disordered eating behaviors. She needs follow up with bariatric dietitian. Will hold off on imaging at this time per her request. She will complete annual micronutrient labs. Refill of zofran given. She is achieving intake goal for fluids, just issue with solid food/meats lately. She's returned to more soft food diet. Discussed protein target per meal. She continues to use PPI as needed. BM are regular. Follow up with this office in 2-4 weeks, sooner if needed. Reviewed red flag symptoms requiring urgent/emergent evaluation for hydration, nutrition support.     Weight Impression:  - Initial Weight: 250 lbs  - Geovanni Weight 218 lbs   - " Today's Weight: 225 lbs ***  - %Total Weight Loss: -10 % ***    Problem List Items Addressed This Visit    None  Please see Patient Instructions for today's relevant referrals, orders and instructions. Fifteen minutes spent counseling on diet exercise and lifestyle modifications face to face.      Follow Up: No follow-ups on file.     Dana Borrego, APRN-CNP

## 2025-06-04 NOTE — ASSESSMENT & PLAN NOTE
- Reviewed importance of intensification of lifestyle therapy in weight reduction and weight reduction maintenance.   - reviewed/set behavioral modification goals of nutrition and physical activity  - Discussed ways to incorporate increased protein through the day.  - Counseled on benefits of increased regular exercise, both aerobic & resistance training.  Reviewed benefits of resistance training to enhance/maintain lean body mass.  - Counseled on dietary modifications to support weight reduction, reduced calorie diet, encourage adequate protein, increased dietary fiber from fruit and vegetable to improve appetite/satiety regulation and quality of diet.  Discussed impact of processed foods and liquid calories to weight gain & contribution to dysfunctional appetite signals.    - Discussed self monitoring practices to ensure reduce calorie diet, emphasizing increased dietary protein & fiber.  Reviewed protein targets per meal as recommendation to help with satiety and lean mass maintenance.

## 2025-06-04 NOTE — PATIENT INSTRUCTIONS
"The following are the recommendations we discussed at your appointment today:  1. Nutrition  - Please make sure you are seeing the bariatric dietitian regularly.    Dietitian Information:   Michel Nickerson: [phone]  [facility] - [place] [phone]    Your Protein Goals will gradually increase as you get further out from surgery:  0-3 months - 60 GRAMS PER DAY  3-6 months - 60-75 GRAMS PER DAY  6-12 months - 75-90 GRAMS PER DAY  12+ months - 80-90 GRAMS PER DAY    - Follow Pouch Rules;.   Stop drinking 30 minutes before your meals, Take 30 minutes to eat your meal   - NO DRINKING DURING MEALS, Wait for 30 minutes after your meal to drink  - Eat 5 servings of fruits and veggies daily.  A serving is 1 small (tennis ball size) piece of whole fruit, 1/2 cup fresh fruit, 1 cup non starchy vegetables  - Eat 3 small meals and 1-2 healthy, protein rich, snacks per day.    EAT PROTEIN FIRST AT MEALS, 2nd non starchy vegetable or fruit serving, consume starches last and aim for whole grains of small portion.  This pattern of eating ensures you are getting full on high quality protein and higher fiber foods with many vitamins and minerals to support adequate nutrition first.      2. Exercise Recommendations:    Regular physical activity is CRITICAL for long term successful weight management.    Strive for a minimum weekly exercise goal of at least 200 minutes per week of aerobic exercise (45 minutes at least 5 days per week or 30 minutes daily)    Strength based resistance training is critical for helping to maintain and build lean body mass/muscle mass which is very important for long term health.  Having higher muscle mass is associated with better health outcomes and can help to maintain higher calorie expenditure.      Designing a Strength Program:  Select 3-4 exercises that target different major muscle groups.  - Emphasize the following movements \"pull, push, squat, hip hinge\"  \"Pull\" examples - pull up, " "bent over row or dumbbell row, pull down with weight bar or resistance band  \"Push\" examples - push up, bench press, chest flys, dips, overhead press, dumbbell bench press  \"Squat\" examples - air squat, chair squat, lunge steps, goblet squat, front squat, etc  \"Hip hinge\" examples - kettle bell swing, good morning, glute bridge, deadlift, suitcase pick ups, hip thrust    Perform two to three sets of eight to 15 repetitions of each exercise.  Try to use a resistance that feels like an \"8 out of 10\" effort, with 10 being the highest effort you can give.    To get stronger, try increasing either the weight, number of repetitions, number of sets or number of exercises every 4-8 weeks as you feel more comfortable with your current program.      3. Fluids  - Drink at least 60 oz of water every day.   - Wait 30 minutes before or after meals to drink fluids.  - Avoid carbonated beverages.    4. Vitamins  - Remember to take your multivitamins 2 times daily, once in the morning and once in the evening  - Take your calcium 2-3 times daily, at least 2 hours apart from the multivitamin - total daily dose at least 1200-1500mg per day.    - Vitamin D3 3000 units per day  - B12 - depending on your blood work and how well you absorb B12 from your multivitamin you may need additional B12 of 350-500mcg oral per day.    5. Labs  - We will check labs today and results will be communicated via [facility] My Chart  - A copy of the results can be viewed on  My Chart.      Follow-up with Dietitian for bariatric diet optimization  Follow-up with PCP for general health questions and ongoing management of routine health concerns      "

## (undated) DEVICE — NEEDLE, EPIDURAL 17G X 4 1/2 PERIFIX

## (undated) DEVICE — APPLICATOR, ARISTA, FLEXITIP, XL, LF

## (undated) DEVICE — GRASPER, FENESTRATED TIP-UP XI

## (undated) DEVICE — NEEDLE, CLOSURE, OMNICLOSE

## (undated) DEVICE — SYRINGE, 30 CC, LUER LOCK

## (undated) DEVICE — RELOAD, ENDOSTITCH, SURGIDAC, 2-0, 48 IN, GREEN, SULU

## (undated) DEVICE — STAPLER, ECHELON 3000, 60MM LONG

## (undated) DEVICE — SCISSORS, MONOPOLAR, CURVED, 8MM

## (undated) DEVICE — OBTURATOR, BLADELESS , SU

## (undated) DEVICE — HEMOSTAT, ARISTA, ABSORBABLE, 3 GRAMS

## (undated) DEVICE — DEVICE, SUTURE, ENDOSTITCH, 10 MM

## (undated) DEVICE — CLIP, LIGATING, HEM-O-LOCK, MEDIUM/LARGE, LF, GREEN

## (undated) DEVICE — CARE KIT, LAPAROSCOPIC, ADVANCED

## (undated) DEVICE — APPLIER, CLIP MED-LG XI

## (undated) DEVICE — Device

## (undated) DEVICE — DRIVER, NEEDLE, MEGA SUTURE CUT, DAVINCI XI

## (undated) DEVICE — APPLICATOR, CHLORAPREP, W/ORANGE TINT, 26ML

## (undated) DEVICE — RETRIEVAL SYSTEM, MONARCH, 10MM DISP ENDOSCOPIC

## (undated) DEVICE — DRAPE, ARM XI

## (undated) DEVICE — CONNECTOR, IV, ONE-LINK, NEEDLE-FREE W/NEUTRAL FLUID DISPLACEMENT, LF

## (undated) DEVICE — CANNULA REDUCER, DAVINCI XI

## (undated) DEVICE — SCISSOR TIP, ENDOCUT, LAPAROSCOPIC

## (undated) DEVICE — HOLSTER, ELECTROSURGERY ACCESSORY, STERILE

## (undated) DEVICE — TROCAR, KII OPTICAL BLADELESS 5MM Z THREAD 100MM LNGTH

## (undated) DEVICE — SLEEVE, VASO PRESS, CALF GARMENT, MEDIUM, GREEN

## (undated) DEVICE — DRAPE, COLUMN, DAVINCI XI

## (undated) DEVICE — SYRINGE, 20 CC, LUER SLIP

## (undated) DEVICE — TUBING SET, BIFURCATED, SMOKE EVAC, FILTERED, F/AIRSEAL

## (undated) DEVICE — SEAL, UNIVERSAL 5-8MM  XI

## (undated) DEVICE — COVER, TIP HOT SHEARS ENDOWRIST

## (undated) DEVICE — STAPLER,  LINEAR RELOAD, 60MM, WHITE, DISP

## (undated) DEVICE — CANNULA SEAL, STAPLER, DAVINCI XI